# Patient Record
Sex: FEMALE | Race: OTHER | HISPANIC OR LATINO | Employment: UNEMPLOYED | URBAN - METROPOLITAN AREA
[De-identification: names, ages, dates, MRNs, and addresses within clinical notes are randomized per-mention and may not be internally consistent; named-entity substitution may affect disease eponyms.]

---

## 2017-01-31 ENCOUNTER — HOSPITAL ENCOUNTER (EMERGENCY)
Facility: HOSPITAL | Age: 62
Discharge: HOME | End: 2017-01-31
Attending: EMERGENCY MEDICINE
Payer: OTHER GOVERNMENT

## 2017-01-31 VITALS
RESPIRATION RATE: 20 BRPM | HEIGHT: 62 IN | HEART RATE: 69 BPM | SYSTOLIC BLOOD PRESSURE: 167 MMHG | TEMPERATURE: 99 F | BODY MASS INDEX: 38.64 KG/M2 | OXYGEN SATURATION: 100 % | WEIGHT: 210 LBS | DIASTOLIC BLOOD PRESSURE: 81 MMHG

## 2017-01-31 DIAGNOSIS — T30.0 BURN: Primary | ICD-10-CM

## 2017-01-31 DIAGNOSIS — T22.211A BURN OF FOREARM, RIGHT, SECOND DEGREE, INITIAL ENCOUNTER: ICD-10-CM

## 2017-01-31 PROCEDURE — 90471 IMMUNIZATION ADMIN: CPT | Performed by: EMERGENCY MEDICINE

## 2017-01-31 PROCEDURE — 99283 EMERGENCY DEPT VISIT LOW MDM: CPT

## 2017-01-31 PROCEDURE — 63600175 PHARM REV CODE 636 W HCPCS: Performed by: EMERGENCY MEDICINE

## 2017-01-31 PROCEDURE — 90715 TDAP VACCINE 7 YRS/> IM: CPT | Performed by: EMERGENCY MEDICINE

## 2017-01-31 PROCEDURE — 25000003 PHARM REV CODE 250: Performed by: EMERGENCY MEDICINE

## 2017-01-31 RX ORDER — SILVER SULFADIAZINE 10 G/1000G
1 CREAM TOPICAL
Status: COMPLETED | OUTPATIENT
Start: 2017-01-31 | End: 2017-01-31

## 2017-01-31 RX ORDER — SILVER SULFADIAZINE 10 G/1000G
CREAM TOPICAL 2 TIMES DAILY
Qty: 30 G | Refills: 0 | Status: SHIPPED | OUTPATIENT
Start: 2017-01-31 | End: 2017-08-20

## 2017-01-31 RX ORDER — BENAZEPRIL HYDROCHLORIDE 10 MG/1
10 TABLET ORAL DAILY
COMMUNITY

## 2017-01-31 RX ADMIN — SILVER SULFADIAZINE 1 TUBE: 10 CREAM TOPICAL at 12:01

## 2017-01-31 RX ADMIN — CLOSTRIDIUM TETANI TOXOID ANTIGEN (FORMALDEHYDE INACTIVATED), CORYNEBACTERIUM DIPHTHERIAE TOXOID ANTIGEN (FORMALDEHYDE INACTIVATED), BORDETELLA PERTUSSIS TOXOID ANTIGEN (GLUTARALDEHYDE INACTIVATED), BORDETELLA PERTUSSIS FILAMENTOUS HEMAGGLUTININ ANTIGEN (FORMALDEHYDE INACTIVATED), BORDETELLA PERTUSSIS PERTACTIN ANTIGEN, AND BORDETELLA PERTUSSIS FIMBRIAE 2/3 ANTIGEN 0.5 ML: 5; 2; 2.5; 5; 3; 5 INJECTION, SUSPENSION INTRAMUSCULAR at 12:01

## 2017-01-31 NOTE — ED AVS SNAPSHOT
[facility]  [address] [address]  [place] [address]               Yaneth Fabian   2017 12:04 PM   ED    Description:  Female : 1955   Department:  [facility]           Your Care was Coordinated By:     Provider Role From To    Chang Hurtado MD Attending Provider 17 1202 --      Reason for Visit     Burn           Diagnoses this Visit        Comments    Burn    -  Primary     Burn of forearm, right, second degree, initial encounter           ED Disposition     ED Disposition Condition Comment    Discharge             To Do List           Follow-up Information     Follow up with Vahe Hawkins MD In 2 days.    Specialty:  Internal Medicine    Contact information:    [address] [place] [address]  [address]  [facility]  [place] [address]  [phone]         These Medications        Disp Refills Start End    silver sulfADIAZINE 1% (SILVADENE) 1 % cream 30 g 0 2017     Apply topically 2 (two) times daily. - Topical (Top)    Pharmacy: [facility]/pharmacy #[id number] - [place], [place] - [address] [facility] Ph #: [phone]         [facility] On Call     [facility] Nurse Care Line -  Assistance  Registered nurses in the [facility] provide clinical advisement, health education, appointment booking, and other advisory services.  Call for this free service at [phone].             Medications           Message regarding Medications     Verify the changes and/or additions to your medication regime listed below are the same as discussed with your clinician today.  If any of these changes or additions are incorrect, please notify your healthcare provider.        START taking these NEW medications        Refills    silver sulfADIAZINE 1% (SILVADENE) 1 % cream 0    Sig: Apply topically 2 (two) times daily.    Class: Print    Route: Topical (Top)      These medications were administered today        Dose Freq    Tdap vaccine  "injection 0.5 mL 0.5 mL Once    Sig: Inject 0.5 mLs into the muscle once.    Class: Normal    Route: Intramuscular    silver sulfADIAZINE 1% cream 1 Tube 1 Tube ED 1 Time    Sig: Apply 1 Tube topically ED 1 Time.    Class: Normal    Route: Topical (Top)      STOP taking these medications     chlorpheniramine-hydrocodone (TUSSIONEX) 8-10 mg/5 mL suspension Take 5 mLs by mouth every 12 (twelve) hours as needed for Cough.    fluticasone (FLOVENT HFA) 110 mcg/actuation inhaler Inhale 1 puff into the lungs 2 (two) times daily.           Verify that the below list of medications is an accurate representation of the medications you are currently taking.  If none reported, the list may be blank. If incorrect, please contact your healthcare provider. Carry this list with you in case of emergency.           Current Medications     benazepril (LOTENSIN) 10 MG tablet Take 10 mg by mouth once daily.    furosemide (LASIX) 20 MG tablet Take 1 tablet (20 mg total) by mouth once daily.    hydrocodone-acetaminophen 10-325mg (NORCO)  mg Tab Take 1 tablet by mouth every 6 (six) hours as needed.    montelukast (SINGULAIR) 10 mg tablet Take 10 mg by mouth every evening.    nabumetone (RELAFEN) 500 MG tablet Take 500 mg by mouth once daily.    pantoprazole (PROTONIX) 40 MG tablet     potassium chloride (KLOR-CON) 10 MEQ TbSR Take 1 tablet (10 mEq total) by mouth once daily.    zolpidem (AMBIEN) 5 MG Tab Take 1 tablet (5 mg total) by mouth nightly as needed.    silver sulfADIAZINE 1% (SILVADENE) 1 % cream Apply topically 2 (two) times daily.    silver sulfADIAZINE 1% cream 1 Tube Apply 1 Tube topically ED 1 Time.    Tdap vaccine injection 0.5 mL Inject 0.5 mLs into the muscle once.           Clinical Reference Information           Your Vitals Were     BP Pulse Temp Resp Height Weight    196/83 73 98.5 °F (36.9 °C) (Oral) 20 5' 2" (1.575 m) 95.3 kg (210 lb)    SpO2 BMI             98% 38.41 kg/m2         Allergies as of 1/31/2017     " No Known Allergies      Immunizations Administered on Date of Encounter - 1/31/2017     Name Date Dose VIS Date Route    TDAP  Incomplete 0.5 mL 2/24/2015 Intramuscular      ED Micro, Lab, POCT     None      ED Imaging Orders     None        Discharge Instructions         Second-Degree Burn  A burn occurs when skin is exposed to too much heat, sun, or harsh chemicals. A second-degree burn (partial-thickness burn) is deeper than a first-degree burn (superficial burn). It usually causes a blister to form. The blister may remain intact and gradually go away on its own. Or it may break open. The goal of treatment is to relieve pain and stop infection while the burn heals.   Home care  Use pain medicine as directed. If no pain medicine was prescribed, you may use acetaminophen or ibuprofen to control pain. If you have chronic liver or kidney disease, talk with your health care provider before using these medicine. Also talk with your provider if you've had a stomach ulcer or GI bleeding.  General care  · On the first day, you may put a cool compress on the wound to ease pain. A cool compress is a small towel soaked in cool water.  · If you were sent home with the blister intact, don't break the blister. The risk for infection is greater if the blister breaks. If a bandage was applied, change it once a day, unless told otherwise. If the bandage becomes wet or soiled, change it as soon as you can.  · Sometimes an infection may occur even with proper treatment. Check the burn daily for the signs of infection listed below.  · Eat more calories and protein until your wound is healed.  · Wear a hat, sunscreen, and long sleeves while in the sun to protect the skin.  · Don't pick or scratch at the wound. Use over-the-counter medicines like diphenhydramine for itching.  · Avoid tight-fitting clothes.  To change a bandage:  · Wash your hands.  · Take off the old bandage. If the bandage sticks, soak it off under warm running  water.  · Once the bandage is off, gently wash the burn area with mild soap and warm water to remove any cream, ointment, ooze, or scab. You may do this in a sink, under a tub faucet, or in the shower. Rinse off the soap and gently pat dry with a clean towel.  · Check for signs of infection listed below.  · Put any prescribed antibiotic cream or ointment on the wound.  · Cover the burn with nonstick gauze. Then wrap it with the bandage material.  Follow-up care  Follow up with your health care provider, or as advised.  When to seek medical advice  Call your health care provider right away if you have any of these signs of infection:  · Fever over 100.4°F (38°C)  · Pain that gets worse  · Redness or swelling that gets worse  · Pus comes from the burn  · Red streaks in your skin coming from the burn  · Wound doesn't appear to be healing  · Nausea or vomiting   © 3570-5684 [employer]. [address] [place] [address]. All rights reserved. This information is not intended as a substitute for professional medical care. Always follow your healthcare professional's instructions.          Your Scheduled Appointments     Feb 14, 2017 10:00 AM CST   Consult with Lea Sanchez MD   [facility] ([address] [address] [phone]              [facility] Sign-Up     Activating your [facility] account is as easy as 1-2-3!     1) Visit [email or web address], select Sign Up Now, enter this activation code and your date of birth, then select Next.  [email or web address]  Expires: 3/17/2017 12:14 PM      2) Create a username and password to use when you visit [facility] in the future and select a security question in case you lose your password and select Next.    3) Enter your e-mail address and click Sign Up!    Additional Information  If you have questions, please e-mail [email or web address] or call [phone] to talk to our [facility] staff. Remember, [facility] is NOT to be  used for urgent needs. For medical emergencies, dial 911.          [facility] complies with applicable Federal civil rights laws and does not discriminate on the basis of race, color, national origin, age, disability, or sex.        Language Assistance Services     ATTENTION: Language assistance services are available, free of charge. Please call [phone].      ATENCIÓN: Si habla español, tiene a serna disposición servicios gratuitos de asistencia lingüística. Llame al [phone].     CHÚ Ý: N?u b?n nói Ti?ng Vi?t, có các d?ch v? h? tr? ngôn ng? mi?n phí dành cho b?n. G?i s? [phone].

## 2017-01-31 NOTE — ED PROVIDER NOTES
Encounter Date: 1/31/2017       History     Chief Complaint   Patient presents with    Burn     Burn from hot grease to right inner wrist.      Review of patient's allergies indicates:  No Known Allergies  Patient is a 61 y.o. female presenting with the following complaint: burn. The history is provided by the patient.   Burn   The patient complains of a hot grease burn. The incident occurred several days ago. The incident occurred at home. She came to the ER via by private vehicle. Pertinent negatives include no chest pain, no numbness, no abdominal pain, no nausea, no vomiting, no headaches, no neck pain, no light-headedness, no loss of consciousness, no seizures, no weakness and no cough. There have been no prior injuries to these areas. Her tetanus status is out of date.     Past Medical History   Diagnosis Date    Asthma     Hyperlipidemia     Hypertension      No past medical history pertinent negatives.  Past Surgical History   Procedure Laterality Date    Appendectomy      Hernia repair      Bladder repair      Knee surgery       right    Tympanic membrane repair       Family History   Problem Relation Age of Onset    Heart disease Mother     Hypertension Mother     Diabetes Mother     No Known Problems Father      unknown who he was per pt     Social History   Substance Use Topics    Smoking status: Never Smoker    Smokeless tobacco: Not on file    Alcohol use No     Review of Systems   Constitutional: Negative for chills, diaphoresis and fever.   HENT: Negative.  Negative for congestion, drooling, rhinorrhea and sore throat.    Eyes: Negative.  Negative for discharge and itching.   Respiratory: Negative.  Negative for cough, shortness of breath and wheezing.    Cardiovascular: Negative for chest pain, palpitations and leg swelling.   Gastrointestinal: Negative for abdominal pain, constipation, diarrhea, nausea and vomiting.   Genitourinary: Negative for difficulty urinating and dysuria.    Musculoskeletal: Negative for arthralgias, back pain, gait problem, neck pain and neck stiffness.   Skin: Negative for color change and pallor.   Neurological: Negative for dizziness, seizures, loss of consciousness, weakness, light-headedness, numbness and headaches.   Psychiatric/Behavioral: Negative for confusion and decreased concentration.   All other systems reviewed and are negative.      Physical Exam   Initial Vitals   BP Pulse Resp Temp SpO2   01/31/17 1201 01/31/17 1201 01/31/17 1201 01/31/17 1201 01/31/17 1201   196/83 73 20 98.5 °F (36.9 °C) 98 %     Physical Exam    Constitutional: She appears well-developed and well-nourished. No distress.   HENT:   Head: Normocephalic and atraumatic.   Eyes: Conjunctivae are normal. Pupils are equal, round, and reactive to light.   Neck: Normal range of motion. Neck supple.   Cardiovascular: Normal rate, regular rhythm, normal heart sounds and intact distal pulses.   Pulmonary/Chest: Breath sounds normal.   Abdominal: Soft. Bowel sounds are normal. She exhibits no distension. There is no tenderness. There is no rebound.   Musculoskeletal: Normal range of motion. She exhibits no edema.   Neurological: She is alert and oriented to person, place, and time. She has normal strength.   Skin: Skin is warm and dry.        Psychiatric: She has a normal mood and affect.         ED Course   Procedures  Labs Reviewed - No data to display                            ED Course     Clinical Impression:       ICD-10-CM ICD-9-CM   1. Burn T30.0 949.0   2. Burn of forearm, right, second degree, initial encounter T22.211A 943.21         Disposition:   Disposition: Discharged  Condition: Stable       Chang Hurtado MD  01/31/17 1217

## 2017-01-31 NOTE — DISCHARGE INSTRUCTIONS
Second-Degree Burn  A burn occurs when skin is exposed to too much heat, sun, or harsh chemicals. A second-degree burn (partial-thickness burn) is deeper than a first-degree burn (superficial burn). It usually causes a blister to form. The blister may remain intact and gradually go away on its own. Or it may break open. The goal of treatment is to relieve pain and stop infection while the burn heals.   Home care  Use pain medicine as directed. If no pain medicine was prescribed, you may use acetaminophen or ibuprofen to control pain. If you have chronic liver or kidney disease, talk with your health care provider before using these medicine. Also talk with your provider if you've had a stomach ulcer or GI bleeding.  General care  · On the first day, you may put a cool compress on the wound to ease pain. A cool compress is a small towel soaked in cool water.  · If you were sent home with the blister intact, don't break the blister. The risk for infection is greater if the blister breaks. If a bandage was applied, change it once a day, unless told otherwise. If the bandage becomes wet or soiled, change it as soon as you can.  · Sometimes an infection may occur even with proper treatment. Check the burn daily for the signs of infection listed below.  · Eat more calories and protein until your wound is healed.  · Wear a hat, sunscreen, and long sleeves while in the sun to protect the skin.  · Don't pick or scratch at the wound. Use over-the-counter medicines like diphenhydramine for itching.  · Avoid tight-fitting clothes.  To change a bandage:  · Wash your hands.  · Take off the old bandage. If the bandage sticks, soak it off under warm running water.  · Once the bandage is off, gently wash the burn area with mild soap and warm water to remove any cream, ointment, ooze, or scab. You may do this in a sink, under a tub faucet, or in the shower. Rinse off the soap and gently pat dry with a clean towel.  · Check for  signs of infection listed below.  · Put any prescribed antibiotic cream or ointment on the wound.  · Cover the burn with nonstick gauze. Then wrap it with the bandage material.  Follow-up care  Follow up with your health care provider, or as advised.  When to seek medical advice  Call your health care provider right away if you have any of these signs of infection:  · Fever over 100.4°F (38°C)  · Pain that gets worse  · Redness or swelling that gets worse  · Pus comes from the burn  · Red streaks in your skin coming from the burn  · Wound doesn't appear to be healing  · Nausea or vomiting   © 4353-7810 [employer]. [address] [place] [address], [place] [address]. All rights reserved. This information is not intended as a substitute for professional medical care. Always follow your healthcare professional's instructions.

## 2017-02-20 ENCOUNTER — LAB VISIT (OUTPATIENT)
Dept: LAB | Facility: HOSPITAL | Age: 62
End: 2017-02-20
Attending: OBSTETRICS & GYNECOLOGY
Payer: OTHER GOVERNMENT

## 2017-02-20 ENCOUNTER — OFFICE VISIT (OUTPATIENT)
Dept: OBSTETRICS AND GYNECOLOGY | Facility: CLINIC | Age: 62
End: 2017-02-20
Payer: OTHER GOVERNMENT

## 2017-02-20 VITALS
DIASTOLIC BLOOD PRESSURE: 78 MMHG | BODY MASS INDEX: 38.21 KG/M2 | WEIGHT: 215.63 LBS | SYSTOLIC BLOOD PRESSURE: 142 MMHG | HEIGHT: 63 IN

## 2017-02-20 DIAGNOSIS — N95.0 POST-MENOPAUSAL BLEEDING: ICD-10-CM

## 2017-02-20 DIAGNOSIS — E66.9 OBESITY (BMI 35.0-39.9 WITHOUT COMORBIDITY): ICD-10-CM

## 2017-02-20 LAB
BASOPHILS # BLD AUTO: 0.03 K/UL
BASOPHILS NFR BLD: 0.4 %
DIFFERENTIAL METHOD: ABNORMAL
EOSINOPHIL # BLD AUTO: 0.2 K/UL
EOSINOPHIL NFR BLD: 2.4 %
ERYTHROCYTE [DISTWIDTH] IN BLOOD BY AUTOMATED COUNT: 12.7 %
HCT VFR BLD AUTO: 42.9 %
HGB BLD-MCNC: 14.5 G/DL
LYMPHOCYTES # BLD AUTO: 2.4 K/UL
LYMPHOCYTES NFR BLD: 31.5 %
MCH RBC QN AUTO: 31.5 PG
MCHC RBC AUTO-ENTMCNC: 33.8 %
MCV RBC AUTO: 93 FL
MONOCYTES # BLD AUTO: 0.6 K/UL
MONOCYTES NFR BLD: 7.5 %
NEUTROPHILS # BLD AUTO: 4.4 K/UL
NEUTROPHILS NFR BLD: 58.1 %
PLATELET # BLD AUTO: 191 K/UL
PMV BLD AUTO: 12 FL
RBC # BLD AUTO: 4.6 M/UL
WBC # BLD AUTO: 7.58 K/UL

## 2017-02-20 PROCEDURE — 85025 COMPLETE CBC W/AUTO DIFF WBC: CPT

## 2017-02-20 PROCEDURE — 88305 TISSUE EXAM BY PATHOLOGIST: CPT | Mod: 26,,, | Performed by: PATHOLOGY

## 2017-02-20 PROCEDURE — 58100 BIOPSY OF UTERUS LINING: CPT | Mod: S$GLB,,, | Performed by: OBSTETRICS & GYNECOLOGY

## 2017-02-20 PROCEDURE — 3077F SYST BP >= 140 MM HG: CPT | Mod: S$GLB,,, | Performed by: OBSTETRICS & GYNECOLOGY

## 2017-02-20 PROCEDURE — 99203 OFFICE O/P NEW LOW 30 MIN: CPT | Mod: 25,S$GLB,, | Performed by: OBSTETRICS & GYNECOLOGY

## 2017-02-20 PROCEDURE — 88305 TISSUE EXAM BY PATHOLOGIST: CPT | Performed by: PATHOLOGY

## 2017-02-20 PROCEDURE — 36415 COLL VENOUS BLD VENIPUNCTURE: CPT | Mod: PO

## 2017-02-20 PROCEDURE — 88175 CYTOPATH C/V AUTO FLUID REDO: CPT

## 2017-02-20 PROCEDURE — 99999 PR PBB SHADOW E&M-EST. PATIENT-LVL III: CPT | Mod: PBBFAC,,, | Performed by: OBSTETRICS & GYNECOLOGY

## 2017-02-20 PROCEDURE — 3078F DIAST BP <80 MM HG: CPT | Mod: S$GLB,,, | Performed by: OBSTETRICS & GYNECOLOGY

## 2017-02-20 RX ORDER — MELOXICAM 15 MG/1
TABLET ORAL
Refills: 3 | COMMUNITY
Start: 2016-11-23 | End: 2023-12-15 | Stop reason: CLARIF

## 2017-02-20 NOTE — LETTER
February 20, 2017      Vahe Hawkins MD  [address] [facility]  [address]  [facility]  [place] [address]           [facility]  [address] [address] [address]  Phone: [phone]          Patient: Yaneth Fabian   MR Number: [id number]   YOB: 1955   Date of Visit: 2/20/2017       Dear Dr. Vahe Hawkins:    Thank you for referring Yaneth Fabian to me for evaluation. Attached you will find relevant portions of my assessment and plan of care.    If you have questions, please do not hesitate to call me. I look forward to following Yaneth Fabian along with you.    Sincerely,    Hugh Buenrostro MD    Enclosure  CC:  No Recipients    If you would like to receive this communication electronically, please contact [email or web address] or [phone] to request more information on [employer] Link access.    For providers and/or their staff who would like to refer a patient to [facility], please contact us through our one-stop-shop provider referral line, [facility], at [phone].    If you feel you have received this communication in error or would no longer like to receive these types of communications, please e-mail [email or web address]

## 2017-02-20 NOTE — MR AVS SNAPSHOT
[facility]  [address] [address] [address]  Phone: [phone]                  Yaneth Fabian   2017 10:45 AM   Office Visit    Description:  Female : 1955   Provider:  Hugh Buenrostro MD   Department:  [facility]           Reason for Visit     Vaginal Bleeding           Diagnoses this Visit        Comments    Post-menopausal bleeding         Obesity (BMI 35.0-39.9 without comorbidity)                To Do List           Future Appointments        Provider Department Dept Phone    2017 12:15 PM [facility] LABORATORY [facility] [phone]    3/20/2017 10:30 AM Hugh Buenrostro MD [facility]--[phone]      Goals (5 Years of Data)     None      [facility]     [facility] Nurse Care Line -  Assistance  Registered nurses in the [facility] provide clinical advisement, health education, appointment booking, and other advisory services.  Call for this free service at [phone].             Medications           Message regarding Medications     Verify the changes and/or additions to your medication regime listed below are the same as discussed with your clinician today.  If any of these changes or additions are incorrect, please notify your healthcare provider.             Verify that the below list of medications is an accurate representation of the medications you are currently taking.  If none reported, the list may be blank. If incorrect, please contact your healthcare provider. Carry this list with you in case of emergency.           Current Medications     ALBUTEROL INHL Inhale into the lungs.    benazepril (LOTENSIN) 10 MG tablet Take 10 mg by mouth once daily.    furosemide (LASIX) 20 MG tablet Take 1 tablet (20 mg total) by mouth once daily.    hydrocodone-acetaminophen 10-325mg (NORCO)  mg Tab Take 1 tablet by mouth every 6 (six) hours as needed.    meloxicam (MOBIC) 15 MG tablet TAKE 1 TABLET DAILY  "BY MOUTH WITH FOOD    montelukast (SINGULAIR) 10 mg tablet Take 10 mg by mouth every evening.    nabumetone (RELAFEN) 500 MG tablet Take 500 mg by mouth once daily.    pantoprazole (PROTONIX) 40 MG tablet     potassium chloride (KLOR-CON) 10 MEQ TbSR Take 1 tablet (10 mEq total) by mouth once daily.    silver sulfADIAZINE 1% (SILVADENE) 1 % cream Apply topically 2 (two) times daily.    zolpidem (AMBIEN) 5 MG Tab Take 1 tablet (5 mg total) by mouth nightly as needed.           Clinical Reference Information           Your Vitals Were     BP Height Weight Last Period BMI    142/78 5' 2.5" (1.588 m) 97.8 kg (215 lb 9.8 oz) 02/04/2017 (Exact Date) 38.81 kg/m2      Blood Pressure          Most Recent Value    BP  (!)  142/78      Allergies as of 2/20/2017     No Known Allergies      Immunizations Administered on Date of Encounter - 2/20/2017     None      Orders Placed During Today's Visit      Normal Orders This Visit    Endometrial biopsy     Liquid-based pap smear, screening     Tissue Specimen To Pathology, Obstetrics/Gynecology     Future Labs/Procedures Expected by Expires    CBC auto differential  2/20/2017 4/21/2018    Endometrial biopsy  As directed 2/20/2018      Language Assistance Services     ATTENTION: Language assistance services are available, free of charge. Please call [phone].      ATENCIÓN: Si gail steiner, tiene a serna disposición servicios gratuitos de asistencia lingüística. Llame al [phone].     CHÚ Ý: N?u b?n nói Ti?ng Vi?t, có các d?ch v? h? tr? ngôn ng? mi?n phí dành cho b?n. G?i s? [phone].         [facility] complies with applicable Federal civil rights laws and does not discriminate on the basis of race, color, national origin, age, disability, or sex.        "

## 2017-02-20 NOTE — PROGRESS NOTES
"Yaneth Fabian is a 61 y.o.  who presents for   Chief Complaint   Patient presents with    Vaginal Bleeding     c/o  bleeding with cramps x 4 months, "just like a regular period."     Patient's last menstrual period was 2017 (exact date).     Went thru menopause at [age]    Pt is sexually active - mut monog - uses BTL for contraception.    No hx of abnormal paps. Last pap was normal on 13.  Last mammogram was normal on 13.  Last colonoscopy was done 13.      Past Medical History   Diagnosis Date    Asthma     GERD (gastroesophageal reflux disease)     Hyperlipidemia     Hypertension     Knee pain        Past Surgical History   Procedure Laterality Date    Appendectomy      Hernia repair      Bladder repair      Knee surgery       right    Tympanic membrane repair      Tubal ligation         Current Outpatient Prescriptions   Medication Sig Dispense Refill    ALBUTEROL INHL Inhale into the lungs.      benazepril (LOTENSIN) 10 MG tablet Take 10 mg by mouth once daily.      furosemide (LASIX) 20 MG tablet Take 1 tablet (20 mg total) by mouth once daily. 30 tablet 3    hydrocodone-acetaminophen 10-325mg (NORCO)  mg Tab Take 1 tablet by mouth every 6 (six) hours as needed. 45 tablet 0    meloxicam (MOBIC) 15 MG tablet TAKE 1 TABLET DAILY BY MOUTH WITH FOOD  3    montelukast (SINGULAIR) 10 mg tablet Take 10 mg by mouth every evening.  6    nabumetone (RELAFEN) 500 MG tablet Take 500 mg by mouth once daily.      pantoprazole (PROTONIX) 40 MG tablet   3    potassium chloride (KLOR-CON) 10 MEQ TbSR Take 1 tablet (10 mEq total) by mouth once daily. 30 tablet 3    silver sulfADIAZINE 1% (SILVADENE) 1 % cream Apply topically 2 (two) times daily. 30 g 0    zolpidem (AMBIEN) 5 MG Tab Take 1 tablet (5 mg total) by mouth nightly as needed. 30 tablet 2     No current facility-administered medications for this visit.           Review of patient's allergies indicates:  No " "Known Allergies    .  Family History   Problem Relation Age of Onset    Heart disease Mother     Hypertension Mother     Diabetes Mother     No Known Problems Father      unknown who he was per pt    Breast cancer Neg Hx     Colon cancer Neg Hx     Ovarian cancer Neg Hx     Thrombosis Neg Hx        Social History   Substance Use Topics    Smoking status: Never Smoker    Smokeless tobacco: None    Alcohol use No       Visit Vitals    BP (!) 142/78    Ht 5' 2.5" (1.588 m)    Wt 97.8 kg (215 lb 9.8 oz)    LMP 02/04/2017 (Exact Date)    BMI 38.81 kg/m2       ROS:  GENERAL: No other acute complaints.   GI: No nausea, vomiting, melena, hematochezia.  : No dysuria, hematuria.      GEN:  Alert and oriented lady in no acute distress.  HEAD and NECK: Normocephalic. Normal thyroid to inspection and palpation  SKIN: No significant hirsutism or acne                ABDOMEN: Overweight, soft and non tender. No mass, hepatomegaly, RUQT or CVAT.   PELVIC:      Vulva: normal genitalia. No suspicious lesions.       Urethra: normal       Vagina: Moist, no unusual discharge, no significant cystocele or rectocele      Cervix: Normal appearance. Free of discharge or lesion. Very scant brown discharge - Pap done        Uterus: Very difficult exam 2ary to obesity, but feels 8+ wks size, non tender. No cervical motion tenderness.           Bladder base is non tender.      Adnexa: No masses, tenderness, or CDS nodularity.      Anus: normal appearing.    After obtaining verbal consent (discussed bleeding, discomfort, and remotely infection or uterine perforation),  the cervix was visualized and prepped with betadine. The uterus was then sounded to 8 cm and an endometrial biopsy was obtained using a Pipelle without complication and the specimen sent to pathology.    POST ENDOMETRIAL BIOPSY COUNSELING:  Manage post biopsy cramping with NSAIDs or Tyleno.  Expect spotting or light bleeding for a few days.  Report bleeding heavier " than a period, fever > 101.0 F, worsening pain or a foul smelling vaginal discharge.        IMPRESSION: obesity, post menopausal bleeding      COUNSELING:  - Discussed pt's weight and the health implications (increased risks of thrombosis, DM, HTN, renal disease, lipid issues) -  recommend Weight Watchers, OLOL, or any other organized program) along with increased activity/exercise (30-60 min 5 times/wk). Suggest a target of 5-10 % wt reduction over 6-12 months as a goal.    PLAN: pelvic u/s, CBC, RTC 2 WKS

## 2017-08-20 ENCOUNTER — HOSPITAL ENCOUNTER (EMERGENCY)
Facility: HOSPITAL | Age: 62
Discharge: HOME | End: 2017-08-20
Payer: OTHER GOVERNMENT

## 2017-08-20 VITALS
DIASTOLIC BLOOD PRESSURE: 74 MMHG | OXYGEN SATURATION: 96 % | TEMPERATURE: 98 F | HEART RATE: 75 BPM | HEIGHT: 62 IN | SYSTOLIC BLOOD PRESSURE: 175 MMHG | RESPIRATION RATE: 18 BRPM | WEIGHT: 201.63 LBS | BODY MASS INDEX: 37.1 KG/M2

## 2017-08-20 DIAGNOSIS — S90.819A ABRASION FOOT/TOE: Primary | ICD-10-CM

## 2017-08-20 DIAGNOSIS — Z92.29 HISTORY OF TETANUS, DIPHTHERIA, AND ACELLULAR PERTUSSIS BOOSTER VACCINATION (TDAP): ICD-10-CM

## 2017-08-20 DIAGNOSIS — T14.8XXA SCRATCH MARK: ICD-10-CM

## 2017-08-20 PROCEDURE — 99283 EMERGENCY DEPT VISIT LOW MDM: CPT

## 2017-08-20 RX ORDER — MUPIROCIN 20 MG/G
OINTMENT TOPICAL 3 TIMES DAILY
Qty: 30 G | Refills: 0 | Status: SHIPPED | OUTPATIENT
Start: 2017-08-20 | End: 2017-08-30

## 2017-08-21 NOTE — ED NOTES
CARISSA Caballero aware of pt's vital signs & states OK for discharge at this time. Pt remains stable, in NAD, RR equal & unlabored. Reports she will take her meds when she leaves here. Pt denies any further needs, questions, concerns or complaints. Will d/c per order.

## 2017-08-21 NOTE — ED PROVIDER NOTES
"  History      Chief Complaint   Patient presents with    Animal Bite     Pt reports she was in her laundry room ~15-20 minutes PTA & a rat "attacked" her. Superficial scratch noted to L posterior ankle. No bleeding. No other obvious injuries.        Review of patient's allergies indicates:  No Known Allergies     HPI   HPI    8/20/2017, 8:03 PM   History obtained from the patient      History of Present Illness: Yaneth Fabian is a 61 y.o. female patient who presents to the Emergency Department for scratch from rat PTA.  She reports she was folding clothes in her shed when a rat passed by her foot and scratched her.  Denies fever, vomiting, diarrhea.  Patient states she had Tetanus shot in January 2017.      Arrival mode: Personal vehicle     PCP: Vahe Hawkins MD       Past Medical History:  Past Medical History:   Diagnosis Date    Arthritis     Asthma     GERD (gastroesophageal reflux disease)     Hyperlipidemia     Hypertension     Knee pain        Past Surgical History:  Past Surgical History:   Procedure Laterality Date    APPENDECTOMY      BLADDER REPAIR      HERNIA REPAIR      KNEE SURGERY      right    TUBAL LIGATION      TYMPANIC MEMBRANE REPAIR           Family History:  Family History   Problem Relation Age of Onset    Heart disease Mother     Hypertension Mother     Diabetes Mother     No Known Problems Father      unknown who he was per pt    Breast cancer Neg Hx     Colon cancer Neg Hx     Ovarian cancer Neg Hx     Thrombosis Neg Hx        Social History:  Social History     Social History Main Topics    Smoking status: Never Smoker    Smokeless tobacco: Never Used    Alcohol use No    Drug use: No    Sexual activity: Yes     Partners: Male      Comment: mut monog       ROS   Review of Systems   Constitutional: Negative for chills and fever.   HENT: Negative for congestion and sore throat.    Respiratory: Negative for cough and wheezing.    Gastrointestinal: Negative " "for diarrhea and vomiting.   Skin: Positive for wound.     Physical Exam      Initial Vitals [08/20/17 1948]   BP Pulse Resp Temp SpO2   (!) 175/74 75 18 98.1 °F (36.7 °C) 96 %      MAP       107.67          Physical Exam  Nursing Notes and Vital Signs Reviewed.  Constitutional: Patient is in no apparent distress. Awake and alert. Well-developed and well-nourished.  Head: Atraumatic. Normocephalic.  Eyes: PERRL. EOM intact. Conjunctivae are not pale. No scleral icterus.  ENT: Mucous membranes are moist. Oropharynx is clear and symmetric.    Neck: Supple. Full ROM. No lymphadenopathy.  Cardiovascular: Regular rate. Regular rhythm. No murmurs, rubs, or gallops. Distal pulses are 2+ and symmetric.  Pulmonary/Chest: No respiratory distress. Clear to auscultation bilaterally. No wheezing, rales, or rhonchi.  Abdominal: Soft and non-distended.  There is no tenderness.  No rebound, guarding, or rigidity. Good bowel sounds.  Genitourinary: No CVA tenderness  Musculoskeletal: Moves all extremities. No obvious deformities. No edema. No calf tenderness.  Skin: Warm and dry.  Less than 1 cm superficial, abrasion of left heel, no bleeding.    Neurological:  Alert, awake, and appropriate.  Normal speech.  No acute focal neurological deficits are appreciated.  Psychiatric: Normal affect. Good eye contact. Appropriate in content.    ED Course    Procedures  ED Vital Signs:  Vitals:    08/20/17 1948   BP: (!) 175/74   Pulse: 75   Resp: 18   Temp: 98.1 °F (36.7 °C)   TempSrc: Oral   SpO2: 96%   Weight: 91.4 kg (201 lb 9.6 oz)   Height: 5' 2" (1.575 m)       Abnormal Lab Results:  Labs Reviewed - No data to display         Imaging Results:  Imaging Results    None                 The Emergency Provider reviewed the vital signs and test results, which are outlined above.    ED Discussion     8:10 PM: Discussed with pt all pertinent ED information and results. Discussed pt dx and plan of tx. Gave pt all f/u and return to the ED " instructions. All questions and concerns were addressed at this time. Pt expresses understanding of information and instructions, and is comfortable with plan to discharge. Pt is stable for discharge.    I discussed with patient and/or family/caretaker that evaluation in the ED does not suggest any emergent or life threatening medical conditions requiring immediate intervention beyond what was provided in the ED, and I believe patient is safe for discharge.  Regardless, an unremarkable evaluation in the ED does not preclude the development or presence of a serious of life threatening condition. As such, patient was instructed to return immediately for any worsening or change in current symptoms.    Pre-hypertension/Hypertension: The pt has been informed that they may have pre-hypertension or hypertension based on a blood pressure reading in the ED. I recommend that the pt call the PCP listed on their discharge instructions or a physician of their choice this week to arrange f/u for further evaluation of possible pre-hypertension or hypertension.       ED Medication(s):  Medications - No data to display    Discharge Medication List as of 8/20/2017  8:13 PM      START taking these medications    Details   mupirocin (BACTROBAN) 2 % ointment Apply topically 3 (three) times daily., Starting Sun 8/20/2017, Until Wed 8/30/2017, Print             Follow-up Information     Vahe Hawkins MD In 3 days.    Specialty:  Internal Medicine  Contact information:  [address] [place] [id number] [phone]                     Medical Decision Making                  Clinical Impression       ICD-10-CM ICD-9-CM   1. Abrasion foot/toe S90.819A 917.0   2. Scratch sascha T14.8 919.0   3. History of tetanus, diphtheria, and acellular pertussis booster vaccination (Tdap) Z92.29 V45.89       Disposition:   Disposition: Discharged  Condition: Stable           Ada Pena PA-C  08/20/17 2600

## 2017-10-23 ENCOUNTER — HOSPITAL ENCOUNTER (EMERGENCY)
Facility: HOSPITAL | Age: 62
Discharge: HOME | End: 2017-10-23
Attending: EMERGENCY MEDICINE
Payer: OTHER GOVERNMENT

## 2017-10-23 VITALS
BODY MASS INDEX: 36.62 KG/M2 | SYSTOLIC BLOOD PRESSURE: 176 MMHG | HEIGHT: 62 IN | HEART RATE: 74 BPM | TEMPERATURE: 99 F | RESPIRATION RATE: 18 BRPM | DIASTOLIC BLOOD PRESSURE: 82 MMHG | WEIGHT: 199 LBS | OXYGEN SATURATION: 99 %

## 2017-10-23 DIAGNOSIS — B96.89 ACUTE BACTERIAL RHINOSINUSITIS: Primary | ICD-10-CM

## 2017-10-23 DIAGNOSIS — I10 ESSENTIAL HYPERTENSION: ICD-10-CM

## 2017-10-23 DIAGNOSIS — J01.90 ACUTE BACTERIAL RHINOSINUSITIS: Primary | ICD-10-CM

## 2017-10-23 DIAGNOSIS — J00 ACUTE NASOPHARYNGITIS: ICD-10-CM

## 2017-10-23 PROCEDURE — 63600175 PHARM REV CODE 636 W HCPCS: Performed by: NURSE PRACTITIONER

## 2017-10-23 PROCEDURE — 25000003 PHARM REV CODE 250: Performed by: NURSE PRACTITIONER

## 2017-10-23 PROCEDURE — 96372 THER/PROPH/DIAG INJ SC/IM: CPT

## 2017-10-23 PROCEDURE — 99283 EMERGENCY DEPT VISIT LOW MDM: CPT | Mod: 25

## 2017-10-23 RX ORDER — PREDNISONE 20 MG/1
TABLET ORAL
Qty: 18 TABLET | Refills: 0 | Status: SHIPPED | OUTPATIENT
Start: 2017-10-23 | End: 2017-10-23

## 2017-10-23 RX ORDER — AMOXICILLIN AND CLAVULANATE POTASSIUM 875; 125 MG/1; MG/1
1 TABLET, FILM COATED ORAL 2 TIMES DAILY
Qty: 20 TABLET | Refills: 0 | Status: SHIPPED | OUTPATIENT
Start: 2017-10-23 | End: 2017-11-02

## 2017-10-23 RX ORDER — PROMETHAZINE HYDROCHLORIDE AND CODEINE PHOSPHATE 6.25; 1 MG/5ML; MG/5ML
5 SOLUTION ORAL EVERY 4 HOURS PRN
Qty: 118 ML | Refills: 0 | Status: SHIPPED | OUTPATIENT
Start: 2017-10-23 | End: 2017-11-02

## 2017-10-23 RX ORDER — PREDNISONE 20 MG/1
TABLET ORAL
Qty: 18 TABLET | Refills: 0 | Status: SHIPPED | OUTPATIENT
Start: 2017-10-23 | End: 2017-12-09

## 2017-10-23 RX ORDER — AMOXICILLIN AND CLAVULANATE POTASSIUM 875; 125 MG/1; MG/1
1 TABLET, FILM COATED ORAL 2 TIMES DAILY
Qty: 20 TABLET | Refills: 0 | Status: SHIPPED | OUTPATIENT
Start: 2017-10-23 | End: 2017-10-23

## 2017-10-23 RX ORDER — AMOXICILLIN AND CLAVULANATE POTASSIUM 875; 125 MG/1; MG/1
1 TABLET, FILM COATED ORAL
Status: COMPLETED | OUTPATIENT
Start: 2017-10-23 | End: 2017-10-23

## 2017-10-23 RX ORDER — BETAMETHASONE SODIUM PHOSPHATE AND BETAMETHASONE ACETATE 3; 3 MG/ML; MG/ML
6 INJECTION, SUSPENSION INTRA-ARTICULAR; INTRALESIONAL; INTRAMUSCULAR; SOFT TISSUE
Status: COMPLETED | OUTPATIENT
Start: 2017-10-23 | End: 2017-10-23

## 2017-10-23 RX ADMIN — AMOXICILLIN AND CLAVULANATE POTASSIUM 1 TABLET: 875; 125 TABLET, FILM COATED ORAL at 09:10

## 2017-10-23 RX ADMIN — BETAMETHASONE SODIUM PHOSPHATE AND BETAMETHASONE ACETATE 6 MG: 3; 3 INJECTION, SUSPENSION INTRA-ARTICULAR; INTRALESIONAL; INTRAMUSCULAR at 09:10

## 2017-10-24 NOTE — DISCHARGE INSTRUCTIONS
Do not begin the oral steroids until Wednesday since you received a steroid injection today. You may begin the antibiotics tomorrow.     Do not drive or operate heavy machinery after taking cough medication.     Complete full course of antibiotics.

## 2017-10-24 NOTE — ED PROVIDER NOTES
"Encounter Date: 10/23/2017       History     Chief Complaint   Patient presents with    URI     Pt states, "I have this real bad cold." Onset 1 week ago. Reports taking OTC cough medicine for past week without relief. Reports cough and runny nose.      The history is provided by the patient.   Sinusitis    This is a new problem. The current episode started several days ago. The problem has been gradually worsening. The pain is at a severity of 8/10. The pain has been worsening since onset. Associated symptoms include chills, sweats, congestion, sinus pressure, sore throat and cough. Pertinent negatives include no shortness of breath. She has tried acetaminophen and other medications for the symptoms. The treatment provided no relief.       PCP:   Vahe Hawkins MD        Review of patient's allergies indicates:  No Known Allergies  Past Medical History:   Diagnosis Date    Arthritis     Asthma     GERD (gastroesophageal reflux disease)     Hyperlipidemia     Hypertension     Knee pain      Past Surgical History:   Procedure Laterality Date    APPENDECTOMY      BLADDER REPAIR      HERNIA REPAIR      KNEE SURGERY      right    TUBAL LIGATION      TYMPANIC MEMBRANE REPAIR       Family History   Problem Relation Age of Onset    Heart disease Mother     Hypertension Mother     Diabetes Mother     No Known Problems Father      unknown who he was per pt    Breast cancer Neg Hx     Colon cancer Neg Hx     Ovarian cancer Neg Hx     Thrombosis Neg Hx      Social History   Substance Use Topics    Smoking status: Never Smoker    Smokeless tobacco: Never Used    Alcohol use No     Review of Systems   Constitutional: Positive for chills. Negative for fever.   HENT: Positive for congestion, postnasal drip, rhinorrhea, sinus pain, sinus pressure and sore throat.    Eyes: Negative for visual disturbance.   Respiratory: Positive for cough and chest tightness. Negative for shortness of breath.  "   Cardiovascular: Negative for chest pain and palpitations.   Gastrointestinal: Negative for abdominal pain and nausea.   Genitourinary: Negative for dysuria.   Musculoskeletal: Negative for back pain, neck pain and neck stiffness.   Skin: Negative for rash.   Neurological: Positive for headaches (frontal). Negative for dizziness and weakness.   Hematological: Does not bruise/bleed easily.       Physical Exam     Initial Vitals [10/23/17 2043]   BP Pulse Resp Temp SpO2   (!) 181/84 91 20 98.5 °F (36.9 °C) 100 %      MAP       116.33         Physical Exam    Nursing note and vitals reviewed.  Constitutional: She appears well-developed and well-nourished. She is Obese . She is cooperative. She appears ill (appears tired and ill). No distress.   HENT:   Head: Normocephalic and atraumatic.   Right Ear: Hearing and external ear normal. A middle ear effusion (clear) is present.   Left Ear: Hearing and external ear normal. There is tenderness. A middle ear effusion (clear) is present.   Nose: Mucosal edema and sinus tenderness present.   Mouth/Throat: Uvula is midline and mucous membranes are normal. Oropharyngeal exudate and posterior oropharyngeal erythema present.   Ceruminosis present bilaterally.   Eyes: Conjunctivae, EOM and lids are normal. Pupils are equal, round, and reactive to light.   Neck: Trachea normal and normal range of motion. Neck supple.   Cardiovascular: Normal rate, regular rhythm, intact distal pulses and normal pulses.   Pulmonary/Chest: Effort normal and breath sounds normal. No respiratory distress. She has no wheezes. She has no rhonchi. She has no rales.   Abdominal: Soft. She exhibits no distension and no mass. There is no tenderness. There is no rebound and no guarding.   Musculoskeletal: Normal range of motion. She exhibits no edema or tenderness.   Lymphadenopathy:     She has cervical adenopathy.        Right cervical: Superficial cervical adenopathy present.        Left cervical:  "Superficial cervical adenopathy present.   Neurological: She is alert and oriented to person, place, and time. She has normal strength. GCS eye subscore is 4. GCS verbal subscore is 5. GCS motor subscore is 6.   Neurovascular intact to all extremities. Normal gait.    Skin: Skin is warm, dry and intact. Capillary refill takes less than 2 seconds. No rash noted.   Psychiatric: She has a normal mood and affect. Her speech is normal and behavior is normal. Thought content normal.         ED Course   Procedures      ED Medications:   Medications   amoxicillin-clavulanate 875-125mg per tablet 1 tablet (1 tablet Oral Given 10/23/17 2123)   betamethasone acetate-betamethasone sodium phosphate injection 6 mg (6 mg Intramuscular Given 10/23/17 2122)       ED Course Vitals  Vitals:    10/23/17 2043 10/23/17 2137   BP: (!) 181/84 (!) 176/82   BP Location: Left arm Right arm   Patient Position: Sitting Sitting   Pulse: 91 74   Resp: 20 18   Temp: 98.5 °F (36.9 °C)    TempSrc: Oral    SpO2: 100% 99%   Weight: 90.3 kg (199 lb)    Height: 5' 2" (1.575 m)          2135 HOURS RE-EVALUATION & DISPOSITION:   Reassessment at the time of disposition demonstrates that the patient is resting comfortably in no acute distress. Ms. Fabian states that she is feeling much better upon discharge.  She has remained hemodynamically stable throughout the entire ED visit and is without objective evidence for acute process requiring urgent intervention or hospitalization. I provided counseling to patient with regard to condition, the treatment plan, specific conditions for return, and the importance of follow up.  Answered questions at this time. The patient is stable for discharge.                 Medical Decision Making:   History:   Old Records Summarized: records from clinic visits.                     Clinical Impression:       ICD-10-CM ICD-9-CM   1. Acute bacterial rhinosinusitis J01.90 461.9    B96.89    2. Acute nasopharyngitis J00 460 "   3. Essential hypertension I10 401.9         Disposition:   Disposition: Discharged  Condition: Stable  I discussed with patient that the evaluation in the emergency department does not suggest any emergent or life threatening medical condition requiring immediate intervention beyond what was provided in the ED, and I believe patient is safe for discharge.  Regardless, an unremarkable evaluation in the ED does not preclude the development or presence of a serious of life threatening condition. As such, patient was instructed to return immediately for any worsening or change in current symptoms. I also discussed the results of my evaluation and diagnosis with patient and she concurs with the evaluation and management plan.  Detailed written and verbal instructions provided to patient and she expressed a verbal understanding of the discharge instructions and overall management plan. Reiterated the importance of medication administration and safety and advised patient to follow up with primary care provider in 3-5 days or sooner if needed.  Also advised patient to return to the ER for any complications.     Regarding SINUSITIS, for treatment, encouraged patient to refrain from smoking, drink plenty of fluids, rest, take medications as prescribed, and to use a humidifier or steam in the bathroom. Patient instructed to notify primary care provider if: they have a cough most days or have a cough that returns frequently; are coughing up blood; have a high fever or shaking chills; have a low-grade fever for three or more days; develop thick, greenish mucus, especially if it has a bad smell; and feel short of breath or have chest pain, or return to emergency department for further evaluation. Also recommended that the patient shower in the morning and evening to wash away any allergens and help reduce the production of mucus, avoid taking decongestants if diagnosed with hypertension.  If decongestants were recommended,  advised patient to not take for longer than 5 days to help prevent rebound congestion. For prevention, discussed with patient the importance of not smoking, getting annual influenza vaccines, reducing exposure to air pollution, and to frequently wash hands to avoid spread of infection. Instructed patient to follow up with primary care provider.    Regarding UPPER RESPIRATORY ILLNESS, for treatment, I encouraged patient to: drink plenty of fluids; get lots of rest; take medications as prescribed; use over-the-counter medications for symptomatic treatment;  and use a humidifier or steam in the bathroom to improve patency of upper airway.  Patient instructed to notify primary care provider, or return to the emergency department, if the they: have a cough most days or have a cough that returns frequently; begin coughing up blood; develop a high fever or shaking chills; have a low-grade fever for three or more days; develop thick, greenish mucus, especially if it has a bad smell; and experience shortness of breath or chest pain. For prevention, discussed with patient the importance of refraining from smoking (if applicable), getting annual influenza vaccines, reducing exposure to air pollution, and the need to frequently wash hands to avoid spread of infection.     Regarding HYPERTENSION, I advised patient to: keep a record of blood pressure results; take your blood pressure medication exactly as directed without skipping doses; avoid medications that contain heart stimulants, including over-the-counter drugs such as decongestants; maintain a healthy weight; cut back on sodium intake (i.e., limit canned, dried, packaged, and fast foods and dont add salt to food); follow the DASH (Dietary Approaches to Stop Hypertension) eating plan which recommends vegetables, fruits, whole gains, and other heart healthy foods; begin an exercise program that includes  aerobic exercise 3 to 4 times a week for an average of 40 minutes at  a time (with approval of cardiologist or primary care provider); limit drinks that contain alcohol and caffeine; control levels of emotional stress; and seek emergency care for any shortness of breath, chest pain, difficulty speaking, confusion, or visual changes.               Discharge Medication List as of 10/23/2017  9:23 PM      START taking these medications    Details   promethazine-codeine 6.25-10 mg/5 ml (PHENERGAN WITH CODEINE) 6.25-10 mg/5 mL syrup Take 5 mLs by mouth every 4 (four) hours as needed., Starting Mon 10/23/2017, Until Thu 11/2/2017, Print      amoxicillin-clavulanate 875-125mg (AUGMENTIN) 875-125 mg per tablet Take 1 tablet by mouth 2 (two) times daily., Starting Mon 10/23/2017, Until Thu 11/2/2017, Normal      predniSONE (DELTASONE) 20 MG tablet Take 1 tablet 3 times per day for 3 days, then Take 1 tablet 2 times per day for 3 days, then Take 1 tablet daily for 3 days, Normal             Follow-up Information     Call  Vahe Hawkins MD.    Specialty:  Internal Medicine  Why:  If symptoms worsen or as needed  Contact information:  [address] [address]  [phone]                            Ottoniel King NP  10/25/17 2044

## 2017-12-09 ENCOUNTER — HOSPITAL ENCOUNTER (EMERGENCY)
Facility: HOSPITAL | Age: 62
Discharge: HOME | End: 2017-12-09
Attending: EMERGENCY MEDICINE
Payer: OTHER GOVERNMENT

## 2017-12-09 VITALS
BODY MASS INDEX: 37.13 KG/M2 | SYSTOLIC BLOOD PRESSURE: 191 MMHG | WEIGHT: 203 LBS | OXYGEN SATURATION: 97 % | HEART RATE: 86 BPM | DIASTOLIC BLOOD PRESSURE: 89 MMHG | TEMPERATURE: 98 F | RESPIRATION RATE: 16 BRPM

## 2017-12-09 DIAGNOSIS — J20.8 ACUTE BRONCHITIS DUE TO OTHER SPECIFIED ORGANISMS: Primary | ICD-10-CM

## 2017-12-09 DIAGNOSIS — I10 ESSENTIAL HYPERTENSION: ICD-10-CM

## 2017-12-09 PROCEDURE — 99283 EMERGENCY DEPT VISIT LOW MDM: CPT

## 2017-12-09 RX ORDER — PREDNISONE 20 MG/1
TABLET ORAL
Qty: 18 TABLET | Refills: 0 | Status: SHIPPED | OUTPATIENT
Start: 2017-12-09 | End: 2023-12-15 | Stop reason: CLARIF

## 2017-12-09 RX ORDER — PROMETHAZINE HYDROCHLORIDE AND CODEINE PHOSPHATE 6.25; 1 MG/5ML; MG/5ML
5 SOLUTION ORAL EVERY 4 HOURS PRN
Qty: 160 ML | Refills: 0 | Status: SHIPPED | OUTPATIENT
Start: 2017-12-09 | End: 2017-12-19

## 2017-12-09 RX ORDER — CEFDINIR 300 MG/1
300 CAPSULE ORAL 2 TIMES DAILY
Qty: 20 CAPSULE | Refills: 0 | Status: SHIPPED | OUTPATIENT
Start: 2017-12-09 | End: 2017-12-19

## 2017-12-09 NOTE — ED PROVIDER NOTES
Encounter Date: 12/9/2017       History     Chief Complaint   Patient presents with    Nasal Congestion     x 1 week     The history is provided by the patient.   URI   The primary symptoms include cough and wheezing. Primary symptoms do not include fever, headaches, sore throat, swollen glands, abdominal pain, nausea, vomiting, myalgias or rash. The current episode started several days ago (for approximately one week). This is a recurrent problem. The problem has been gradually worsening.   The cough began more than 1 week ago. The cough is productive. The sputum is yellow and green.   Wheezing began more than 2 days ago. Wheezing occurs intermittently. Progression: waxing and waning. The patient's medical history is significant for asthma.   Symptoms associated with the illness include congestion and rhinorrhea. The illness is not associated with chills or sinus pressure. The following treatments were addressed: Acetaminophen was ineffective. NSAIDs were ineffective.         PCP:   Vahe Hawkins MD        Review of patient's allergies indicates:  No Known Allergies  Past Medical History:   Diagnosis Date    Arthritis     Asthma     GERD (gastroesophageal reflux disease)     Hyperlipidemia     Hypertension     Insomnia 6/13/2013    Knee pain     Obesity (BMI 35.0-39.9 without comorbidity) 2/20/2017     Past Surgical History:   Procedure Laterality Date    APPENDECTOMY      BLADDER REPAIR      HERNIA REPAIR      KNEE SURGERY      right    TUBAL LIGATION      TYMPANIC MEMBRANE REPAIR       Family History   Problem Relation Age of Onset    Heart disease Mother     Hypertension Mother     Diabetes Mother     No Known Problems Father      unknown who he was per pt    Breast cancer Neg Hx     Colon cancer Neg Hx     Ovarian cancer Neg Hx     Thrombosis Neg Hx      Social History   Substance Use Topics    Smoking status: Never Smoker    Smokeless tobacco: Never Used    Alcohol use No      Review of Systems   Constitutional: Negative for chills and fever.   HENT: Positive for congestion, postnasal drip and rhinorrhea. Negative for sinus pain, sinus pressure and sore throat.    Eyes: Negative for pain, discharge and visual disturbance.   Respiratory: Positive for cough, chest tightness and wheezing. Negative for shortness of breath and stridor.    Cardiovascular: Negative for chest pain and palpitations.   Gastrointestinal: Negative for abdominal pain, diarrhea, nausea and vomiting.   Genitourinary: Negative for dysuria.   Musculoskeletal: Negative for back pain, myalgias and neck pain.   Skin: Negative for rash.   Neurological: Negative for weakness and headaches.   Hematological: Does not bruise/bleed easily.   Psychiatric/Behavioral: Negative for confusion.       Physical Exam     Initial Vitals [12/09/17 1738]   BP Pulse Resp Temp SpO2   (!) 191/89 86 16 98 °F (36.7 °C) 97 %      MAP       123         Physical Exam    Nursing note and vitals reviewed.  Constitutional: Vital signs are normal. She appears well-developed and well-nourished. She is Obese . She is cooperative. She does not appear ill. No distress.   HENT:   Head: Normocephalic and atraumatic.   Right Ear: Hearing, tympanic membrane, external ear and ear canal normal.   Left Ear: Hearing, tympanic membrane, external ear and ear canal normal.   Nose: Mucosal edema present.   Mouth/Throat: Uvula is midline and mucous membranes are normal. Posterior oropharyngeal erythema present.   Eyes: Conjunctivae, EOM and lids are normal. Pupils are equal, round, and reactive to light.   Neck: Trachea normal and normal range of motion. Neck supple.   Cardiovascular: Normal rate, regular rhythm, intact distal pulses and normal pulses.   Pulmonary/Chest: Effort normal. No respiratory distress. She has wheezes (improves with coughing) in the right upper field. She has no rhonchi. She has no rales.   Abdominal: Soft. She exhibits no distension and no  mass. There is no tenderness. There is no rebound and no guarding.   Musculoskeletal: Normal range of motion. She exhibits no edema or tenderness.   Lymphadenopathy:     She has no cervical adenopathy.   Neurological: She is alert and oriented to person, place, and time. She has normal strength. Gait normal. GCS eye subscore is 4. GCS verbal subscore is 5. GCS motor subscore is 6.   Skin: Skin is warm, dry and intact. Capillary refill takes less than 2 seconds. No rash noted.   Psychiatric: She has a normal mood and affect. Her speech is normal and behavior is normal. Thought content normal.         ED Course   Procedures            Medical Decision Making:   History:   Old Records Summarized: records from clinic visits.                     Clinical Impression:       ICD-10-CM ICD-9-CM   1. Acute bronchitis due to other specified organisms J20.8 466.0   2. Essential hypertension I10 401.9         Disposition:   Disposition: Discharged  Condition: Stable  I discussed with patient that the evaluation in the emergency department does not suggest any emergent or life threatening medical condition requiring immediate intervention beyond what was provided in the ED, and I believe patient is safe for discharge.  Regardless, an unremarkable evaluation in the ED does not preclude the development or presence of a serious of life threatening condition. As such, patient was instructed to return immediately for any worsening or change in current symptoms. I also discussed the results of my evaluation and diagnosis with patient and she concurs with the evaluation and management plan.  Detailed written and verbal instructions provided to patient and she expressed a verbal understanding of the discharge instructions and overall management plan. Reiterated the importance of medication administration and safety and advised patient to follow up with primary care provider in 3-5 days or sooner if needed.  Also advised patient to return  to the ER for any complications.     Regarding BRONCHITIS, for treatment, I encouraged patient to refrain from smoking, drink plenty of fluids, rest, take medications as prescribed, and to use a humidifier or steam in the bathroom. Patient instructed to notify primary care provider if: they have a cough most days or have a cough that returns frequently; are coughing up blood; have a high fever or shaking chills; have a low-grade fever for three or more days; develop thick, greenish mucus, especially if it has a bad smell; and feel short of breath or have chest pain. For prevention, discussed with patient the importance of not smoking, getting annual influenza vaccines, reducing exposure to air pollution, and to frequently wash hands to avoid spread of infection.  Instructed patient to return to emergency department if they experience chest pain or shortness of breath and to follow up with primary care provider for re-evaluation.    Regarding HYPERTENSION, I advised patient to: keep a record of blood pressure results; take your blood pressure medication exactly as directed without skipping doses; avoid medications that contain heart stimulants, including over-the-counter drugs such as decongestants; maintain a healthy weight; cut back on sodium intake (i.e., limit canned, dried, packaged, and fast foods and dont add salt to food); follow the DASH (Dietary Approaches to Stop Hypertension) eating plan which recommends vegetables, fruits, whole gains, and other heart healthy foods; begin an exercise program that includes  aerobic exercise 3 to 4 times a week for an average of 40 minutes at a time (with approval of cardiologist or primary care provider); limit drinks that contain alcohol and caffeine; control levels of emotional stress; and seek emergency care for any shortness of breath, chest pain, difficulty speaking, confusion, or visual changes.               New Prescriptions    CEFDINIR (OMNICEF) 300 MG CAPSULE     Take 1 capsule (300 mg total) by mouth 2 (two) times daily.    PREDNISONE (DELTASONE) 20 MG TABLET    Take 1 tablet 3 times per day for 3 days, then Take 1 tablet 2 times per day for 3 days, then Take 1 tablet daily for 3 days    PROMETHAZINE-CODEINE 6.25-10 MG/5 ML (PHENERGAN WITH CODEINE) 6.25-10 MG/5 ML SYRUP    Take 5 mLs by mouth every 4 (four) hours as needed for Cough.       Follow-up Information     Schedule an appointment as soon as possible for a visit  with Vahe Hawkins MD.    Specialty:  Internal Medicine  Contact information:  [address] [place] [id number]  [phone]                              Ottoniel King NP  12/09/17 1530

## 2017-12-10 NOTE — DISCHARGE INSTRUCTIONS
Do not drive or operate heavy machinery after taking cough medication as it contains a narcotic.    Follow up with primary care provider.     Complete full course of antibiotics.  Drink plenty of fluid and get lots of rest.    Your antibiotics and oral steroids have been sent electronically to [facility]/pharmacy in [place].

## 2018-03-12 ENCOUNTER — HOSPITAL ENCOUNTER (OUTPATIENT)
Dept: RADIOLOGY | Facility: HOSPITAL | Age: 63
Discharge: HOME | End: 2018-03-12
Attending: ORTHOPAEDIC SURGERY
Payer: OTHER GOVERNMENT

## 2018-03-12 ENCOUNTER — HOSPITAL ENCOUNTER (OUTPATIENT)
Dept: CARDIOLOGY | Facility: CLINIC | Age: 63
Discharge: HOME | End: 2018-03-12
Payer: OTHER GOVERNMENT

## 2018-03-12 DIAGNOSIS — Z01.818 PREOP EXAMINATION: Primary | ICD-10-CM

## 2018-03-12 DIAGNOSIS — Z01.818 PREOP EXAMINATION: ICD-10-CM

## 2018-03-12 DIAGNOSIS — M17.12 DEGENERATIVE ARTHRITIS OF LEFT KNEE: Primary | ICD-10-CM

## 2018-03-12 DIAGNOSIS — I10 HTN (HYPERTENSION): ICD-10-CM

## 2018-03-12 DIAGNOSIS — M17.12 DEGENERATIVE ARTHRITIS OF LEFT KNEE: ICD-10-CM

## 2018-03-12 DIAGNOSIS — I10 ESSENTIAL HYPERTENSION, MALIGNANT: ICD-10-CM

## 2018-03-12 DIAGNOSIS — I10 ESSENTIAL HYPERTENSION, MALIGNANT: Primary | ICD-10-CM

## 2018-03-12 PROCEDURE — 93010 ELECTROCARDIOGRAM REPORT: CPT | Mod: S$GLB,,, | Performed by: NUCLEAR MEDICINE

## 2018-03-12 PROCEDURE — 71046 X-RAY EXAM CHEST 2 VIEWS: CPT | Mod: TC,PO

## 2018-03-12 PROCEDURE — 71046 X-RAY EXAM CHEST 2 VIEWS: CPT | Mod: 26,,, | Performed by: RADIOLOGY

## 2018-04-02 ENCOUNTER — HOSPITAL ENCOUNTER (EMERGENCY)
Facility: HOSPITAL | Age: 63
Discharge: HOME | End: 2018-04-02
Attending: INTERNAL MEDICINE
Payer: OTHER GOVERNMENT

## 2018-04-02 VITALS
HEIGHT: 62 IN | OXYGEN SATURATION: 98 % | TEMPERATURE: 99 F | HEART RATE: 81 BPM | DIASTOLIC BLOOD PRESSURE: 70 MMHG | WEIGHT: 206 LBS | SYSTOLIC BLOOD PRESSURE: 119 MMHG | RESPIRATION RATE: 12 BRPM | BODY MASS INDEX: 37.91 KG/M2

## 2018-04-02 DIAGNOSIS — I82.409 DVT (DEEP VENOUS THROMBOSIS): ICD-10-CM

## 2018-04-02 DIAGNOSIS — M77.9 INFLAMMATION AROUND JOINT: ICD-10-CM

## 2018-04-02 DIAGNOSIS — R60.9 SWELLING: ICD-10-CM

## 2018-04-02 LAB
ALBUMIN SERPL BCP-MCNC: 3.3 G/DL
ALP SERPL-CCNC: 94 U/L
ALT SERPL W/O P-5'-P-CCNC: 20 U/L
ANION GAP SERPL CALC-SCNC: 8 MMOL/L
APTT BLDCRRT: 25.8 SEC
AST SERPL-CCNC: 21 U/L
BASOPHILS # BLD AUTO: 0.03 K/UL
BASOPHILS NFR BLD: 0.5 %
BILIRUB SERPL-MCNC: 0.4 MG/DL
BUN SERPL-MCNC: 11 MG/DL
CALCIUM SERPL-MCNC: 9 MG/DL
CHLORIDE SERPL-SCNC: 106 MMOL/L
CO2 SERPL-SCNC: 25 MMOL/L
CREAT SERPL-MCNC: 0.7 MG/DL
DIFFERENTIAL METHOD: ABNORMAL
EOSINOPHIL # BLD AUTO: 0.2 K/UL
EOSINOPHIL NFR BLD: 3.2 %
ERYTHROCYTE [DISTWIDTH] IN BLOOD BY AUTOMATED COUNT: 12.9 %
EST. GFR  (AFRICAN AMERICAN): >60 ML/MIN/1.73 M^2
EST. GFR  (NON AFRICAN AMERICAN): >60 ML/MIN/1.73 M^2
GLUCOSE SERPL-MCNC: 107 MG/DL
HCT VFR BLD AUTO: 34 %
HGB BLD-MCNC: 10.9 G/DL
INR PPP: 1
LYMPHOCYTES # BLD AUTO: 1.6 K/UL
LYMPHOCYTES NFR BLD: 27.1 %
MCH RBC QN AUTO: 30.6 PG
MCHC RBC AUTO-ENTMCNC: 32.1 G/DL
MCV RBC AUTO: 96 FL
MONOCYTES # BLD AUTO: 0.6 K/UL
MONOCYTES NFR BLD: 9.6 %
NEUTROPHILS # BLD AUTO: 3.5 K/UL
NEUTROPHILS NFR BLD: 59.4 %
PLATELET # BLD AUTO: 250 K/UL
PMV BLD AUTO: 10.3 FL
POTASSIUM SERPL-SCNC: 4 MMOL/L
PROT SERPL-MCNC: 6.9 G/DL
PROTHROMBIN TIME: 10 SEC
RBC # BLD AUTO: 3.56 M/UL
SODIUM SERPL-SCNC: 139 MMOL/L
WBC # BLD AUTO: 5.95 K/UL

## 2018-04-02 PROCEDURE — 85730 THROMBOPLASTIN TIME PARTIAL: CPT

## 2018-04-02 PROCEDURE — 85025 COMPLETE CBC W/AUTO DIFF WBC: CPT

## 2018-04-02 PROCEDURE — 80053 COMPREHEN METABOLIC PANEL: CPT

## 2018-04-02 PROCEDURE — 85610 PROTHROMBIN TIME: CPT

## 2018-04-02 PROCEDURE — 99284 EMERGENCY DEPT VISIT MOD MDM: CPT | Mod: 25

## 2018-04-02 NOTE — ED NOTES
Attempted to consult Dr Veronica spoke with Jo Ann ([phone]) states that she will try to get the message to MD due tohim being at [facility]. States that if unable to reach office she will involve her direct supervisor

## 2018-04-02 NOTE — ED NOTES
Advised patient of Ortho office follow up Friday 4/6/18, at 1030. Wrote this information on patient discharge information. Patient verbalized understanding.

## 2018-04-02 NOTE — ED PROVIDER NOTES
Encounter Date: 4/2/2018       History     Chief Complaint   Patient presents with    Ankle Pain     knee surgery on 3/19 pt noticed bruising to left ankle, states painful,report HH nurse told her to come get checked      Presents complaining of swelling with associated ecchymosis over the ankle and foot, left site. States knee replacement surgery on 3/19/18 after which noticed some ecchymosis on the thigh but during the weekend started to noticed the bruise also on her ankle and foot. Some tenderness to palpation over foot area. No recent injury, taking aspirin 81 mg daily.      The history is provided by the patient.     Review of patient's allergies indicates:  No Known Allergies  Past Medical History:   Diagnosis Date    Arthritis     Asthma     GERD (gastroesophageal reflux disease)     Hyperlipidemia     Hypertension     Insomnia 6/13/2013    Knee pain     Obesity (BMI 35.0-39.9 without comorbidity) 2/20/2017     Past Surgical History:   Procedure Laterality Date    APPENDECTOMY      BLADDER REPAIR      HERNIA REPAIR      JOINT REPLACEMENT      KNEE SURGERY      right    TUBAL LIGATION      TYMPANIC MEMBRANE REPAIR       Family History   Problem Relation Age of Onset    Heart disease Mother     Hypertension Mother     Diabetes Mother     No Known Problems Father      unknown who he was per pt    Breast cancer Neg Hx     Colon cancer Neg Hx     Ovarian cancer Neg Hx     Thrombosis Neg Hx      Social History   Substance Use Topics    Smoking status: Never Smoker    Smokeless tobacco: Never Used    Alcohol use No     Review of Systems   Constitutional: Negative for appetite change, chills and fever.   HENT: Negative for dental problem, ear pain and sore throat.    Eyes: Negative for visual disturbance.   Respiratory: Negative for cough and shortness of breath.    Cardiovascular: Negative for chest pain and palpitations.   Gastrointestinal: Negative for abdominal pain, blood in stool,  diarrhea and vomiting.   Genitourinary: Negative for dysuria, pelvic pain and vaginal discharge.   Musculoskeletal: Positive for joint swelling. Negative for back pain and myalgias.   Skin: Positive for color change. Negative for rash.   Neurological: Negative for weakness and headaches.   Psychiatric/Behavioral: Negative for sleep disturbance.   All other systems reviewed and are negative.      Physical Exam     Initial Vitals [04/02/18 1325]   BP Pulse Resp Temp SpO2   125/60 92 16 98.2 °F (36.8 °C) 98 %      MAP       81.67         Physical Exam    Nursing note and vitals reviewed.  Constitutional: She appears well-developed and well-nourished. No distress.   HENT:   Head: Normocephalic and atraumatic.   Mouth/Throat: Oropharynx is clear and moist.   Eyes: Conjunctivae are normal. Pupils are equal, round, and reactive to light.   Neck: Normal range of motion.   Cardiovascular: Normal rate, regular rhythm, normal heart sounds and intact distal pulses.   Pulmonary/Chest: Breath sounds normal.   Abdominal: Soft. There is no tenderness.   Musculoskeletal: Normal range of motion. She exhibits edema.   Bruising of different staging among bilateral distal femur, bilateral malleolus area and lateral dorsum of the foot on left side; closed surgical wound noticed, Full AROM, No point tenderness, N-V intact; Pitting edema +2 noticed      Neurological: She is alert and oriented to person, place, and time. She has normal strength.   Skin: Skin is warm and dry. Capillary refill takes less than 2 seconds. No rash noted.   Bruising of different staging among bilateral distal femur, bilateral malleolus area and lateral dorsum of the foot on left side; closed surgical wound noticed, Full AROM, No point tenderness, N-V intact; Pitting edema +2 noticed         Psychiatric: Her behavior is normal.         ED Course   Procedures  Labs Reviewed   CBC W/ AUTO DIFFERENTIAL - Abnormal; Notable for the following:        Result Value    RBC  3.56 (*)     Hemoglobin 10.9 (*)     Hematocrit 34.0 (*)     All other components within normal limits   COMPREHENSIVE METABOLIC PANEL - Abnormal; Notable for the following:     Albumin 3.3 (*)     All other components within normal limits   PROTIME-INR   APTT   COMPREHENSIVE METABOLIC PANEL         Results for orders placed or performed during the hospital encounter of 04/02/18   CBC auto differential   Result Value Ref Range    WBC 5.95 3.90 - 12.70 K/uL    RBC 3.56 (L) 4.00 - 5.40 M/uL    Hemoglobin 10.9 (L) 12.0 - 16.0 g/dL    Hematocrit 34.0 (L) 37.0 - 48.5 %    MCV 96 82 - 98 fL    MCH 30.6 27.0 - 31.0 pg    MCHC 32.1 32.0 - 36.0 g/dL    RDW 12.9 11.5 - 14.5 %    Platelets 250 150 - 350 K/uL    MPV 10.3 9.2 - 12.9 fL    Gran # (ANC) 3.5 1.8 - 7.7 K/uL    Lymph # 1.6 1.0 - 4.8 K/uL    Mono # 0.6 0.3 - 1.0 K/uL    Eos # 0.2 0.0 - 0.5 K/uL    Baso # 0.03 0.00 - 0.20 K/uL    Gran% 59.4 38.0 - 73.0 %    Lymph% 27.1 18.0 - 48.0 %    Mono% 9.6 4.0 - 15.0 %    Eosinophil% 3.2 0.0 - 8.0 %    Basophil% 0.5 0.0 - 1.9 %    Differential Method Automated    Protime-INR   Result Value Ref Range    Prothrombin Time 10.0 9.0 - 12.5 sec    INR 1.0 0.8 - 1.2   APTT   Result Value Ref Range    aPTT 25.8 21.0 - 32.0 sec   Comprehensive metabolic panel   Result Value Ref Range    Sodium 139 136 - 145 mmol/L    Potassium 4.0 3.5 - 5.1 mmol/L    Chloride 106 95 - 110 mmol/L    CO2 25 23 - 29 mmol/L    Glucose 107 70 - 110 mg/dL    BUN, Bld 11 6 - 20 mg/dL    Creatinine 0.7 0.5 - 1.4 mg/dL    Calcium 9.0 8.7 - 10.5 mg/dL    Total Protein 6.9 6.0 - 8.4 g/dL    Albumin 3.3 (L) 3.5 - 5.2 g/dL    Total Bilirubin 0.4 0.1 - 1.0 mg/dL    Alkaline Phosphatase 94 55 - 135 U/L    AST 21 10 - 40 U/L    ALT 20 10 - 44 U/L    Anion Gap 8 8 - 16 mmol/L    eGFR if African American >60.0 >60 mL/min/1.73 m^2    eGFR if non African American >60.0 >60 mL/min/1.73 m^2     Imaging Results          X-Ray Chest PA And Lateral (Final result)  Result time  04/02/18 16:21:13    Final result by Melvin Brown III, MD (04/02/18 16:21:13)                 Impression:         Negative two-view chest x-ray.      Electronically signed by: MELVIN BROWN MD  Date:     04/02/18  Time:    16:21              Narrative:    No Two-view chest x-ray.    Clinical indication: Shortness of breath     Heart size is normal. The lung fields are clear. No acute pulmonary infiltrate.  Mild degenerative changes noted in the thoracic spine.                             US Lower Extremity Veins Left (Final result)     Abnormal  Result time 04/02/18 14:59:14    Final result by Melvin Cerrato MD (04/02/18 14:59:14)                 Impression:       Small segment of the left popliteal vein is noncompressible and slightly expanded consistent with acute DVT.      Electronically signed by: MELVIN CERRATO MD  Date:     04/02/18  Time:    14:59              Narrative:    Indication: Leg swelling, leg pain.    CPT code 57477.    Findings:    Grayscale, color flow, and Doppler evaluation of the left lower extremity deep venous structures was performed.      Small segment of the left popliteal vein is noncompressible and slightly expanded consistent with acute DVT.  Remaining deep venous structures display normal coaptation and response augmentation without evidence of deep venous thrombosis.                             X-Ray Ankle Complete Left (Final result)  Result time 04/02/18 14:00:38    Final result by Melvin Brown III, MD (04/02/18 14:00:38)                 Impression:     As above.  Soft tissue swelling.  No acute fracture or dislocation about the ankle.  Moderate calcaneal spurring.      Electronically signed by: MELVIN BROWN MD  Date:     04/02/18  Time:    14:00              Narrative:    Left ankle, 4 views.    Clinical indication: Left ankle pain.    There is soft tissue swelling about the left ankle.  Moderate calcaneal spurring.  No acute fracture.  No dislocation.                              X-Ray Knee Complete 4 or more Views Left (Final result)  Result time 04/02/18 14:01:18   Procedure changed from X-Ray Knee 3 View Left     Final result by Alexandre Brown III, MD (04/02/18 14:01:18)                 Impression:     No acute abnormality suggested about the left knee      Electronically signed by: ALEXANDRE BROWN MD  Date:     04/02/18  Time:    14:01              Narrative:    Left knee x-ray, 4 views.    Clinical indication: Left knee pain.    Knee prosthesis noted in satisfactory position alignment.  No acute fracture or dislocation.                            Consult: I have discussed case with Dr. Vahe Hawkins from Internal Medicine (Primary care Provider for the patient) who agrees with current management.  Consulted Service: Primary Care Provider (Internal Medicine)  Physician Consulted: Dr. GUS Hawkins  Discharge Recommendations: Agree with Eloquis  Follow Up: Notify Pt to call the office for F/U this week    Consult:  Dr. Rueda from Orthopedic Surgery (Primary Orthopedic surgeon at [facility]) :      Case discuss with Fina at Dr. Rueda Office who agree with plan and will F/U Pt on Friday at 10:30 AM          At this time pt stable, understood her diagnosis and recommended treatment with Eloquis as anticoagulation for the acute DVT. At this time no contraindication for DVT management and no contraindications for outpatient management. Pt understood red flags signs to return to the emergency room for re evaluation or DVT complications requiring inpatient management.     The patient is resting comfortably and is in no acute distress.  She states that her symptoms have improved after treatment within ER. Discussed test results, shared treatment plan, specific conditions for return, and importance of follow up with patient and family. Advised to finish her treatment with Eloquis as well. She understands and agrees with the plan as discussed.  Answered her questions at this time. She has remained hemodynamically stable throughout the ED course and is appropriate for discharge home.                  Clinical Impression:   Diagnoses of Inflammation around joint, Swelling, and DVT (deep venous thrombosis) were pertinent to this visit.    Disposition:   Disposition: Discharged  Condition: Stable                        Sal Bernardo MD  04/02/18 8119       Sal Bernardo MD  04/02/18 3959

## 2018-04-03 ENCOUNTER — PES CALL (OUTPATIENT)
Dept: ADMINISTRATIVE | Facility: CLINIC | Age: 63
End: 2018-04-03

## 2018-10-20 ENCOUNTER — HOSPITAL ENCOUNTER (INPATIENT)
Facility: HOSPITAL | Age: 63
LOS: 3 days | Discharge: HOME | DRG: 355 | End: 2018-10-23
Attending: EMERGENCY MEDICINE | Admitting: INTERNAL MEDICINE
Payer: OTHER GOVERNMENT

## 2018-10-20 DIAGNOSIS — K43.9 SPIGELIAN HERNIA: ICD-10-CM

## 2018-10-20 DIAGNOSIS — K46.0 INCARCERATED HERNIA: ICD-10-CM

## 2018-10-20 DIAGNOSIS — I10 ESSENTIAL HYPERTENSION: ICD-10-CM

## 2018-10-20 DIAGNOSIS — R10.9 ABDOMINAL PAIN: ICD-10-CM

## 2018-10-20 DIAGNOSIS — K43.2 INCISIONAL HERNIA, WITHOUT OBSTRUCTION OR GANGRENE: Primary | ICD-10-CM

## 2018-10-20 LAB
ALBUMIN SERPL BCP-MCNC: 3.8 G/DL
ALP SERPL-CCNC: 89 U/L
ALT SERPL W/O P-5'-P-CCNC: 19 U/L
ANION GAP SERPL CALC-SCNC: 10 MMOL/L
APTT BLDCRRT: 27.3 SEC
AST SERPL-CCNC: 25 U/L
BASOPHILS # BLD AUTO: 0.02 K/UL
BASOPHILS NFR BLD: 0.2 %
BILIRUB SERPL-MCNC: 0.5 MG/DL
BILIRUB UR QL STRIP: NEGATIVE
BUN SERPL-MCNC: 12 MG/DL
CALCIUM SERPL-MCNC: 9.5 MG/DL
CHLORIDE SERPL-SCNC: 105 MMOL/L
CLARITY UR REFRACT.AUTO: CLEAR
CO2 SERPL-SCNC: 25 MMOL/L
COLOR UR AUTO: YELLOW
CREAT SERPL-MCNC: 0.8 MG/DL
DIFFERENTIAL METHOD: NORMAL
EOSINOPHIL # BLD AUTO: 0.1 K/UL
EOSINOPHIL NFR BLD: 0.7 %
ERYTHROCYTE [DISTWIDTH] IN BLOOD BY AUTOMATED COUNT: 13 %
EST. GFR  (AFRICAN AMERICAN): >60 ML/MIN/1.73 M^2
EST. GFR  (NON AFRICAN AMERICAN): >60 ML/MIN/1.73 M^2
GLUCOSE SERPL-MCNC: 130 MG/DL
GLUCOSE UR QL STRIP: NEGATIVE
HCT VFR BLD AUTO: 44.1 %
HGB BLD-MCNC: 14.4 G/DL
HGB UR QL STRIP: NEGATIVE
INR PPP: 1
KETONES UR QL STRIP: NEGATIVE
LEUKOCYTE ESTERASE UR QL STRIP: NEGATIVE
LIPASE SERPL-CCNC: 18 U/L
LYMPHOCYTES # BLD AUTO: 2 K/UL
LYMPHOCYTES NFR BLD: 23 %
MCH RBC QN AUTO: 30.3 PG
MCHC RBC AUTO-ENTMCNC: 32.7 G/DL
MCV RBC AUTO: 93 FL
MONOCYTES # BLD AUTO: 0.7 K/UL
MONOCYTES NFR BLD: 7.5 %
NEUTROPHILS # BLD AUTO: 6.1 K/UL
NEUTROPHILS NFR BLD: 68.5 %
NITRITE UR QL STRIP: NEGATIVE
PH UR STRIP: 6 [PH] (ref 5–8)
PLATELET # BLD AUTO: 190 K/UL
PMV BLD AUTO: 11.4 FL
POTASSIUM SERPL-SCNC: 4 MMOL/L
PROT SERPL-MCNC: 7.6 G/DL
PROT UR QL STRIP: NEGATIVE
PROTHROMBIN TIME: 10.1 SEC
RBC # BLD AUTO: 4.75 M/UL
SODIUM SERPL-SCNC: 140 MMOL/L
SP GR UR STRIP: 1.01 (ref 1–1.03)
TROPONIN I SERPL DL<=0.01 NG/ML-MCNC: <0.006 NG/ML
URN SPEC COLLECT METH UR: NORMAL
UROBILINOGEN UR STRIP-ACNC: NEGATIVE EU/DL
WBC # BLD AUTO: 8.88 K/UL

## 2018-10-20 PROCEDURE — 99900035 HC TECH TIME PER 15 MIN (STAT)

## 2018-10-20 PROCEDURE — 99285 EMERGENCY DEPT VISIT HI MDM: CPT | Mod: 25

## 2018-10-20 PROCEDURE — 25000003 PHARM REV CODE 250: Performed by: EMERGENCY MEDICINE

## 2018-10-20 PROCEDURE — 96376 TX/PRO/DX INJ SAME DRUG ADON: CPT

## 2018-10-20 PROCEDURE — 84484 ASSAY OF TROPONIN QUANT: CPT

## 2018-10-20 PROCEDURE — 93010 ELECTROCARDIOGRAM REPORT: CPT | Mod: ,,, | Performed by: INTERNAL MEDICINE

## 2018-10-20 PROCEDURE — 96375 TX/PRO/DX INJ NEW DRUG ADDON: CPT

## 2018-10-20 PROCEDURE — 96374 THER/PROPH/DIAG INJ IV PUSH: CPT

## 2018-10-20 PROCEDURE — 21400001 HC TELEMETRY ROOM

## 2018-10-20 PROCEDURE — 83690 ASSAY OF LIPASE: CPT

## 2018-10-20 PROCEDURE — 85025 COMPLETE CBC W/AUTO DIFF WBC: CPT

## 2018-10-20 PROCEDURE — 85610 PROTHROMBIN TIME: CPT

## 2018-10-20 PROCEDURE — 85730 THROMBOPLASTIN TIME PARTIAL: CPT

## 2018-10-20 PROCEDURE — 25500020 PHARM REV CODE 255: Performed by: EMERGENCY MEDICINE

## 2018-10-20 PROCEDURE — 80053 COMPREHEN METABOLIC PANEL: CPT

## 2018-10-20 PROCEDURE — 81003 URINALYSIS AUTO W/O SCOPE: CPT

## 2018-10-20 PROCEDURE — 93005 ELECTROCARDIOGRAM TRACING: CPT

## 2018-10-20 PROCEDURE — 63600175 PHARM REV CODE 636 W HCPCS: Performed by: EMERGENCY MEDICINE

## 2018-10-20 PROCEDURE — 11000001 HC ACUTE MED/SURG PRIVATE ROOM

## 2018-10-20 RX ORDER — HYDRALAZINE HYDROCHLORIDE 20 MG/ML
10 INJECTION INTRAMUSCULAR; INTRAVENOUS
Status: COMPLETED | OUTPATIENT
Start: 2018-10-20 | End: 2018-10-20

## 2018-10-20 RX ORDER — ONDANSETRON 2 MG/ML
4 INJECTION INTRAMUSCULAR; INTRAVENOUS
Status: COMPLETED | OUTPATIENT
Start: 2018-10-20 | End: 2018-10-20

## 2018-10-20 RX ORDER — HYDROMORPHONE HYDROCHLORIDE 2 MG/ML
0.25 INJECTION, SOLUTION INTRAMUSCULAR; INTRAVENOUS; SUBCUTANEOUS
Status: COMPLETED | OUTPATIENT
Start: 2018-10-20 | End: 2018-10-20

## 2018-10-20 RX ORDER — SODIUM CHLORIDE 9 MG/ML
1000 INJECTION, SOLUTION INTRAVENOUS
Status: COMPLETED | OUTPATIENT
Start: 2018-10-20 | End: 2018-10-20

## 2018-10-20 RX ORDER — HYDROMORPHONE HYDROCHLORIDE 2 MG/ML
0.5 INJECTION, SOLUTION INTRAMUSCULAR; INTRAVENOUS; SUBCUTANEOUS
Status: COMPLETED | OUTPATIENT
Start: 2018-10-20 | End: 2018-10-20

## 2018-10-20 RX ADMIN — SODIUM CHLORIDE 1000 ML: 0.9 INJECTION, SOLUTION INTRAVENOUS at 10:10

## 2018-10-20 RX ADMIN — HYDROMORPHONE HYDROCHLORIDE 0.25 MG: 2 INJECTION INTRAMUSCULAR; INTRAVENOUS; SUBCUTANEOUS at 08:10

## 2018-10-20 RX ADMIN — ONDANSETRON 4 MG: 2 INJECTION INTRAMUSCULAR; INTRAVENOUS at 06:10

## 2018-10-20 RX ADMIN — HYDROMORPHONE HYDROCHLORIDE 0.5 MG: 2 INJECTION INTRAMUSCULAR; INTRAVENOUS; SUBCUTANEOUS at 06:10

## 2018-10-20 RX ADMIN — IOHEXOL 100 ML: 350 INJECTION, SOLUTION INTRAVENOUS at 09:10

## 2018-10-20 RX ADMIN — HYDRALAZINE HYDROCHLORIDE 10 MG: 20 INJECTION INTRAMUSCULAR; INTRAVENOUS at 07:10

## 2018-10-20 NOTE — ED PROVIDER NOTES
History     Chief Complaint   Patient presents with    Abdominal Pain     off and since last week with N/V/D worst today       Review of patient's allergies indicates:  No Known Allergies    History of Present Illness   HPI    10/20/2018, 6:20 PM  The history is provided by the patient and daughter Marianne Fabian is a 62 y.o. female presenting to the ED for abdominal pain.  Patient reports that she has had intermittent episodes of periumbilical abdominal pain.  However today was the worst.  Pain started approximately 7:00 a.m. this morning when it started.  It is described as a pain. It radiates to the back.  It is rated as severe.  It is worsened when she stands up straight.  Better when she remains in a fetal position.  It is associated with nausea and vomiting. She had 3 episodes of emesis.  No hematemesis.  Three episodes of loose stools.  No melena or hematochezia.  Patient denies any chest pain, chest pressure, shortness of breath, difficulty breathing, cough, dysuria, urgency, frequency.  Prior surgeries include appendectomy and hernia surgery.  Prior treatment includes taking Prilosec, Pepto-Bismol, and aspirin 81 mg.      Arrival mode:  Personal Vehicle    PCP: Vahe Hawkins MD     Allergies:  Review of patient's allergies indicates:  No Known Allergies    Past Medical History:  Past Medical History:   Diagnosis Date    Arthritis     Asthma     DVT (deep venous thrombosis)     left popliteal    GERD (gastroesophageal reflux disease)     Hyperlipidemia     Hypertension     Insomnia 6/13/2013    Knee pain     Obesity (BMI 35.0-39.9 without comorbidity) 2/20/2017       Past Surgical History:  Past Surgical History:   Procedure Laterality Date    APPENDECTOMY      BLADDER REPAIR      HERNIA REPAIR      JOINT REPLACEMENT      KNEE SURGERY      right    TUBAL LIGATION      TYMPANIC MEMBRANE REPAIR           Family History:  Family History   Problem Relation Age of Onset     Heart disease Mother     Hypertension Mother     Diabetes Mother     No Known Problems Father         unknown who he was per pt    Breast cancer Neg Hx     Colon cancer Neg Hx     Ovarian cancer Neg Hx     Thrombosis Neg Hx        Social History:  Social History     Tobacco Use    Smoking status: Never Smoker    Smokeless tobacco: Never Used   Substance and Sexual Activity    Alcohol use: No    Drug use: No    Sexual activity: Yes     Partners: Male     Comment: mut monog        Review of Systems   Review of Systems   Constitutional: Negative for fever.   HENT: Negative for sore throat.    Respiratory: Negative for shortness of breath.    Cardiovascular: Negative for chest pain.   Gastrointestinal: Positive for abdominal pain, diarrhea, nausea and vomiting.   Genitourinary: Negative for decreased urine volume, dysuria, flank pain, frequency and urgency.   Musculoskeletal: Negative for back pain.   Skin: Negative for rash.   Neurological: Negative for weakness.   Hematological: Does not bruise/bleed easily.   Psychiatric/Behavioral: The patient is not nervous/anxious.           Physical Exam     Initial Vitals [10/20/18 1818]   BP Pulse Resp Temp SpO2   (!) 202/96 78 19 99.1 °F (37.3 °C) 99 %      MAP       --          Physical Exam    Nursing Notes and Vital Signs Reviewed.  Constitutional: Patient is in mild distress. Well-developed and well-nourished.  Head: Atraumatic. Normocephalic.  Eyes: PERRL. EOM intact. Conjunctivae are not pale. No scleral icterus.  ENT: Mucous membranes are moist. Oropharynx is clear and symmetric.    Neck: Supple. Full ROM. No lymphadenopathy.  Cardiovascular: Regular rate. Regular rhythm. No murmurs, rubs, or gallops. Distal pulses are 2+ and symmetric.  Pulmonary/Chest: No respiratory distress. Clear to auscultation bilaterally. No wheezing or rales.  Abdominal: Soft and non-distended.  periumbilical tenderness. No rebound, guarding, or rigidity. Good bowel  "sounds.  Genitourinary: No CVA tenderness  Musculoskeletal: Moves all extremities. No obvious deformities. No edema. No calf tenderness.  Skin: Warm and dry.  Neurological:  Alert, awake, and appropriate.  Normal speech.  No acute focal neurological deficits are appreciated.  Psychiatric: Normal affect. Good eye contact. Appropriate in content.     ED Course   Procedures  ED Vital Signs:  Vitals:    10/20/18 1818 10/20/18 1826 10/20/18 1830 10/20/18 1851   BP: (!) 202/96   (!) 229/91   Pulse: 78 76 77 77   Resp: 19      Temp: 99.1 °F (37.3 °C)      TempSrc: Oral      SpO2: 99%   99%   Weight: 93.4 kg (205 lb 14.6 oz)      Height: 5' 2.5" (1.588 m)       10/20/18 1902 10/20/18 1931 10/20/18 2002 10/20/18 2030   BP: (!) 203/86 (!) 203/93 (!) 151/65    Pulse: 74 72 80 82   Resp: 18 (!) 27  (!) 22   Temp:       TempSrc:       SpO2: 99% 98% 97% 98%   Weight:       Height:        10/20/18 2031 10/20/18 2133   BP: (!) 155/70 (!) 152/68   Pulse: 82 81   Resp: (!) 21 (!) 23   Temp:     TempSrc:     SpO2: 99% 97%   Weight:     Height:         Abnormal Lab Results:  Labs Reviewed   COMPREHENSIVE METABOLIC PANEL - Abnormal; Notable for the following components:       Result Value    Glucose 130 (*)     All other components within normal limits   CBC W/ AUTO DIFFERENTIAL   LIPASE   TROPONIN I   URINALYSIS        All Lab Results:  Results for orders placed or performed during the hospital encounter of 10/20/18   CBC auto differential   Result Value Ref Range    WBC 8.88 3.90 - 12.70 K/uL    RBC 4.75 4.00 - 5.40 M/uL    Hemoglobin 14.4 12.0 - 16.0 g/dL    Hematocrit 44.1 37.0 - 48.5 %    MCV 93 82 - 98 fL    MCH 30.3 27.0 - 31.0 pg    MCHC 32.7 32.0 - 36.0 g/dL    RDW 13.0 11.5 - 14.5 %    Platelets 190 150 - 350 K/uL    MPV 11.4 9.2 - 12.9 fL    Gran # (ANC) 6.1 1.8 - 7.7 K/uL    Lymph # 2.0 1.0 - 4.8 K/uL    Mono # 0.7 0.3 - 1.0 K/uL    Eos # 0.1 0.0 - 0.5 K/uL    Baso # 0.02 0.00 - 0.20 K/uL    Gran% 68.5 38.0 - 73.0 %    " Lymph% 23.0 18.0 - 48.0 %    Mono% 7.5 4.0 - 15.0 %    Eosinophil% 0.7 0.0 - 8.0 %    Basophil% 0.2 0.0 - 1.9 %    Differential Method Automated    Comprehensive metabolic panel   Result Value Ref Range    Sodium 140 136 - 145 mmol/L    Potassium 4.0 3.5 - 5.1 mmol/L    Chloride 105 95 - 110 mmol/L    CO2 25 23 - 29 mmol/L    Glucose 130 (H) 70 - 110 mg/dL    BUN, Bld 12 6 - 20 mg/dL    Creatinine 0.8 0.5 - 1.4 mg/dL    Calcium 9.5 8.7 - 10.5 mg/dL    Total Protein 7.6 6.0 - 8.4 g/dL    Albumin 3.8 3.5 - 5.2 g/dL    Total Bilirubin 0.5 0.1 - 1.0 mg/dL    Alkaline Phosphatase 89 55 - 135 U/L    AST 25 10 - 40 U/L    ALT 19 10 - 44 U/L    Anion Gap 10 8 - 16 mmol/L    eGFR if African American >60.0 >60 mL/min/1.73 m^2    eGFR if non African American >60.0 >60 mL/min/1.73 m^2   Lipase   Result Value Ref Range    Lipase 18 4 - 60 U/L   Troponin I   Result Value Ref Range    Troponin I <0.006 0.000 - 0.026 ng/mL   Urinalysis   Result Value Ref Range    Specimen UA Urine, Clean Catch     Color, UA Yellow Yellow, Straw, [place]    Appearance, UA Clear Clear    pH, UA 6.0 5.0 - 8.0    Specific Gravity, UA 1.015 1.005 - 1.030    Protein, UA Negative Negative    Glucose, UA Negative Negative    Ketones, UA Negative Negative    Bilirubin (UA) Negative Negative    Occult Blood UA Negative Negative    Nitrite, UA Negative Negative    Urobilinogen, UA Negative <2.0 EU/dL    Leukocytes, UA Negative Negative              EKG Readings: (Independently Interpreted)   EKG:  Rate is 76 beats per minute. Sinus rhythm.  Left axis deviation.  There is a pattern of right bundle-branch block.  There is inverted T-wave in 1 and aVL.  This is new in comparison to the EKG dated March 12, 2018.  There is no ST segment elevation noted in today's EKG        Imaging Results:  Imaging Results          CT Abdomen Pelvis With Contrast (Final result)  Result time 10/20/18 21:36:04    Final result by Daniel Robbins Jr., MD (10/20/18 21:36:04)                  Impression:      1. Right sided spigelian hernia containing a loop of ascending colon.  There is inflammation about the herniated bowel loop.  Please correlate clinically for incarcerated hernia.  No definite bowel obstruction related to this process.  2. Sigmoid colon diverticulosis.  3. Prior cholecystectomy.      Electronically signed by: Daniel Robbins Jr., MD  Date:    10/20/2018  Time:    21:36             Narrative:    EXAMINATION:  CT ABDOMEN PELVIS WITH CONTRAST    CLINICAL HISTORY:  abdominal pain;    TECHNIQUE:  Low dose axial images, sagittal and coronal reformations were obtained from the lung bases to the pubic symphysis following the IV administration of 75 mL of Omnipaque 350 and the oral administration of 30 mL of Omnipaque 350. All CT scans at this facility use dose modulation, iterative reconstruction and/or weight based dosing when appropriate to reduce radiation dose to as low as reasonably achievable.    COMPARISON:  None.    FINDINGS:  Abdomen:    - Lower thorax:Heart size is normal.    - Lung bases: No infiltrates and no nodules.    - Liver: No focal mass.    - Gallbladder: Prior cholecystectomy.    - Bile Ducts: No evidence of intra or extra hepatic biliary ductal dilation.    - Spleen: Negative.    - Kidneys: No mass or hydronephrosis.    - Adrenals: Unremarkable.    - Pancreas: No mass or peripancreatic fat stranding.    - Retroperitoneum:  No significant adenopathy.    - Vascular: No abdominal aortic aneurysm.    - Abdominal wall:  There is a right-sided lateral ventral hernia with a neck measuring 66 mm containing loops of bowel.    Pelvis:    No pelvic mass, adenopathy, or free fluid.    Bowel/Mesentery:    Loops of colon herniates through a right lateral ventral hernia.  There is inflammation about the herniated bowel.  Small amount of fluid within the inferior aspect of the hernia sac as well.  No definite bowel obstruction.  No pneumatosis intestinalis.  Sigmoid colon  diverticulosis.    Bones:  No acute osseous abnormality and no suspicious lytic or blastic lesion.  Multilevel degenerative osteophytosis.                                   The Emergency Provider reviewed the vital signs and test results, which are outlined above.     ED Discussion     7:23 PM patient feels better.  Repeat exam exam, patient tender palpation periumbilical/right lower quadrant.  No rebound or guarding no masses.    20:44 patient requesting pain medication.  Patient tender palpation periumbilical, right lower quadrant.  No masses appreciated. No rebound or guarding. No vomiting.  Awaiting CT scan.    9:53 PM results of tests were discussed with patient and family member.  There is concerns about potential incarcerated spigelian hernia.  Patient reports that she still having pain.  As patient continues to have right lower quadrant/periumbilical pain, recommend the patient be evaluated by a general surgeon.  Patient and family members are aware that [facility] has a general surgeon and services capable of caring for her.  However she is requesting transfer to [facility].   Patient states that her primary care physician is Dr. Carlos Alberto Kingston.  Patient and family are aware that [facility] fluids and pain control EN route.  Risk of transfer include:  Death, disability, permanent neurologic damage, loss of vitals, or MVC.  Benefits of transfer include general surgery consultation.  Request put into [facility] for transfer,    10:06 PM patient change mine.  She is now requesting to be transferred to [facility].    10:12 PM Case reviewed with Dr. Goddard.   He requests that Hospital Medicine admit the patient.  Request the patient be kept NPO.  No anticoagulant other than SCDs    10:30 PM patient had emesis.  Feels better.  Awaiting to hear from Hospital Medicine service.    10:42 PM Spoke with Melvin Juárez, [facility] hospital medicine.  He will speak with Dr. Pisano regarding the patient.  Melvin  Franck NP will call me back.    10:49 PM   Melvin Juárez NP called back.  Hospital Medicine request lactic acid level.  Dr. Pisano does not feel comfortable about admitting the patient.  They request that the general surgeon, Dr. Goddard be primary admitting.    Hospital Medicine would prefer that General surgery admit the patient    11:17 PM Dr. Goddard spoke with Dr. Pisano and Dr. Aguirre.   Per Dr. Goddard,   Dr. Aguirre will be accepting the patient.  Dr. Aguirre recommends admission to Med/surg.    1:00 AM Lactic acid normal.  Results for IRVING BUCHANAN (MRN [id number]) as of 10/21/2018 00:59   Ref. Range 10/21/2018 00:05   CRP Latest Ref Range: 0.0 - 8.2 mg/L 5.5   Lactate, Nicko Latest Ref Range: 0.5 - 2.2 mmol/L 1.0       I discussed with patient and/or family/caretaker that evaluation in the ED does not suggest any emergent or life threatening medical conditions requiring immediate intervention beyond what was provided in the ED, and I believe patient is safe for discharge.  Regardless, an unremarkable evaluation in the ED does not preclude the development or presence of a serious of life threatening condition. As such, patient was instructed to return immediately for any worsening or change in current symptoms.      ED Medication(s):  Medications   0.9%  NaCl infusion (not administered)   hydromorphone (PF) injection 0.5 mg (0.5 mg Intravenous Given 10/20/18 1858)   ondansetron injection 4 mg (4 mg Intravenous Given 10/20/18 1847)   hydrALAZINE injection 10 mg (10 mg Intravenous Given 10/20/18 1935)   hydromorphone (PF) injection 0.25 mg (0.25 mg Intravenous Given 10/20/18 2047)   omnipaque 350 iohexol 100 mL (100 mLs Intravenous Given 10/20/18 2124)          MIPS Measures     Smoker? No     Hypertension: History of Hypertension: The patient has elevated blood pressure (higher than 120/80) while being treated in the ED but has a history of hypertension.         Medical Decision Making     Medical Decision Making:   Initial  Assessment:   Patient is a 62-year-old female presenting to emergency department with worsening abdominal pain that started at 7:00 a.m. this morning.  It was associated with emesis x3 as well as loose bowel movements x3. No fever, dysuria, urgency, frequency, melena, hematochezia.  Pain is located just to the right of the periumbilical region.  Also radiates to the back.  Standing upright worsens the pain, sitting in flexed position makes it better.  Patient has had prior hernia surgery, appendectomy, bladder repair, tubal ligation.  Of note, patient did suffer a popliteal DVT in March of 2018 after orthopedic surgery.  Patient currently not on oral anticoagulant.  Differential Diagnosis:   Small-bowel obstruction, acalculous cholecystitis, pancreatitis, UTI, pain from adhesions  Clinical Tests:   Lab Tests: Ordered and Reviewed  Radiological Study: Ordered and Reviewed  Medical Tests: Ordered and Reviewed  ED Management:  Patient was evaluated in the emergency department.  Patient was given multiple doses of IV narcotics as well as antiemetics.  Blood work unrevealing.  Patient was noted to be hypertensive in the emergency department.  She was given 10 mg of IV hydralazine.  Patient has history of high blood pressure, did not take her home medications today.  Patient underwent CT scan which showed spigelian hernia, possible incarceration.  Patient continued to have pain in the right lower/periumbilical region.  Therefore consultation with General surgery required.  Patient requests transfer to [facility].  Patient did change of mind, she is now requesting transfer to [facility].  Case was discussed with Dr. Goddard.  Ultimately, patient was admitted to Hospital Medicine service Dr. Aguirre.    Patient does not meet criteria for critical care.                Clinical Impression       ICD-10-CM ICD-9-CM   1. Spigelian hernia K43.9 553.29   2. Abdominal pain R10.9 789.00   3. Incarcerated hernia K46.0 552.9   4.  Essential hypertension I10 401.9       Disposition:   Disposition: Admitted  Condition: Fair  Admit to med/surg.  Dr. Aguirre admitting.                   Radha Lui,   10/21/18 0100

## 2018-10-21 PROBLEM — K43.2 INCISIONAL HERNIA, WITHOUT OBSTRUCTION OR GANGRENE: Status: ACTIVE | Noted: 2018-10-20

## 2018-10-21 LAB
ANION GAP SERPL CALC-SCNC: 10 MMOL/L
APTT BLDCRRT: 25.1 SEC
BASOPHILS # BLD AUTO: 0.01 K/UL
BASOPHILS NFR BLD: 0.1 %
BUN SERPL-MCNC: 8 MG/DL
CALCIUM SERPL-MCNC: 9 MG/DL
CHLORIDE SERPL-SCNC: 105 MMOL/L
CHOLEST SERPL-MCNC: 171 MG/DL
CHOLEST/HDLC SERPL: 2.8 {RATIO}
CO2 SERPL-SCNC: 25 MMOL/L
CREAT SERPL-MCNC: 0.7 MG/DL
CRP SERPL-MCNC: 5.5 MG/L
DIFFERENTIAL METHOD: NORMAL
EOSINOPHIL # BLD AUTO: 0.1 K/UL
EOSINOPHIL NFR BLD: 1 %
ERYTHROCYTE [DISTWIDTH] IN BLOOD BY AUTOMATED COUNT: 13.3 %
ERYTHROCYTE [SEDIMENTATION RATE] IN BLOOD BY WESTERGREN METHOD: 57 MM/HR
EST. GFR  (AFRICAN AMERICAN): >60 ML/MIN/1.73 M^2
EST. GFR  (NON AFRICAN AMERICAN): >60 ML/MIN/1.73 M^2
ESTIMATED AVG GLUCOSE: 120 MG/DL
GLUCOSE SERPL-MCNC: 107 MG/DL
HBA1C MFR BLD HPLC: 5.8 %
HCT VFR BLD AUTO: 42.5 %
HDLC SERPL-MCNC: 61 MG/DL
HDLC SERPL: 35.7 %
HGB BLD-MCNC: 13.8 G/DL
INR PPP: 1
LACTATE SERPL-SCNC: 1 MMOL/L
LDLC SERPL CALC-MCNC: 91.6 MG/DL
LYMPHOCYTES # BLD AUTO: 2.5 K/UL
LYMPHOCYTES NFR BLD: 30.7 %
MAGNESIUM SERPL-MCNC: 2.3 MG/DL
MCH RBC QN AUTO: 30.9 PG
MCHC RBC AUTO-ENTMCNC: 32.5 G/DL
MCV RBC AUTO: 95 FL
MONOCYTES # BLD AUTO: 0.6 K/UL
MONOCYTES NFR BLD: 7.1 %
NEUTROPHILS # BLD AUTO: 4.9 K/UL
NEUTROPHILS NFR BLD: 61.1 %
NONHDLC SERPL-MCNC: 110 MG/DL
PHOSPHATE SERPL-MCNC: 2.9 MG/DL
PLATELET # BLD AUTO: 188 K/UL
PMV BLD AUTO: 11.5 FL
POTASSIUM SERPL-SCNC: 3.6 MMOL/L
PROCALCITONIN SERPL IA-MCNC: 0.03 NG/ML
PROTHROMBIN TIME: 10.3 SEC
RBC # BLD AUTO: 4.47 M/UL
SODIUM SERPL-SCNC: 140 MMOL/L
TRIGL SERPL-MCNC: 92 MG/DL
TSH SERPL DL<=0.005 MIU/L-ACNC: 3.1 UIU/ML
WBC # BLD AUTO: 7.99 K/UL

## 2018-10-21 PROCEDURE — 63600175 PHARM REV CODE 636 W HCPCS: Performed by: NURSE PRACTITIONER

## 2018-10-21 PROCEDURE — 11000001 HC ACUTE MED/SURG PRIVATE ROOM

## 2018-10-21 PROCEDURE — 99900031 HC PATIENT EDUCATION (STAT)

## 2018-10-21 PROCEDURE — 83605 ASSAY OF LACTIC ACID: CPT

## 2018-10-21 PROCEDURE — 83735 ASSAY OF MAGNESIUM: CPT

## 2018-10-21 PROCEDURE — 84100 ASSAY OF PHOSPHORUS: CPT

## 2018-10-21 PROCEDURE — 63600175 PHARM REV CODE 636 W HCPCS: Performed by: EMERGENCY MEDICINE

## 2018-10-21 PROCEDURE — 84443 ASSAY THYROID STIM HORMONE: CPT

## 2018-10-21 PROCEDURE — 94799 UNLISTED PULMONARY SVC/PX: CPT

## 2018-10-21 PROCEDURE — 21400001 HC TELEMETRY ROOM

## 2018-10-21 PROCEDURE — 86140 C-REACTIVE PROTEIN: CPT

## 2018-10-21 PROCEDURE — 36415 COLL VENOUS BLD VENIPUNCTURE: CPT

## 2018-10-21 PROCEDURE — 84145 PROCALCITONIN (PCT): CPT

## 2018-10-21 PROCEDURE — C9113 INJ PANTOPRAZOLE SODIUM, VIA: HCPCS | Performed by: EMERGENCY MEDICINE

## 2018-10-21 PROCEDURE — 99232 SBSQ HOSP IP/OBS MODERATE 35: CPT | Mod: ,,, | Performed by: SURGERY

## 2018-10-21 PROCEDURE — 85730 THROMBOPLASTIN TIME PARTIAL: CPT

## 2018-10-21 PROCEDURE — 85025 COMPLETE CBC W/AUTO DIFF WBC: CPT

## 2018-10-21 PROCEDURE — 80061 LIPID PANEL: CPT

## 2018-10-21 PROCEDURE — 83036 HEMOGLOBIN GLYCOSYLATED A1C: CPT

## 2018-10-21 PROCEDURE — 85652 RBC SED RATE AUTOMATED: CPT

## 2018-10-21 PROCEDURE — 80048 BASIC METABOLIC PNL TOTAL CA: CPT

## 2018-10-21 PROCEDURE — 25000003 PHARM REV CODE 250: Performed by: EMERGENCY MEDICINE

## 2018-10-21 PROCEDURE — 85610 PROTHROMBIN TIME: CPT

## 2018-10-21 RX ORDER — IBUPROFEN 200 MG
24 TABLET ORAL
Status: DISCONTINUED | OUTPATIENT
Start: 2018-10-21 | End: 2018-10-23 | Stop reason: HOSPADM

## 2018-10-21 RX ORDER — SODIUM CHLORIDE 9 MG/ML
INJECTION, SOLUTION INTRAVENOUS CONTINUOUS
Status: DISCONTINUED | OUTPATIENT
Start: 2018-10-21 | End: 2018-10-22

## 2018-10-21 RX ORDER — HYDROMORPHONE HYDROCHLORIDE 1 MG/ML
0.5 INJECTION, SOLUTION INTRAMUSCULAR; INTRAVENOUS; SUBCUTANEOUS ONCE
Status: COMPLETED | OUTPATIENT
Start: 2018-10-21 | End: 2018-10-21

## 2018-10-21 RX ORDER — SODIUM CHLORIDE 0.9 % (FLUSH) 0.9 %
5 SYRINGE (ML) INJECTION
Status: DISCONTINUED | OUTPATIENT
Start: 2018-10-21 | End: 2018-10-23 | Stop reason: HOSPADM

## 2018-10-21 RX ORDER — IBUPROFEN 200 MG
16 TABLET ORAL
Status: DISCONTINUED | OUTPATIENT
Start: 2018-10-21 | End: 2018-10-23 | Stop reason: HOSPADM

## 2018-10-21 RX ORDER — ONDANSETRON 2 MG/ML
4 INJECTION INTRAMUSCULAR; INTRAVENOUS EVERY 6 HOURS PRN
Status: DISCONTINUED | OUTPATIENT
Start: 2018-10-21 | End: 2018-10-22

## 2018-10-21 RX ORDER — PANTOPRAZOLE SODIUM 40 MG/10ML
40 INJECTION, POWDER, LYOPHILIZED, FOR SOLUTION INTRAVENOUS DAILY
Status: DISCONTINUED | OUTPATIENT
Start: 2018-10-21 | End: 2018-10-22

## 2018-10-21 RX ORDER — HYDRALAZINE HYDROCHLORIDE 20 MG/ML
10 INJECTION INTRAMUSCULAR; INTRAVENOUS EVERY 6 HOURS PRN
Status: DISCONTINUED | OUTPATIENT
Start: 2018-10-21 | End: 2018-10-22

## 2018-10-21 RX ORDER — ACETAMINOPHEN 325 MG/1
650 TABLET ORAL EVERY 8 HOURS PRN
Status: DISCONTINUED | OUTPATIENT
Start: 2018-10-21 | End: 2018-10-22 | Stop reason: SDUPTHER

## 2018-10-21 RX ORDER — HYDROMORPHONE HYDROCHLORIDE 1 MG/ML
0.5 INJECTION, SOLUTION INTRAMUSCULAR; INTRAVENOUS; SUBCUTANEOUS EVERY 4 HOURS PRN
Status: DISCONTINUED | OUTPATIENT
Start: 2018-10-21 | End: 2018-10-22

## 2018-10-21 RX ORDER — GLUCAGON 1 MG
1 KIT INJECTION
Status: DISCONTINUED | OUTPATIENT
Start: 2018-10-21 | End: 2018-10-23 | Stop reason: HOSPADM

## 2018-10-21 RX ORDER — LIDOCAINE HYDROCHLORIDE 10 MG/ML
1 INJECTION, SOLUTION EPIDURAL; INFILTRATION; INTRACAUDAL; PERINEURAL ONCE
Status: DISCONTINUED | OUTPATIENT
Start: 2018-10-21 | End: 2018-10-22

## 2018-10-21 RX ADMIN — HYDROMORPHONE HYDROCHLORIDE 0.5 MG: 1 INJECTION, SOLUTION INTRAMUSCULAR; INTRAVENOUS; SUBCUTANEOUS at 10:10

## 2018-10-21 RX ADMIN — SODIUM CHLORIDE: 0.9 INJECTION, SOLUTION INTRAVENOUS at 01:10

## 2018-10-21 RX ADMIN — PANTOPRAZOLE SODIUM 40 MG: 40 INJECTION, POWDER, FOR SOLUTION INTRAVENOUS at 09:10

## 2018-10-21 RX ADMIN — ONDANSETRON 4 MG: 2 INJECTION, SOLUTION INTRAMUSCULAR; INTRAVENOUS at 01:10

## 2018-10-21 RX ADMIN — HYDROMORPHONE HYDROCHLORIDE 0.5 MG: 1 INJECTION, SOLUTION INTRAMUSCULAR; INTRAVENOUS; SUBCUTANEOUS at 11:10

## 2018-10-21 RX ADMIN — HYDRALAZINE HYDROCHLORIDE 10 MG: 20 INJECTION INTRAMUSCULAR; INTRAVENOUS at 08:10

## 2018-10-21 RX ADMIN — HYDRALAZINE HYDROCHLORIDE 10 MG: 20 INJECTION INTRAMUSCULAR; INTRAVENOUS at 03:10

## 2018-10-21 RX ADMIN — HYDROMORPHONE HYDROCHLORIDE 0.5 MG: 1 INJECTION, SOLUTION INTRAMUSCULAR; INTRAVENOUS; SUBCUTANEOUS at 07:10

## 2018-10-21 RX ADMIN — HYDROMORPHONE HYDROCHLORIDE 0.5 MG: 1 INJECTION, SOLUTION INTRAMUSCULAR; INTRAVENOUS; SUBCUTANEOUS at 03:10

## 2018-10-21 RX ADMIN — HYDRALAZINE HYDROCHLORIDE 10 MG: 20 INJECTION INTRAMUSCULAR; INTRAVENOUS at 12:10

## 2018-10-21 NOTE — ED NOTES
Straight stick performed to left hand for additional lab work. Pt tolerated. drsg applied. No active bleeding noted.

## 2018-10-21 NOTE — PLAN OF CARE
Problem: Patient Care Overview  Goal: Plan of Care Review  Outcome: Ongoing (interventions implemented as appropriate)  Fall precautions maintained. Pt free from falls/injuries.  Patient complains of pain. Pain controlled with PRN meds.  Antibiotics given as prescribed.  Ambulates and repositions independently.   Plan of care and medications discussed with patient.  Patient verbalized understanding.  Bed locked and low, call bell within reach.  Chart check done. Will continue to monitor.

## 2018-10-21 NOTE — SUBJECTIVE & OBJECTIVE
Past Medical History:   Diagnosis Date    Arthritis     Asthma     DVT (deep venous thrombosis)     left popliteal    GERD (gastroesophageal reflux disease)     Hyperlipidemia     Hypertension     Insomnia 6/13/2013    Knee pain     Obesity (BMI 35.0-39.9 without comorbidity) 2/20/2017       Past Surgical History:   Procedure Laterality Date    APPENDECTOMY      BLADDER REPAIR      HERNIA REPAIR      JOINT REPLACEMENT      KNEE SURGERY      right    TUBAL LIGATION      TYMPANIC MEMBRANE REPAIR         Review of patient's allergies indicates:  No Known Allergies    No current facility-administered medications on file prior to encounter.      Current Outpatient Medications on File Prior to Encounter   Medication Sig    ALBUTEROL INHL Inhale into the lungs.    benazepril (LOTENSIN) 10 MG tablet Take 10 mg by mouth once daily.    furosemide (LASIX) 20 MG tablet Take 1 tablet (20 mg total) by mouth once daily.    meloxicam (MOBIC) 15 MG tablet TAKE 1 TABLET DAILY BY MOUTH WITH FOOD    montelukast (SINGULAIR) 10 mg tablet Take 10 mg by mouth every evening.    nabumetone (RELAFEN) 500 MG tablet Take 500 mg by mouth once daily.    pantoprazole (PROTONIX) 40 MG tablet Take 40 mg by mouth once daily.     potassium chloride (KLOR-CON) 10 MEQ TbSR Take 1 tablet (10 mEq total) by mouth once daily.    predniSONE (DELTASONE) 20 MG tablet Take 1 tablet 3 times per day for 3 days, then Take 1 tablet 2 times per day for 3 days, then Take 1 tablet daily for 3 days    zolpidem (AMBIEN) 5 MG Tab Take 1 tablet (5 mg total) by mouth nightly as needed.     Family History     Problem Relation (Age of Onset)    Diabetes Mother    Heart disease Mother    Hypertension Mother    No Known Problems Father        Tobacco Use    Smoking status: Never Smoker    Smokeless tobacco: Never Used   Substance and Sexual Activity    Alcohol use: No    Drug use: No    Sexual activity: Yes     Partners: Male     Comment: mut  monog     Review of Systems   Constitutional: Positive for fatigue. Negative for chills, diaphoresis and fever.   HENT: Negative for congestion, sore throat and voice change.    Eyes: Negative for photophobia and visual disturbance.   Respiratory: Negative for cough, shortness of breath, wheezing and stridor.    Cardiovascular: Negative for chest pain and leg swelling.   Gastrointestinal: Positive for abdominal pain, nausea and vomiting. Negative for abdominal distention, constipation and diarrhea.   Endocrine: Negative for polydipsia, polyphagia and polyuria.   Genitourinary: Negative for difficulty urinating, dysuria, flank pain, pelvic pain, urgency and vaginal discharge.   Musculoskeletal: Negative for back pain, joint swelling, neck pain and neck stiffness.   Skin: Negative for color change and rash.   Allergic/Immunologic: Negative for immunocompromised state.   Neurological: Negative for dizziness, syncope, weakness, numbness and headaches.   Hematological: Does not bruise/bleed easily.   Psychiatric/Behavioral: Negative for agitation, behavioral problems and confusion.     Objective:     Vital Signs (Most Recent):  Temp: 98.2 °F (36.8 °C) (10/21/18 0328)  Pulse: 75 (10/21/18 0328)  Resp: 20 (10/21/18 0328)  BP: (!) 173/72(reported elevated bp to denia) (10/21/18 0328)  SpO2: 97 % (10/21/18 0328) Vital Signs (24h Range):  Temp:  [98.2 °F (36.8 °C)-99.1 °F (37.3 °C)] 98.2 °F (36.8 °C)  Pulse:  [72-85] 75  Resp:  [17-27] 20  SpO2:  [93 %-99 %] 97 %  BP: (145-229)/(65-96) 173/72     Weight: 93.4 kg (205 lb 14.6 oz)  Body mass index is 37.06 kg/m².    Physical Exam   Constitutional: She is oriented to person, place, and time. She appears well-developed and well-nourished. No distress.   HENT:   Head: Normocephalic and atraumatic.   Nose: Nose normal.   Eyes: Conjunctivae and EOM are normal. Pupils are equal, round, and reactive to light. No scleral icterus.   Neck: Normal range of motion. Neck supple. No tracheal  deviation present.   Cardiovascular: Normal rate, regular rhythm, normal heart sounds and intact distal pulses.   No murmur heard.  Pulmonary/Chest: Effort normal and breath sounds normal. No stridor. No respiratory distress. She has no wheezes. She has no rales.   Abdominal: Soft. She exhibits no distension. There is tenderness. There is no guarding.   obese   Genitourinary:   Genitourinary Comments: deferred   Musculoskeletal: Normal range of motion. She exhibits no edema or deformity.   Neurological: She is alert and oriented to person, place, and time. No cranial nerve deficit.   Skin: Skin is warm and dry. Capillary refill takes less than 2 seconds. No rash noted. She is not diaphoretic.   Psychiatric: She has a normal mood and affect. Her behavior is normal. Judgment and thought content normal.   Nursing note and vitals reviewed.        CRANIAL NERVES     CN III, IV, VI   Pupils are equal, round, and reactive to light.  Extraocular motions are normal.        Significant Labs: All pertinent labs within the past 24 hours have been reviewed.  Results for orders placed or performed during the hospital encounter of 10/20/18   CBC auto differential   Result Value Ref Range    WBC 8.88 3.90 - 12.70 K/uL    RBC 4.75 4.00 - 5.40 M/uL    Hemoglobin 14.4 12.0 - 16.0 g/dL    Hematocrit 44.1 37.0 - 48.5 %    MCV 93 82 - 98 fL    MCH 30.3 27.0 - 31.0 pg    MCHC 32.7 32.0 - 36.0 g/dL    RDW 13.0 11.5 - 14.5 %    Platelets 190 150 - 350 K/uL    MPV 11.4 9.2 - 12.9 fL    Gran # (ANC) 6.1 1.8 - 7.7 K/uL    Lymph # 2.0 1.0 - 4.8 K/uL    Mono # 0.7 0.3 - 1.0 K/uL    Eos # 0.1 0.0 - 0.5 K/uL    Baso # 0.02 0.00 - 0.20 K/uL    Gran% 68.5 38.0 - 73.0 %    Lymph% 23.0 18.0 - 48.0 %    Mono% 7.5 4.0 - 15.0 %    Eosinophil% 0.7 0.0 - 8.0 %    Basophil% 0.2 0.0 - 1.9 %    Differential Method Automated    Comprehensive metabolic panel   Result Value Ref Range    Sodium 140 136 - 145 mmol/L    Potassium 4.0 3.5 - 5.1 mmol/L    Chloride  105 95 - 110 mmol/L    CO2 25 23 - 29 mmol/L    Glucose 130 (H) 70 - 110 mg/dL    BUN, Bld 12 6 - 20 mg/dL    Creatinine 0.8 0.5 - 1.4 mg/dL    Calcium 9.5 8.7 - 10.5 mg/dL    Total Protein 7.6 6.0 - 8.4 g/dL    Albumin 3.8 3.5 - 5.2 g/dL    Total Bilirubin 0.5 0.1 - 1.0 mg/dL    Alkaline Phosphatase 89 55 - 135 U/L    AST 25 10 - 40 U/L    ALT 19 10 - 44 U/L    Anion Gap 10 8 - 16 mmol/L    eGFR if African American >60.0 >60 mL/min/1.73 m^2    eGFR if non African American >60.0 >60 mL/min/1.73 m^2   Lipase   Result Value Ref Range    Lipase 18 4 - 60 U/L   Troponin I   Result Value Ref Range    Troponin I <0.006 0.000 - 0.026 ng/mL   Urinalysis   Result Value Ref Range    Specimen UA Urine, Clean Catch     Color, UA Yellow Yellow, Straw, [place]    Appearance, UA Clear Clear    pH, UA 6.0 5.0 - 8.0    Specific Gravity, UA 1.015 1.005 - 1.030    Protein, UA Negative Negative    Glucose, UA Negative Negative    Ketones, UA Negative Negative    Bilirubin (UA) Negative Negative    Occult Blood UA Negative Negative    Nitrite, UA Negative Negative    Urobilinogen, UA Negative <2.0 EU/dL    Leukocytes, UA Negative Negative   APTT   Result Value Ref Range    aPTT 27.3 21.0 - 32.0 sec   Protime-INR   Result Value Ref Range    Prothrombin Time 10.1 9.0 - 12.5 sec    INR 1.0 0.8 - 1.2   Lactic acid, plasma   Result Value Ref Range    Lactate (Lactic Acid) 1.0 0.5 - 2.2 mmol/L   C-reactive protein   Result Value Ref Range    CRP 5.5 0.0 - 8.2 mg/L   Procalcitonin   Result Value Ref Range    Procalcitonin 0.03 <0.25 ng/mL       Significant Imaging: I have reviewed all pertinent imaging results/findings within the past 24 hours.   Imaging Results          CT Abdomen Pelvis With Contrast (Final result)  Result time 10/20/18 21:36:04    Final result by Daniel Robbins Jr., MD (10/20/18 21:36:04)                 Impression:      1. Right sided spigelian hernia containing a loop of ascending colon.  There is inflammation about the  herniated bowel loop.  Please correlate clinically for incarcerated hernia.  No definite bowel obstruction related to this process.  2. Sigmoid colon diverticulosis.  3. Prior cholecystectomy.      Electronically signed by: Daniel Robbins Jr., MD  Date:    10/20/2018  Time:    21:36             Narrative:    EXAMINATION:  CT ABDOMEN PELVIS WITH CONTRAST    CLINICAL HISTORY:  abdominal pain;    TECHNIQUE:  Low dose axial images, sagittal and coronal reformations were obtained from the lung bases to the pubic symphysis following the IV administration of 75 mL of Omnipaque 350 and the oral administration of 30 mL of Omnipaque 350. All CT scans at this facility use dose modulation, iterative reconstruction and/or weight based dosing when appropriate to reduce radiation dose to as low as reasonably achievable.    COMPARISON:  None.    FINDINGS:  Abdomen:    - Lower thorax:Heart size is normal.    - Lung bases: No infiltrates and no nodules.    - Liver: No focal mass.    - Gallbladder: Prior cholecystectomy.    - Bile Ducts: No evidence of intra or extra hepatic biliary ductal dilation.    - Spleen: Negative.    - Kidneys: No mass or hydronephrosis.    - Adrenals: Unremarkable.    - Pancreas: No mass or peripancreatic fat stranding.    - Retroperitoneum:  No significant adenopathy.    - Vascular: No abdominal aortic aneurysm.    - Abdominal wall:  There is a right-sided lateral ventral hernia with a neck measuring 66 mm containing loops of bowel.    Pelvis:    No pelvic mass, adenopathy, or free fluid.    Bowel/Mesentery:    Loops of colon herniates through a right lateral ventral hernia.  There is inflammation about the herniated bowel.  Small amount of fluid within the inferior aspect of the hernia sac as well.  No definite bowel obstruction.  No pneumatosis intestinalis.  Sigmoid colon diverticulosis.    Bones:  No acute osseous abnormality and no suspicious lytic or blastic lesion.  Multilevel degenerative  osteophytosis.

## 2018-10-21 NOTE — SUBJECTIVE & OBJECTIVE
Interval History: Patient continue to have abdominal pain, nausea and vomiting. General surgery consult. Plan for surgery tomorrow.     Review of Systems   Constitutional: Positive for fatigue. Negative for chills, diaphoresis and fever.   HENT: Negative for congestion, sore throat and voice change.    Eyes: Negative for photophobia and visual disturbance.   Respiratory: Negative for cough, shortness of breath, wheezing and stridor.    Cardiovascular: Negative for chest pain and leg swelling.   Gastrointestinal: Positive for abdominal pain, nausea and vomiting. Negative for abdominal distention, constipation and diarrhea.   Endocrine: Negative for polydipsia, polyphagia and polyuria.   Genitourinary: Negative for difficulty urinating, dysuria, flank pain, pelvic pain, urgency and vaginal discharge.   Musculoskeletal: Negative for back pain, joint swelling, neck pain and neck stiffness.   Skin: Negative for color change and rash.   Allergic/Immunologic: Negative for immunocompromised state.   Neurological: Negative for dizziness, syncope, weakness, numbness and headaches.   Hematological: Does not bruise/bleed easily.   Psychiatric/Behavioral: Negative for agitation, behavioral problems and confusion.     Objective:     Vital Signs (Most Recent):  Temp: 99.7 °F (37.6 °C) (10/21/18 0818)  Pulse: 81 (10/21/18 0818)  Resp: 17 (10/21/18 0818)  BP: 131/63 (10/21/18 0818)  SpO2: 98 % (10/21/18 0818) Vital Signs (24h Range):  Temp:  [98.2 °F (36.8 °C)-99.7 °F (37.6 °C)] 99.7 °F (37.6 °C)  Pulse:  [72-85] 81  Resp:  [17-27] 17  SpO2:  [93 %-99 %] 98 %  BP: (131-229)/(63-96) 131/63     Weight: 93.4 kg (205 lb 14.6 oz)  Body mass index is 37.06 kg/m².    Intake/Output Summary (Last 24 hours) at 10/21/2018 1115  Last data filed at 10/21/2018 0800  Gross per 24 hour   Intake 0 ml   Output --   Net 0 ml      Physical Exam   Constitutional: She is oriented to person, place, and time. She appears well-developed and well-nourished.  No distress.   HENT:   Head: Normocephalic and atraumatic.   Nose: Nose normal.   Eyes: Conjunctivae and EOM are normal. Pupils are equal, round, and reactive to light. No scleral icterus.   Neck: Normal range of motion. Neck supple. No tracheal deviation present.   Cardiovascular: Normal rate, regular rhythm, normal heart sounds and intact distal pulses.   No murmur heard.  Pulmonary/Chest: Effort normal and breath sounds normal. No stridor. No respiratory distress. She has no wheezes. She has no rales.   Abdominal: Soft. She exhibits no distension. There is tenderness in the right upper quadrant. There is no guarding.   Genitourinary:   Genitourinary Comments: deferred   Musculoskeletal: Normal range of motion. She exhibits no edema or deformity.   Neurological: She is alert and oriented to person, place, and time. No cranial nerve deficit.   Skin: Skin is warm and dry. Capillary refill takes less than 2 seconds. No rash noted. She is not diaphoretic.   Psychiatric: She has a normal mood and affect. Her behavior is normal. Judgment and thought content normal.   Nursing note and vitals reviewed.      Significant Labs:   BMP:   Recent Labs   Lab 10/21/18  0511         K 3.6      CO2 25   BUN 8   CREATININE 0.7   CALCIUM 9.0   MG 2.3     CBC:   Recent Labs   Lab 10/20/18  1843 10/21/18  0511   WBC 8.88 7.99   HGB 14.4 13.8   HCT 44.1 42.5    188     All pertinent labs within the past 24 hours have been reviewed.    Significant Imaging:   Imaging Results          CT Abdomen Pelvis With Contrast (Final result)  Result time 10/20/18 21:36:04    Final result by Daniel Robbins Jr., MD (10/20/18 21:36:04)                 Impression:      1. Right sided spigelian hernia containing a loop of ascending colon.  There is inflammation about the herniated bowel loop.  Please correlate clinically for incarcerated hernia.  No definite bowel obstruction related to this process.  2. Sigmoid colon  diverticulosis.  3. Prior cholecystectomy.      Electronically signed by: Daniel Robbins Jr., MD  Date:    10/20/2018  Time:    21:36             Narrative:    EXAMINATION:  CT ABDOMEN PELVIS WITH CONTRAST    CLINICAL HISTORY:  abdominal pain;    TECHNIQUE:  Low dose axial images, sagittal and coronal reformations were obtained from the lung bases to the pubic symphysis following the IV administration of 75 mL of Omnipaque 350 and the oral administration of 30 mL of Omnipaque 350. All CT scans at this facility use dose modulation, iterative reconstruction and/or weight based dosing when appropriate to reduce radiation dose to as low as reasonably achievable.    COMPARISON:  None.    FINDINGS:  Abdomen:    - Lower thorax:Heart size is normal.    - Lung bases: No infiltrates and no nodules.    - Liver: No focal mass.    - Gallbladder: Prior cholecystectomy.    - Bile Ducts: No evidence of intra or extra hepatic biliary ductal dilation.    - Spleen: Negative.    - Kidneys: No mass or hydronephrosis.    - Adrenals: Unremarkable.    - Pancreas: No mass or peripancreatic fat stranding.    - Retroperitoneum:  No significant adenopathy.    - Vascular: No abdominal aortic aneurysm.    - Abdominal wall:  There is a right-sided lateral ventral hernia with a neck measuring 66 mm containing loops of bowel.    Pelvis:    No pelvic mass, adenopathy, or free fluid.    Bowel/Mesentery:    Loops of colon herniates through a right lateral ventral hernia.  There is inflammation about the herniated bowel.  Small amount of fluid within the inferior aspect of the hernia sac as well.  No definite bowel obstruction.  No pneumatosis intestinalis.  Sigmoid colon diverticulosis.    Bones:  No acute osseous abnormality and no suspicious lytic or blastic lesion.  Multilevel degenerative osteophytosis.

## 2018-10-21 NOTE — ED NOTES
CT report received and pt requesting [facility] for surgical follow-up. MD at bedside discussing results and follow-up with pt and family.

## 2018-10-21 NOTE — HPI
Yaneth Fabian is a 62 y.o. female presenting as transfer from [facility] for surgical evaluation of findings concerning incarcerated hernia.  Patient reports that she has had intermittent episodes of periumbilical abdominal pain over the past couple days worsening today. It is worsened when she stands up straight.  Better when she remains in a fetal position or vomitis.  It is associated with nausea and vomiting and loose stools. Prior surgeries include appendectomy and hernia surgery.  Prior treatment includes taking Prilosec, Pepto-Bismol, and aspirin 81 mg with no improvement in symptoms per patient. Patient evaluated in ER. Labs stable, Lactic negative. CT ABD/Pelvis .1. Right sided spigelian hernia containing a loop of ascending colon.  There is inflammation about the herniated bowel loop.  Please correlate clinically for incarcerated hernia.  No definite bowel obstruction related to this process.2. Sigmoid colon diverticulosis.3. Prior cholecystectomy. General Surgery was consulted by ER. HM and General Surgery discussed case, HM will admit patient.

## 2018-10-21 NOTE — PLAN OF CARE
Problem: Patient Care Overview  Goal: Plan of Care Review  Outcome: Ongoing (interventions implemented as appropriate)  Reviewed POC,  Including indications and possible side effects of administer medications. Pt. Verbalized understanding and teach back.  Remain free from injury. Bed low and lock, SRx2 up and call bell within reach.Patient alert and oriented x4, very cooperative, grandaughter is bedside, ambulates independent to bathroom , respirations even and unlabored will continue to monitor.    Chart Check complete.

## 2018-10-21 NOTE — HOSPITAL COURSE
Ms Guerin is a 62 year old female who presented [facility] with complaints of abdominal pain. She was experiencing intermittent episodes of periumbilical abdominal pain. Associated symptoms include nausea, vomiting and loose stools.   CT abdomen/pelvis showed right sided spigelian hernia containing a loop of ascending colon with inflammation about the herniated bowel loop. No definite bowel obstruction related to this process. Sigmoid colon diverticulosis. Case was reviewed with general surgery, patient underwent robotic incisional hernia repair by Dr Goddard on yesterday. She is feeling well today, vital signs and labs stable, REBECCA drain and dressing intact. Case reviewed with CARISSA Cherry. Ok to discharge today from general surgery standpoint, she has sent antibiotic and pain medication to pharmacy. Scheduled to follow up in clinic with Dr Goddard in one week. Patient was seen, examined and deemed suitable for discharge.

## 2018-10-21 NOTE — PROGRESS NOTES
[facility] Hospital Medicine  Progress Note    Patient Name: Yaneth Fabian  MRN: [id number]  Patient Class: IP- Inpatient   Admission Date: 10/20/2018  Length of Stay: 1 days  Attending Physician: Rahul Aguirre MD  Primary Care Provider: Vahe Hawkins MD        Subjective:     Principal Problem:Spigelian hernia    HPI:  Yaneth Fabian is a 62 y.o. female presenting  as transfer from [facility] for surgical evaluation of findings concerning incarcerated hernia.  Patient reports that she has had intermittent episodes of periumbilical abdominal pain over the past couple days worsening today. It is worsened when she stands up straight.  Better when she remains in a fetal position or vomitis.  It is associated with nausea and vomiting and loose stools. Prior surgeries include appendectomy and hernia surgery.  Prior treatment includes taking Prilosec, Pepto-Bismol, and aspirin 81 mg with no improvement in symptoms per patient. Patient evaluated in ER. Labs stable, Lactic negative. CT ABD/Pelvis .1. Right sided spigelian hernia containing a loop of ascending colon.  There is inflammation about the herniated bowel loop.  Please correlate clinically for incarcerated hernia.  No definite bowel obstruction related to this process.2. Sigmoid colon diverticulosis.3. Prior cholecystectomy. General Surgery was consulted by ER.  and General Surgery discussed case,  will admit patient.         Hospital Course:  Patient admitted for spigelian hernia. CT abdomen/pelvis showed right sided spigelian hernia containing a loop of ascending colon.  There is inflammation about the herniated bowel loop.  Please correlate clinically for incarcerated hernia.  No definite bowel obstruction related to this process. Sigmoid colon diverticulosis. General surgery consult. Patient NPO. Analgesics and antiemetics prn. Plan for hernia repair surgery tomorrow per Dr. Goddard.     Interval History: Patient continue to have  abdominal pain, nausea and vomiting. General surgery consult. Plan for surgery tomorrow.     Review of Systems   Constitutional: Positive for fatigue. Negative for chills, diaphoresis and fever.   HENT: Negative for congestion, sore throat and voice change.    Eyes: Negative for photophobia and visual disturbance.   Respiratory: Negative for cough, shortness of breath, wheezing and stridor.    Cardiovascular: Negative for chest pain and leg swelling.   Gastrointestinal: Positive for abdominal pain, nausea and vomiting. Negative for abdominal distention, constipation and diarrhea.   Endocrine: Negative for polydipsia, polyphagia and polyuria.   Genitourinary: Negative for difficulty urinating, dysuria, flank pain, pelvic pain, urgency and vaginal discharge.   Musculoskeletal: Negative for back pain, joint swelling, neck pain and neck stiffness.   Skin: Negative for color change and rash.   Allergic/Immunologic: Negative for immunocompromised state.   Neurological: Negative for dizziness, syncope, weakness, numbness and headaches.   Hematological: Does not bruise/bleed easily.   Psychiatric/Behavioral: Negative for agitation, behavioral problems and confusion.     Objective:     Vital Signs (Most Recent):  Temp: 99.7 °F (37.6 °C) (10/21/18 0818)  Pulse: 81 (10/21/18 0818)  Resp: 17 (10/21/18 0818)  BP: 131/63 (10/21/18 0818)  SpO2: 98 % (10/21/18 0818) Vital Signs (24h Range):  Temp:  [98.2 °F (36.8 °C)-99.7 °F (37.6 °C)] 99.7 °F (37.6 °C)  Pulse:  [72-85] 81  Resp:  [17-27] 17  SpO2:  [93 %-99 %] 98 %  BP: (131-229)/(63-96) 131/63     Weight: 93.4 kg (205 lb 14.6 oz)  Body mass index is 37.06 kg/m².    Intake/Output Summary (Last 24 hours) at 10/21/2018 1115  Last data filed at 10/21/2018 0800  Gross per 24 hour   Intake 0 ml   Output --   Net 0 ml      Physical Exam   Constitutional: She is oriented to person, place, and time. She appears well-developed and well-nourished. No distress.   HENT:   Head: Normocephalic  and atraumatic.   Nose: Nose normal.   Eyes: Conjunctivae and EOM are normal. Pupils are equal, round, and reactive to light. No scleral icterus.   Neck: Normal range of motion. Neck supple. No tracheal deviation present.   Cardiovascular: Normal rate, regular rhythm, normal heart sounds and intact distal pulses.   No murmur heard.  Pulmonary/Chest: Effort normal and breath sounds normal. No stridor. No respiratory distress. She has no wheezes. She has no rales.   Abdominal: Soft. She exhibits no distension. There is tenderness in the right upper quadrant. There is no guarding.   Genitourinary:   Genitourinary Comments: deferred   Musculoskeletal: Normal range of motion. She exhibits no edema or deformity.   Neurological: She is alert and oriented to person, place, and time. No cranial nerve deficit.   Skin: Skin is warm and dry. Capillary refill takes less than 2 seconds. No rash noted. She is not diaphoretic.   Psychiatric: She has a normal mood and affect. Her behavior is normal. Judgment and thought content normal.   Nursing note and vitals reviewed.      Significant Labs:   BMP:   Recent Labs   Lab 10/21/18  0511         K 3.6      CO2 25   BUN 8   CREATININE 0.7   CALCIUM 9.0   MG 2.3     CBC:   Recent Labs   Lab 10/20/18  1843 10/21/18  0511   WBC 8.88 7.99   HGB 14.4 13.8   HCT 44.1 42.5    188     All pertinent labs within the past 24 hours have been reviewed.    Significant Imaging:   Imaging Results          CT Abdomen Pelvis With Contrast (Final result)  Result time 10/20/18 21:36:04    Final result by Daniel Robbins Jr., MD (10/20/18 21:36:04)                 Impression:      1. Right sided spigelian hernia containing a loop of ascending colon.  There is inflammation about the herniated bowel loop.  Please correlate clinically for incarcerated hernia.  No definite bowel obstruction related to this process.  2. Sigmoid colon diverticulosis.  3. Prior  cholecystectomy.      Electronically signed by: Daniel Robbins Jr., MD  Date:    10/20/2018  Time:    21:36             Narrative:    EXAMINATION:  CT ABDOMEN PELVIS WITH CONTRAST    CLINICAL HISTORY:  abdominal pain;    TECHNIQUE:  Low dose axial images, sagittal and coronal reformations were obtained from the lung bases to the pubic symphysis following the IV administration of 75 mL of Omnipaque 350 and the oral administration of 30 mL of Omnipaque 350. All CT scans at this facility use dose modulation, iterative reconstruction and/or weight based dosing when appropriate to reduce radiation dose to as low as reasonably achievable.    COMPARISON:  None.    FINDINGS:  Abdomen:    - Lower thorax:Heart size is normal.    - Lung bases: No infiltrates and no nodules.    - Liver: No focal mass.    - Gallbladder: Prior cholecystectomy.    - Bile Ducts: No evidence of intra or extra hepatic biliary ductal dilation.    - Spleen: Negative.    - Kidneys: No mass or hydronephrosis.    - Adrenals: Unremarkable.    - Pancreas: No mass or peripancreatic fat stranding.    - Retroperitoneum:  No significant adenopathy.    - Vascular: No abdominal aortic aneurysm.    - Abdominal wall:  There is a right-sided lateral ventral hernia with a neck measuring 66 mm containing loops of bowel.    Pelvis:    No pelvic mass, adenopathy, or free fluid.    Bowel/Mesentery:    Loops of colon herniates through a right lateral ventral hernia.  There is inflammation about the herniated bowel.  Small amount of fluid within the inferior aspect of the hernia sac as well.  No definite bowel obstruction.  No pneumatosis intestinalis.  Sigmoid colon diverticulosis.    Bones:  No acute osseous abnormality and no suspicious lytic or blastic lesion.  Multilevel degenerative osteophytosis.                              Assessment/Plan:      * Spigelian hernia    General surgery aware of patient and managing.   Monitor, control pain   NPO  Plan for hernia repair  surgery tomorrow   Analgesics and antiemetics prn      Obesity (BMI 35.0-39.9 without comorbidity)    Encourage diet, exercise, weight loss       Hyperlipemia    Lipid panel normal   Ha1c 5.8  Pt. NPO, will resume when appropriate      Essential hypertension, benign    Pt. NPO  IV meds as need  Monitor trends         VTE Risk Mitigation (From admission, onward)        Ordered     IP VTE HIGH RISK PATIENT  Once      10/21/18 0122     Place STEPHEN hose  Until discontinued      10/21/18 0122     Place sequential compression device  Until discontinued      10/21/18 0122     Reason for No Pharmacological VTE Prophylaxis  Once      10/21/18 0122              Baylee Domingo NP  Department of Hospital Medicine   [facility]

## 2018-10-21 NOTE — ASSESSMENT & PLAN NOTE
General surgery aware of patient and managing.   Monitor, control pain   NPO  Plan for hernia repair surgery tomorrow   Analgesics and antiemetics prn

## 2018-10-21 NOTE — H&P
[facility] Hospital Medicine  History & Physical    Patient Name: Yaneth Fabian  MRN: [id number]  Admission Date: 10/20/2018  Attending Physician: Rahul Aguirre MD   Primary Care Provider: Vahe Hawkins MD         Patient information was obtained from patient, past medical records and ER records.     Subjective:     Principal Problem:Spigelian hernia    Chief Complaint:   Chief Complaint   Patient presents with    Abdominal Pain     off and since last week with N/V/D worst today        HPI: Yaneth Fabian is a 62 y.o. female presenting  as transfer from [facility] for surgical evaluation of findings concerning incarcerated hernia.  Patient reports that she has had intermittent episodes of periumbilical abdominal pain over the past couple days worsening today. It is worsened when she stands up straight.  Better when she remains in a fetal position or vomitis.  It is associated with nausea and vomiting and loose stools. Prior surgeries include appendectomy and hernia surgery.  Prior treatment includes taking Prilosec, Pepto-Bismol, and aspirin 81 mg with no improvement in symptoms per patient. Patient evaluated in ER. Labs stable, Lactic negative. CT ABD/Pelvis .1. Right sided spigelian hernia containing a loop of ascending colon.  There is inflammation about the herniated bowel loop.  Please correlate clinically for incarcerated hernia.  No definite bowel obstruction related to this process.2. Sigmoid colon diverticulosis.3. Prior cholecystectomy. General Surgery was consulted by ER. HM and General Surgery discussed case, General Surgery request HM admit patient.       Past Medical History:   Diagnosis Date    Arthritis     Asthma     DVT (deep venous thrombosis)     left popliteal    GERD (gastroesophageal reflux disease)     Hyperlipidemia     Hypertension     Insomnia 6/13/2013    Knee pain     Obesity (BMI 35.0-39.9 without comorbidity) 2/20/2017       Past Surgical History:    Procedure Laterality Date    APPENDECTOMY      BLADDER REPAIR      HERNIA REPAIR      JOINT REPLACEMENT      KNEE SURGERY      right    TUBAL LIGATION      TYMPANIC MEMBRANE REPAIR         Review of patient's allergies indicates:  No Known Allergies    No current facility-administered medications on file prior to encounter.      Current Outpatient Medications on File Prior to Encounter   Medication Sig    ALBUTEROL INHL Inhale into the lungs.    benazepril (LOTENSIN) 10 MG tablet Take 10 mg by mouth once daily.    furosemide (LASIX) 20 MG tablet Take 1 tablet (20 mg total) by mouth once daily.    meloxicam (MOBIC) 15 MG tablet TAKE 1 TABLET DAILY BY MOUTH WITH FOOD    montelukast (SINGULAIR) 10 mg tablet Take 10 mg by mouth every evening.    nabumetone (RELAFEN) 500 MG tablet Take 500 mg by mouth once daily.    pantoprazole (PROTONIX) 40 MG tablet Take 40 mg by mouth once daily.     potassium chloride (KLOR-CON) 10 MEQ TbSR Take 1 tablet (10 mEq total) by mouth once daily.    predniSONE (DELTASONE) 20 MG tablet Take 1 tablet 3 times per day for 3 days, then Take 1 tablet 2 times per day for 3 days, then Take 1 tablet daily for 3 days    zolpidem (AMBIEN) 5 MG Tab Take 1 tablet (5 mg total) by mouth nightly as needed.     Family History     Problem Relation (Age of Onset)    Diabetes Mother    Heart disease Mother    Hypertension Mother    No Known Problems Father        Tobacco Use    Smoking status: Never Smoker    Smokeless tobacco: Never Used   Substance and Sexual Activity    Alcohol use: No    Drug use: No    Sexual activity: Yes     Partners: Male     Comment: mut monog     *I have personally reviewed the patients allergies, medical, surgical, family and social history as listed above.       Review of Systems   Constitutional: Positive for fatigue. Negative for chills, diaphoresis and fever.   HENT: Negative for congestion, sore throat and voice change.    Eyes: Negative for photophobia  and visual disturbance.   Respiratory: Negative for cough, shortness of breath, wheezing and stridor.    Cardiovascular: Negative for chest pain and leg swelling.   Gastrointestinal: Positive for abdominal pain, nausea and vomiting. Negative for abdominal distention, constipation and diarrhea.   Endocrine: Negative for polydipsia, polyphagia and polyuria.   Genitourinary: Negative for difficulty urinating, dysuria, flank pain, pelvic pain, urgency and vaginal discharge.   Musculoskeletal: Negative for back pain, joint swelling, neck pain and neck stiffness.   Skin: Negative for color change and rash.   Allergic/Immunologic: Negative for immunocompromised state.   Neurological: Negative for dizziness, syncope, weakness, numbness and headaches.   Hematological: Does not bruise/bleed easily.   Psychiatric/Behavioral: Negative for agitation, behavioral problems and confusion.     Objective:     Vital Signs (Most Recent):  Temp: 98.2 °F (36.8 °C) (10/21/18 0328)  Pulse: 75 (10/21/18 0328)  Resp: 20 (10/21/18 0328)  BP: (!) 173/72(reported elevated bp to denia) (10/21/18 0328)  SpO2: 97 % (10/21/18 0328) Vital Signs (24h Range):  Temp:  [98.2 °F (36.8 °C)-99.1 °F (37.3 °C)] 98.2 °F (36.8 °C)  Pulse:  [72-85] 75  Resp:  [17-27] 20  SpO2:  [93 %-99 %] 97 %  BP: (145-229)/(65-96) 173/72     Weight: 93.4 kg (205 lb 14.6 oz)  Body mass index is 37.06 kg/m².    Physical Exam   Constitutional: She is oriented to person, place, and time. She appears well-developed and well-nourished. No distress.   HENT:   Head: Normocephalic and atraumatic.   Nose: Nose normal.   Eyes: Conjunctivae and EOM are normal. Pupils are equal, round, and reactive to light. No scleral icterus.   Neck: Normal range of motion. Neck supple. No tracheal deviation present.   Cardiovascular: Normal rate, regular rhythm, normal heart sounds and intact distal pulses.   No murmur heard.  Pulmonary/Chest: Effort normal and breath sounds normal. No stridor. No  respiratory distress. She has no wheezes. She has no rales.   Abdominal: Soft. She exhibits no distension. There is tenderness. There is no guarding.   obese   Genitourinary:   Genitourinary Comments: deferred   Musculoskeletal: Normal range of motion. She exhibits no edema or deformity.   Neurological: She is alert and oriented to person, place, and time. No cranial nerve deficit.   Skin: Skin is warm and dry. Capillary refill takes less than 2 seconds. No rash noted. She is not diaphoretic.   Psychiatric: She has a normal mood and affect. Her behavior is normal. Judgment and thought content normal.   Nursing note and vitals reviewed.        CRANIAL NERVES     CN III, IV, VI   Pupils are equal, round, and reactive to light.  Extraocular motions are normal.        Significant Labs: All pertinent labs within the past 24 hours have been reviewed.  Results for orders placed or performed during the hospital encounter of 10/20/18   CBC auto differential   Result Value Ref Range    WBC 8.88 3.90 - 12.70 K/uL    RBC 4.75 4.00 - 5.40 M/uL    Hemoglobin 14.4 12.0 - 16.0 g/dL    Hematocrit 44.1 37.0 - 48.5 %    MCV 93 82 - 98 fL    MCH 30.3 27.0 - 31.0 pg    MCHC 32.7 32.0 - 36.0 g/dL    RDW 13.0 11.5 - 14.5 %    Platelets 190 150 - 350 K/uL    MPV 11.4 9.2 - 12.9 fL    Gran # (ANC) 6.1 1.8 - 7.7 K/uL    Lymph # 2.0 1.0 - 4.8 K/uL    Mono # 0.7 0.3 - 1.0 K/uL    Eos # 0.1 0.0 - 0.5 K/uL    Baso # 0.02 0.00 - 0.20 K/uL    Gran% 68.5 38.0 - 73.0 %    Lymph% 23.0 18.0 - 48.0 %    Mono% 7.5 4.0 - 15.0 %    Eosinophil% 0.7 0.0 - 8.0 %    Basophil% 0.2 0.0 - 1.9 %    Differential Method Automated    Comprehensive metabolic panel   Result Value Ref Range    Sodium 140 136 - 145 mmol/L    Potassium 4.0 3.5 - 5.1 mmol/L    Chloride 105 95 - 110 mmol/L    CO2 25 23 - 29 mmol/L    Glucose 130 (H) 70 - 110 mg/dL    BUN, Bld 12 6 - 20 mg/dL    Creatinine 0.8 0.5 - 1.4 mg/dL    Calcium 9.5 8.7 - 10.5 mg/dL    Total Protein 7.6 6.0 - 8.4  g/dL    Albumin 3.8 3.5 - 5.2 g/dL    Total Bilirubin 0.5 0.1 - 1.0 mg/dL    Alkaline Phosphatase 89 55 - 135 U/L    AST 25 10 - 40 U/L    ALT 19 10 - 44 U/L    Anion Gap 10 8 - 16 mmol/L    eGFR if African American >60.0 >60 mL/min/1.73 m^2    eGFR if non African American >60.0 >60 mL/min/1.73 m^2   Lipase   Result Value Ref Range    Lipase 18 4 - 60 U/L   Troponin I   Result Value Ref Range    Troponin I <0.006 0.000 - 0.026 ng/mL   Urinalysis   Result Value Ref Range    Specimen UA Urine, Clean Catch     Color, UA Yellow Yellow, Straw, [place]    Appearance, UA Clear Clear    pH, UA 6.0 5.0 - 8.0    Specific Gravity, UA 1.015 1.005 - 1.030    Protein, UA Negative Negative    Glucose, UA Negative Negative    Ketones, UA Negative Negative    Bilirubin (UA) Negative Negative    Occult Blood UA Negative Negative    Nitrite, UA Negative Negative    Urobilinogen, UA Negative <2.0 EU/dL    Leukocytes, UA Negative Negative   APTT   Result Value Ref Range    aPTT 27.3 21.0 - 32.0 sec   Protime-INR   Result Value Ref Range    Prothrombin Time 10.1 9.0 - 12.5 sec    INR 1.0 0.8 - 1.2   Lactic acid, plasma   Result Value Ref Range    Lactate (Lactic Acid) 1.0 0.5 - 2.2 mmol/L   C-reactive protein   Result Value Ref Range    CRP 5.5 0.0 - 8.2 mg/L   Procalcitonin   Result Value Ref Range    Procalcitonin 0.03 <0.25 ng/mL       Significant Imaging: I have reviewed all pertinent imaging results/findings within the past 24 hours.   Imaging Results          CT Abdomen Pelvis With Contrast (Final result)  Result time 10/20/18 21:36:04    Final result by Daniel Robbins Jr., MD (10/20/18 21:36:04)                 Impression:      1. Right sided spigelian hernia containing a loop of ascending colon.  There is inflammation about the herniated bowel loop.  Please correlate clinically for incarcerated hernia.  No definite bowel obstruction related to this process.  2. Sigmoid colon diverticulosis.  3. Prior  cholecystectomy.      Electronically signed by: Daniel Robbins Jr., MD  Date:    10/20/2018  Time:    21:36             Narrative:    EXAMINATION:  CT ABDOMEN PELVIS WITH CONTRAST    CLINICAL HISTORY:  abdominal pain;    TECHNIQUE:  Low dose axial images, sagittal and coronal reformations were obtained from the lung bases to the pubic symphysis following the IV administration of 75 mL of Omnipaque 350 and the oral administration of 30 mL of Omnipaque 350. All CT scans at this facility use dose modulation, iterative reconstruction and/or weight based dosing when appropriate to reduce radiation dose to as low as reasonably achievable.    COMPARISON:  None.    FINDINGS:  Abdomen:    - Lower thorax:Heart size is normal.    - Lung bases: No infiltrates and no nodules.    - Liver: No focal mass.    - Gallbladder: Prior cholecystectomy.    - Bile Ducts: No evidence of intra or extra hepatic biliary ductal dilation.    - Spleen: Negative.    - Kidneys: No mass or hydronephrosis.    - Adrenals: Unremarkable.    - Pancreas: No mass or peripancreatic fat stranding.    - Retroperitoneum:  No significant adenopathy.    - Vascular: No abdominal aortic aneurysm.    - Abdominal wall:  There is a right-sided lateral ventral hernia with a neck measuring 66 mm containing loops of bowel.    Pelvis:    No pelvic mass, adenopathy, or free fluid.    Bowel/Mesentery:    Loops of colon herniates through a right lateral ventral hernia.  There is inflammation about the herniated bowel.  Small amount of fluid within the inferior aspect of the hernia sac as well.  No definite bowel obstruction.  No pneumatosis intestinalis.  Sigmoid colon diverticulosis.    Bones:  No acute osseous abnormality and no suspicious lytic or blastic lesion.  Multilevel degenerative osteophytosis.                              I have personally reviewed the patients labs, imaging,     Assessment/Plan:     * [facility]    General surgery aware of patient and  managing.   Monitor, control pain   NPO     Obesity (BMI 35.0-39.9 without comorbidity)    Encourage diet, exercise, weight loss       Hyperlipemia    Check lipid, a1c  Orals on hold        Essential hypertension, benign    Oral meds on hold  IV meds as need  Monitor trends         VTE Risk Mitigation (From admission, onward)        Ordered     IP VTE HIGH RISK PATIENT  Once      10/21/18 0122     Place STEPHEN hose  Until discontinued      10/21/18 0122     Place sequential compression device  Until discontinued      10/21/18 0122     Reason for No Pharmacological VTE Prophylaxis  Once      10/21/18 0122             Melvin Juárez NP  Department of Hospital Medicine   [facility]

## 2018-10-21 NOTE — PLAN OF CARE
CM met with patient at the bedside to assess for discharge needs.  Patient lives at home with her  and is independent with all needs. She does not anticipate any discharge needs at this time.  Her PCP is Dr Hawkins and her preferred pharmacy is [facility] [place].  CM provided a transitional care folder, information on advanced directives, and discharge planning begins on admission with contact information for any needs/questions.      D/C Plan:  Home with no needs        [id number] - [place], [place] - [address] [address]  [address]  [place] [address]  Phone: [phone] Fax: [phone]    [facility] - [place], [place] - [address]  [address]  [place] [address]  Phone: [phone] Fax: [phone]    Vahe Hawkins MD  Payor: [employer] / Plan: [employer] / Product Type: Commercial /       10/21/18 1044   Discharge Assessment   Assessment Type Discharge Planning Assessment   Confirmed/corrected address and phone number on facesheet? Yes   Assessment information obtained from? Patient;Medical Record   Expected Length of Stay (days) (TBD)   Communicated expected length of stay with patient/caregiver yes   Prior to hospitilization cognitive status: Alert/Oriented   Prior to hospitalization functional status: Independent   Current cognitive status: Alert/Oriented   Current Functional Status: Independent   Facility Arrived From: home   Lives With spouse   Able to Return to Prior Arrangements yes   Is patient able to care for self after discharge? Yes   Who are your caregiver(s) and their phone number(s)? Patient is independent.  Melvin Fabian, spouse [phone]   Patient's perception of discharge disposition home or selfcare   Readmission Within The Last 30 Days no previous admission in last 30 days   Patient currently being followed by outpatient case management? No   Patient currently receives any other outside  agency services? No   Equipment Currently Used at Home none   Do you have any problems affording any of your prescribed medications? No   Is the patient taking medications as prescribed? yes   Does the patient have transportation home? Yes   Transportation Available car;family or friend will provide   Dialysis Name and Scheduled days NA   Does the patient receive services at the [facility]? No   Discharge Plan A Home with family   Discharge Plan B Home with family   Patient/Family In Agreement With Plan yes

## 2018-10-21 NOTE — SIGNIFICANT EVENT
Discussed with Dr. Aguirre regarding case. Dr. Aguirre states he spoke with Dr. Goddard . Dr. Aguirre notes to admit to  under his care.

## 2018-10-21 NOTE — SIGNIFICANT EVENT
Dr. Goddard called in regards to patient. Dr. Pisano and Dr. Goddard discussed patients case, Dr. Pisano stressed concern of patients CT with indications of Incarceration of bowel which requires surgical management. Dr. Pisano offers Hospital Medicine consultation to see patient on arrival to assist with medical management. Dr. Goddard states his preference is patient be admitted to .

## 2018-10-22 ENCOUNTER — ANESTHESIA (OUTPATIENT)
Dept: SURGERY | Facility: HOSPITAL | Age: 63
DRG: 355 | End: 2018-10-22
Payer: OTHER GOVERNMENT

## 2018-10-22 ENCOUNTER — ANESTHESIA EVENT (OUTPATIENT)
Dept: SURGERY | Facility: HOSPITAL | Age: 63
DRG: 355 | End: 2018-10-22
Payer: OTHER GOVERNMENT

## 2018-10-22 LAB
ANION GAP SERPL CALC-SCNC: 9 MMOL/L
BASOPHILS # BLD AUTO: 0.02 K/UL
BASOPHILS NFR BLD: 0.2 %
BUN SERPL-MCNC: 8 MG/DL
CALCIUM SERPL-MCNC: 9 MG/DL
CHLORIDE SERPL-SCNC: 105 MMOL/L
CO2 SERPL-SCNC: 24 MMOL/L
CREAT SERPL-MCNC: 0.7 MG/DL
DIFFERENTIAL METHOD: ABNORMAL
EOSINOPHIL # BLD AUTO: 0.1 K/UL
EOSINOPHIL NFR BLD: 0.9 %
ERYTHROCYTE [DISTWIDTH] IN BLOOD BY AUTOMATED COUNT: 13.5 %
EST. GFR  (AFRICAN AMERICAN): >60 ML/MIN/1.73 M^2
EST. GFR  (NON AFRICAN AMERICAN): >60 ML/MIN/1.73 M^2
GLUCOSE SERPL-MCNC: 125 MG/DL
HCT VFR BLD AUTO: 42.8 %
HGB BLD-MCNC: 13.9 G/DL
LYMPHOCYTES # BLD AUTO: 1.5 K/UL
LYMPHOCYTES NFR BLD: 16.2 %
MCH RBC QN AUTO: 31 PG
MCHC RBC AUTO-ENTMCNC: 32.5 G/DL
MCV RBC AUTO: 96 FL
MONOCYTES # BLD AUTO: 0.7 K/UL
MONOCYTES NFR BLD: 7.9 %
NEUTROPHILS # BLD AUTO: 6.8 K/UL
NEUTROPHILS NFR BLD: 74.8 %
PLATELET # BLD AUTO: 199 K/UL
PMV BLD AUTO: 11.2 FL
POTASSIUM SERPL-SCNC: 4.2 MMOL/L
RBC # BLD AUTO: 4.48 M/UL
SODIUM SERPL-SCNC: 138 MMOL/L
WBC # BLD AUTO: 9.04 K/UL

## 2018-10-22 PROCEDURE — 25000003 PHARM REV CODE 250: Performed by: SURGERY

## 2018-10-22 PROCEDURE — 27000221 HC OXYGEN, UP TO 24 HOURS

## 2018-10-22 PROCEDURE — 36000710: Performed by: SURGERY

## 2018-10-22 PROCEDURE — 63600175 PHARM REV CODE 636 W HCPCS: Performed by: EMERGENCY MEDICINE

## 2018-10-22 PROCEDURE — 37000009 HC ANESTHESIA EA ADD 15 MINS: Performed by: SURGERY

## 2018-10-22 PROCEDURE — 88302 TISSUE EXAM BY PATHOLOGIST: CPT | Mod: 26,,, | Performed by: PATHOLOGY

## 2018-10-22 PROCEDURE — 80048 BASIC METABOLIC PNL TOTAL CA: CPT

## 2018-10-22 PROCEDURE — 49655 PR LAP, INCISIONAL HERNIA REPAIR,INCARCERATED: CPT | Mod: ,,, | Performed by: SURGERY

## 2018-10-22 PROCEDURE — 71000039 HC RECOVERY, EACH ADD'L HOUR: Performed by: SURGERY

## 2018-10-22 PROCEDURE — 63600175 PHARM REV CODE 636 W HCPCS: Performed by: NURSE PRACTITIONER

## 2018-10-22 PROCEDURE — 37000008 HC ANESTHESIA 1ST 15 MINUTES: Performed by: SURGERY

## 2018-10-22 PROCEDURE — 94760 N-INVAS EAR/PLS OXIMETRY 1: CPT

## 2018-10-22 PROCEDURE — 36415 COLL VENOUS BLD VENIPUNCTURE: CPT

## 2018-10-22 PROCEDURE — 25000003 PHARM REV CODE 250: Performed by: ANESTHESIOLOGY

## 2018-10-22 PROCEDURE — 63600175 PHARM REV CODE 636 W HCPCS: Performed by: SURGERY

## 2018-10-22 PROCEDURE — C1781 MESH (IMPLANTABLE): HCPCS | Performed by: SURGERY

## 2018-10-22 PROCEDURE — 63600175 PHARM REV CODE 636 W HCPCS: Performed by: ANESTHESIOLOGY

## 2018-10-22 PROCEDURE — 36000711: Performed by: SURGERY

## 2018-10-22 PROCEDURE — 99900035 HC TECH TIME PER 15 MIN (STAT)

## 2018-10-22 PROCEDURE — 0WUF0JZ SUPPLEMENT ABDOMINAL WALL WITH SYNTHETIC SUBSTITUTE, OPEN APPROACH: ICD-10-PCS | Performed by: SURGERY

## 2018-10-22 PROCEDURE — 21400001 HC TELEMETRY ROOM

## 2018-10-22 PROCEDURE — 88302 TISSUE EXAM BY PATHOLOGIST: CPT | Performed by: PATHOLOGY

## 2018-10-22 PROCEDURE — C1729 CATH, DRAINAGE: HCPCS | Performed by: SURGERY

## 2018-10-22 PROCEDURE — 85025 COMPLETE CBC W/AUTO DIFF WBC: CPT

## 2018-10-22 PROCEDURE — 11000001 HC ACUTE MED/SURG PRIVATE ROOM

## 2018-10-22 PROCEDURE — 94799 UNLISTED PULMONARY SVC/PX: CPT

## 2018-10-22 PROCEDURE — 25000003 PHARM REV CODE 250: Performed by: NURSE PRACTITIONER

## 2018-10-22 PROCEDURE — 71000033 HC RECOVERY, INTIAL HOUR: Performed by: SURGERY

## 2018-10-22 DEVICE — MESH PROLENE KNITTED 12X12: Type: IMPLANTABLE DEVICE | Site: ABDOMEN | Status: FUNCTIONAL

## 2018-10-22 RX ORDER — ACETAMINOPHEN 10 MG/ML
INJECTION, SOLUTION INTRAVENOUS
Status: DISCONTINUED | OUTPATIENT
Start: 2018-10-22 | End: 2018-10-22

## 2018-10-22 RX ORDER — LACTULOSE 10 G/15ML
20 SOLUTION ORAL EVERY 6 HOURS PRN
Status: DISCONTINUED | OUTPATIENT
Start: 2018-10-22 | End: 2018-10-23 | Stop reason: HOSPADM

## 2018-10-22 RX ORDER — CEFAZOLIN SODIUM 2 G/50ML
2 SOLUTION INTRAVENOUS
Status: COMPLETED | OUTPATIENT
Start: 2018-10-22 | End: 2018-10-23

## 2018-10-22 RX ORDER — MEPERIDINE HYDROCHLORIDE 50 MG/ML
12.5 INJECTION INTRAMUSCULAR; INTRAVENOUS; SUBCUTANEOUS ONCE AS NEEDED
Status: DISCONTINUED | OUTPATIENT
Start: 2018-10-22 | End: 2018-10-22 | Stop reason: HOSPADM

## 2018-10-22 RX ORDER — CLONIDINE HYDROCHLORIDE 0.1 MG/1
0.1 TABLET ORAL EVERY 6 HOURS PRN
Status: DISCONTINUED | OUTPATIENT
Start: 2018-10-22 | End: 2018-10-23 | Stop reason: HOSPADM

## 2018-10-22 RX ORDER — MORPHINE SULFATE 10 MG/ML
INJECTION, SOLUTION INTRAMUSCULAR; INTRAVENOUS
Status: DISCONTINUED | OUTPATIENT
Start: 2018-10-22 | End: 2018-10-22

## 2018-10-22 RX ORDER — AMOXICILLIN 250 MG
1 CAPSULE ORAL 2 TIMES DAILY
Status: DISCONTINUED | OUTPATIENT
Start: 2018-10-22 | End: 2018-10-23 | Stop reason: HOSPADM

## 2018-10-22 RX ORDER — DIPHENHYDRAMINE HYDROCHLORIDE 50 MG/ML
25 INJECTION INTRAMUSCULAR; INTRAVENOUS EVERY 6 HOURS PRN
Status: DISCONTINUED | OUTPATIENT
Start: 2018-10-22 | End: 2018-10-22 | Stop reason: HOSPADM

## 2018-10-22 RX ORDER — FENTANYL CITRATE 50 UG/ML
25 INJECTION, SOLUTION INTRAMUSCULAR; INTRAVENOUS EVERY 5 MIN PRN
Status: DISCONTINUED | OUTPATIENT
Start: 2018-10-22 | End: 2018-10-22 | Stop reason: HOSPADM

## 2018-10-22 RX ORDER — ONDANSETRON 2 MG/ML
4 INJECTION INTRAMUSCULAR; INTRAVENOUS DAILY PRN
Status: DISCONTINUED | OUTPATIENT
Start: 2018-10-22 | End: 2018-10-22 | Stop reason: HOSPADM

## 2018-10-22 RX ORDER — HYDROMORPHONE HYDROCHLORIDE 1 MG/ML
1 INJECTION, SOLUTION INTRAMUSCULAR; INTRAVENOUS; SUBCUTANEOUS
Status: DISCONTINUED | OUTPATIENT
Start: 2018-10-22 | End: 2018-10-23 | Stop reason: HOSPADM

## 2018-10-22 RX ORDER — ROCURONIUM BROMIDE 10 MG/ML
INJECTION, SOLUTION INTRAVENOUS
Status: DISCONTINUED | OUTPATIENT
Start: 2018-10-22 | End: 2018-10-22

## 2018-10-22 RX ORDER — HYDRALAZINE HYDROCHLORIDE 20 MG/ML
10 INJECTION INTRAMUSCULAR; INTRAVENOUS ONCE
Status: COMPLETED | OUTPATIENT
Start: 2018-10-22 | End: 2018-10-22

## 2018-10-22 RX ORDER — MIDAZOLAM HYDROCHLORIDE 1 MG/ML
INJECTION, SOLUTION INTRAMUSCULAR; INTRAVENOUS
Status: DISCONTINUED | OUTPATIENT
Start: 2018-10-22 | End: 2018-10-22

## 2018-10-22 RX ORDER — SODIUM CHLORIDE 9 MG/ML
INJECTION, SOLUTION INTRAVENOUS CONTINUOUS
Status: DISCONTINUED | OUTPATIENT
Start: 2018-10-22 | End: 2018-10-22

## 2018-10-22 RX ORDER — SODIUM CHLORIDE, SODIUM LACTATE, POTASSIUM CHLORIDE, CALCIUM CHLORIDE 600; 310; 30; 20 MG/100ML; MG/100ML; MG/100ML; MG/100ML
INJECTION, SOLUTION INTRAVENOUS CONTINUOUS PRN
Status: DISCONTINUED | OUTPATIENT
Start: 2018-10-22 | End: 2018-10-22

## 2018-10-22 RX ORDER — DIPHENHYDRAMINE HCL 25 MG
25 CAPSULE ORAL EVERY 4 HOURS PRN
Status: DISCONTINUED | OUTPATIENT
Start: 2018-10-22 | End: 2018-10-23 | Stop reason: HOSPADM

## 2018-10-22 RX ORDER — ACETAMINOPHEN 325 MG/1
650 TABLET ORAL EVERY 6 HOURS PRN
Status: DISCONTINUED | OUTPATIENT
Start: 2018-10-22 | End: 2018-10-23 | Stop reason: HOSPADM

## 2018-10-22 RX ORDER — HYDRALAZINE HYDROCHLORIDE 20 MG/ML
10 INJECTION INTRAMUSCULAR; INTRAVENOUS EVERY 6 HOURS PRN
Status: DISCONTINUED | OUTPATIENT
Start: 2018-10-22 | End: 2018-10-23 | Stop reason: HOSPADM

## 2018-10-22 RX ORDER — KETOROLAC TROMETHAMINE 30 MG/ML
INJECTION, SOLUTION INTRAMUSCULAR; INTRAVENOUS
Status: DISCONTINUED | OUTPATIENT
Start: 2018-10-22 | End: 2018-10-22

## 2018-10-22 RX ORDER — SODIUM CHLORIDE, SODIUM LACTATE, POTASSIUM CHLORIDE, CALCIUM CHLORIDE 600; 310; 30; 20 MG/100ML; MG/100ML; MG/100ML; MG/100ML
INJECTION, SOLUTION INTRAVENOUS CONTINUOUS
Status: DISCONTINUED | OUTPATIENT
Start: 2018-10-22 | End: 2018-10-23 | Stop reason: HOSPADM

## 2018-10-22 RX ORDER — PHENYLEPHRINE HYDROCHLORIDE 10 MG/ML
INJECTION INTRAVENOUS
Status: DISCONTINUED | OUTPATIENT
Start: 2018-10-22 | End: 2018-10-22

## 2018-10-22 RX ORDER — HYDROMORPHONE HYDROCHLORIDE 2 MG/ML
0.2 INJECTION, SOLUTION INTRAMUSCULAR; INTRAVENOUS; SUBCUTANEOUS EVERY 5 MIN PRN
Status: DISCONTINUED | OUTPATIENT
Start: 2018-10-22 | End: 2018-10-22 | Stop reason: HOSPADM

## 2018-10-22 RX ORDER — METOPROLOL TARTRATE 1 MG/ML
5 INJECTION, SOLUTION INTRAVENOUS ONCE
Status: COMPLETED | OUTPATIENT
Start: 2018-10-22 | End: 2018-10-22

## 2018-10-22 RX ORDER — BUPIVACAINE HYDROCHLORIDE 2.5 MG/ML
INJECTION, SOLUTION EPIDURAL; INFILTRATION; INTRACAUDAL
Status: DISCONTINUED | OUTPATIENT
Start: 2018-10-22 | End: 2018-10-22 | Stop reason: HOSPADM

## 2018-10-22 RX ORDER — PROPOFOL 10 MG/ML
VIAL (ML) INTRAVENOUS
Status: DISCONTINUED | OUTPATIENT
Start: 2018-10-22 | End: 2018-10-22

## 2018-10-22 RX ORDER — CHLORHEXIDINE GLUCONATE ORAL RINSE 1.2 MG/ML
10 SOLUTION DENTAL 2 TIMES DAILY
Status: DISCONTINUED | OUTPATIENT
Start: 2018-10-22 | End: 2018-10-23 | Stop reason: HOSPADM

## 2018-10-22 RX ORDER — ONDANSETRON 2 MG/ML
INJECTION INTRAMUSCULAR; INTRAVENOUS
Status: DISCONTINUED | OUTPATIENT
Start: 2018-10-22 | End: 2018-10-22

## 2018-10-22 RX ORDER — HYDROCODONE BITARTRATE AND ACETAMINOPHEN 5; 325 MG/1; MG/1
1 TABLET ORAL
Status: DISCONTINUED | OUTPATIENT
Start: 2018-10-22 | End: 2018-10-22 | Stop reason: HOSPADM

## 2018-10-22 RX ORDER — DIPHENHYDRAMINE HYDROCHLORIDE 50 MG/ML
25 INJECTION INTRAMUSCULAR; INTRAVENOUS EVERY 4 HOURS PRN
Status: DISCONTINUED | OUTPATIENT
Start: 2018-10-22 | End: 2018-10-23 | Stop reason: HOSPADM

## 2018-10-22 RX ORDER — NEOSTIGMINE METHYLSULFATE 1 MG/ML
INJECTION, SOLUTION INTRAVENOUS
Status: DISCONTINUED | OUTPATIENT
Start: 2018-10-22 | End: 2018-10-22

## 2018-10-22 RX ORDER — BISACODYL 10 MG
10 SUPPOSITORY, RECTAL RECTAL DAILY PRN
Status: DISCONTINUED | OUTPATIENT
Start: 2018-10-22 | End: 2018-10-23 | Stop reason: HOSPADM

## 2018-10-22 RX ORDER — ONDANSETRON 8 MG/1
8 TABLET, ORALLY DISINTEGRATING ORAL EVERY 8 HOURS PRN
Status: DISCONTINUED | OUTPATIENT
Start: 2018-10-22 | End: 2018-10-23 | Stop reason: HOSPADM

## 2018-10-22 RX ORDER — RAMELTEON 8 MG/1
8 TABLET ORAL NIGHTLY PRN
Status: DISCONTINUED | OUTPATIENT
Start: 2018-10-22 | End: 2018-10-23 | Stop reason: HOSPADM

## 2018-10-22 RX ORDER — HYDROCODONE BITARTRATE AND ACETAMINOPHEN 5; 325 MG/1; MG/1
1 TABLET ORAL EVERY 4 HOURS PRN
Status: DISCONTINUED | OUTPATIENT
Start: 2018-10-22 | End: 2018-10-23 | Stop reason: HOSPADM

## 2018-10-22 RX ORDER — FENTANYL CITRATE 50 UG/ML
INJECTION, SOLUTION INTRAMUSCULAR; INTRAVENOUS
Status: DISCONTINUED | OUTPATIENT
Start: 2018-10-22 | End: 2018-10-22

## 2018-10-22 RX ORDER — GLYCOPYRROLATE 0.2 MG/ML
INJECTION INTRAMUSCULAR; INTRAVENOUS
Status: DISCONTINUED | OUTPATIENT
Start: 2018-10-22 | End: 2018-10-22

## 2018-10-22 RX ORDER — LIDOCAINE HYDROCHLORIDE 10 MG/ML
INJECTION INFILTRATION; PERINEURAL
Status: DISCONTINUED | OUTPATIENT
Start: 2018-10-22 | End: 2018-10-22

## 2018-10-22 RX ADMIN — MORPHINE SULFATE 5 MG: 10 INJECTION INTRAMUSCULAR; INTRAVENOUS; SUBCUTANEOUS at 02:10

## 2018-10-22 RX ADMIN — FENTANYL CITRATE 100 MCG: 50 INJECTION, SOLUTION INTRAMUSCULAR; INTRAVENOUS at 01:10

## 2018-10-22 RX ADMIN — METOPROLOL TARTRATE 5 MG: 5 INJECTION, SOLUTION INTRAVENOUS at 04:10

## 2018-10-22 RX ADMIN — SODIUM CHLORIDE, SODIUM LACTATE, POTASSIUM CHLORIDE, AND CALCIUM CHLORIDE: 600; 310; 30; 20 INJECTION, SOLUTION INTRAVENOUS at 01:10

## 2018-10-22 RX ADMIN — PROPOFOL 200 MG: 10 INJECTION, EMULSION INTRAVENOUS at 01:10

## 2018-10-22 RX ADMIN — HYDRALAZINE HYDROCHLORIDE 10 MG: 20 INJECTION INTRAMUSCULAR; INTRAVENOUS at 09:10

## 2018-10-22 RX ADMIN — CEFAZOLIN SODIUM 2 G: 2 SOLUTION INTRAVENOUS at 09:10

## 2018-10-22 RX ADMIN — NEOSTIGMINE METHYLSULFATE 5 MG: 1 INJECTION INTRAVENOUS at 02:10

## 2018-10-22 RX ADMIN — HYDROMORPHONE HYDROCHLORIDE 1 MG: 1 INJECTION, SOLUTION INTRAMUSCULAR; INTRAVENOUS; SUBCUTANEOUS at 07:10

## 2018-10-22 RX ADMIN — ROCURONIUM BROMIDE 50 MG: 10 INJECTION, SOLUTION INTRAVENOUS at 01:10

## 2018-10-22 RX ADMIN — CHLORHEXIDINE GLUCONATE 10 ML: 1.2 RINSE ORAL at 09:10

## 2018-10-22 RX ADMIN — FENTANYL CITRATE 25 MCG: 50 INJECTION INTRAMUSCULAR; INTRAVENOUS at 03:10

## 2018-10-22 RX ADMIN — LIDOCAINE HYDROCHLORIDE 50 MG: 10 INJECTION, SOLUTION INFILTRATION; PERINEURAL at 01:10

## 2018-10-22 RX ADMIN — HYDRALAZINE HYDROCHLORIDE 10 MG: 20 INJECTION INTRAMUSCULAR; INTRAVENOUS at 02:10

## 2018-10-22 RX ADMIN — HYDROMORPHONE HYDROCHLORIDE 0.5 MG: 1 INJECTION, SOLUTION INTRAMUSCULAR; INTRAVENOUS; SUBCUTANEOUS at 01:10

## 2018-10-22 RX ADMIN — CEFAZOLIN 3 G: 1 INJECTION, POWDER, FOR SOLUTION INTRAMUSCULAR; INTRAVENOUS at 01:10

## 2018-10-22 RX ADMIN — KETOROLAC TROMETHAMINE 30 MG: 30 INJECTION, SOLUTION INTRAMUSCULAR; INTRAVENOUS at 02:10

## 2018-10-22 RX ADMIN — SODIUM CHLORIDE, SODIUM LACTATE, POTASSIUM CHLORIDE, AND CALCIUM CHLORIDE: .6; .31; .03; .02 INJECTION, SOLUTION INTRAVENOUS at 05:10

## 2018-10-22 RX ADMIN — PHENYLEPHRINE HYDROCHLORIDE 200 MCG: 10 INJECTION INTRAVENOUS at 01:10

## 2018-10-22 RX ADMIN — ONDANSETRON 4 MG: 2 INJECTION, SOLUTION INTRAMUSCULAR; INTRAVENOUS at 02:10

## 2018-10-22 RX ADMIN — MIDAZOLAM 2 MG: 1 INJECTION INTRAMUSCULAR; INTRAVENOUS at 01:10

## 2018-10-22 RX ADMIN — ROBINUL 0.8 MG: 0.2 INJECTION INTRAMUSCULAR; INTRAVENOUS at 02:10

## 2018-10-22 RX ADMIN — ACETAMINOPHEN 1000 MG: 10 INJECTION, SOLUTION INTRAVENOUS at 02:10

## 2018-10-22 RX ADMIN — SENNOSIDES AND DOCUSATE SODIUM 1 TABLET: 8.6; 5 TABLET ORAL at 09:10

## 2018-10-22 NOTE — ASSESSMENT & PLAN NOTE
General Surgery following   S/P Robotic Incisional Hernia Repair today   Clear liquid diet   Continue Analgesics and antiemetics as needed.

## 2018-10-22 NOTE — ANESTHESIA PREPROCEDURE EVALUATION
10/22/2018  Yaneth Fabian is a 62 y.o., female.    Anesthesia Evaluation    I have reviewed the Patient Summary Reports.    I have reviewed the Nursing Notes.   I have reviewed the Medications.     Review of Systems  Anesthesia Hx:  No problems with previous Anesthesia  History of prior surgery of interest to airway management or planning: Denies Family Hx of Anesthesia complications.   Denies Personal Hx of Anesthesia complications.   Social:  Non-Smoker, No Alcohol Use    Hematology/Oncology:  Hematology Normal   Oncology Normal     EENT/Dental:EENT/Dental Normal   Cardiovascular:   Hypertension hyperlipidemia    Pulmonary:   Asthma    Renal/:  Renal/ Normal     Hepatic/GI:   GERD    Musculoskeletal:   Arthritis     Neurological:  Neurology Normal    Endocrine:  Endocrine Normal        Physical Exam  General:  Well nourished    Airway/Jaw/Neck:  Airway Findings: Mouth Opening: Normal Tongue: Normal  General Airway Assessment: Adult  Mallampati: III  TM Distance: Normal, at least 6 cm  Jaw/Neck Findings:  Neck ROM: Normal ROM       Chest/Lungs:  Chest/Lungs Findings: Clear to auscultation, Normal Respiratory Rate     Heart/Vascular:  Heart Findings: Rate: Normal  Rhythm: Regular Rhythm             Anesthesia Plan  Type of Anesthesia, risks & benefits discussed:  Anesthesia Type:  general  Patient's Preference:   Intra-op Monitoring Plan: standard ASA monitors  Intra-op Monitoring Plan Comments:   Post Op Pain Control Plan:   Post Op Pain Control Plan Comments:   Induction:   IV  Beta Blocker:  Patient is not currently on a Beta-Blocker (No further documentation required).       Informed Consent: Patient understands risks and agrees with Anesthesia plan.  Questions answered. Anesthesia consent signed with patient.  ASA Score: 2     Day of Surgery Review of History & Physical: I have interviewed and  examined the patient. I have reviewed the patient's H&P dated:  There are no significant changes.  H&P update referred to the surgeon.         Ready For Surgery From Anesthesia Perspective.

## 2018-10-22 NOTE — PROGRESS NOTES
[facility] Hospital Medicine  Progress Note    Patient Name: Yaneth Fabian  MRN: [id number]  Patient Class: IP- Inpatient   Admission Date: 10/20/2018  Length of Stay: 2 days  Attending Physician: Rahul Aguirre MD  Primary Care Provider: Vahe Hawkins MD        Subjective:     Principal Problem:Incisional hernia, without obstruction or gangrene    HPI:  Yaneth Fabian is a 62 y.o. female presenting  as transfer from [facility] for surgical evaluation of findings concerning incarcerated hernia.  Patient reports that she has had intermittent episodes of periumbilical abdominal pain over the past couple days worsening today. It is worsened when she stands up straight.  Better when she remains in a fetal position or vomitis.  It is associated with nausea and vomiting and loose stools. Prior surgeries include appendectomy and hernia surgery.  Prior treatment includes taking Prilosec, Pepto-Bismol, and aspirin 81 mg with no improvement in symptoms per patient. Patient evaluated in ER. Labs stable, Lactic negative. CT ABD/Pelvis .1. Right sided spigelian hernia containing a loop of ascending colon.  There is inflammation about the herniated bowel loop.  Please correlate clinically for incarcerated hernia.  No definite bowel obstruction related to this process.2. Sigmoid colon diverticulosis.3. Prior cholecystectomy. General Surgery was consulted by ER.  and General Surgery discussed case,  will admit patient.         Hospital Course:  Patient admitted for spigelian hernia. CT abdomen/pelvis showed right sided spigelian hernia containing a loop of ascending colon.  There is inflammation about the herniated bowel loop.  Please correlate clinically for incarcerated hernia.  No definite bowel obstruction related to this process. Sigmoid colon diverticulosis. General surgery consult. Patient NPO. Analgesics and antiemetics prn. Plan for hernia repair surgery tomorrow per Dr. Goddard.     10/22/2018-  Patient seen and examined, s/p incisional hernia repair by Dr Goddard today. Labs stable. Blood pressure elevated, will use hydralazine as needed. Analgesic and antiemetic as needed.     Past Medical History:   Diagnosis Date    Arthritis     Asthma     DVT (deep venous thrombosis)     left popliteal    GERD (gastroesophageal reflux disease)     Hyperlipidemia     Hypertension     Insomnia 6/13/2013    Knee pain     Obesity (BMI 35.0-39.9 without comorbidity) 2/20/2017       Past Surgical History:   Procedure Laterality Date    APPENDECTOMY      BLADDER REPAIR      HERNIA REPAIR      JOINT REPLACEMENT      KNEE SURGERY      right    TUBAL LIGATION      TYMPANIC MEMBRANE REPAIR         Review of patient's allergies indicates:  No Known Allergies    No current facility-administered medications on file prior to encounter.      Current Outpatient Medications on File Prior to Encounter   Medication Sig    ALBUTEROL INHL Inhale into the lungs.    benazepril (LOTENSIN) 10 MG tablet Take 10 mg by mouth once daily.    furosemide (LASIX) 20 MG tablet Take 1 tablet (20 mg total) by mouth once daily.    meloxicam (MOBIC) 15 MG tablet TAKE 1 TABLET DAILY BY MOUTH WITH FOOD    montelukast (SINGULAIR) 10 mg tablet Take 10 mg by mouth every evening.    nabumetone (RELAFEN) 500 MG tablet Take 500 mg by mouth once daily.    pantoprazole (PROTONIX) 40 MG tablet Take 40 mg by mouth once daily.     potassium chloride (KLOR-CON) 10 MEQ TbSR Take 1 tablet (10 mEq total) by mouth once daily.    predniSONE (DELTASONE) 20 MG tablet Take 1 tablet 3 times per day for 3 days, then Take 1 tablet 2 times per day for 3 days, then Take 1 tablet daily for 3 days    zolpidem (AMBIEN) 5 MG Tab Take 1 tablet (5 mg total) by mouth nightly as needed.     Family History     Problem Relation (Age of Onset)    Diabetes Mother    Heart disease Mother    Hypertension Mother    No Known Problems Father        Tobacco Use    Smoking  status: Never Smoker    Smokeless tobacco: Never Used   Substance and Sexual Activity    Alcohol use: No    Drug use: No    Sexual activity: Yes     Partners: Male     Comment: mut monog     Review of Systems   Constitutional: Negative for chills and fever.   HENT: Negative for congestion, rhinorrhea and sinus pressure.    Respiratory: Negative for apnea, cough, choking, chest tightness, shortness of breath, wheezing and stridor.    Cardiovascular: Negative for chest pain, palpitations and leg swelling.   Gastrointestinal: Positive for abdominal pain. Negative for abdominal distention, diarrhea, nausea and vomiting.   Endocrine: Negative for cold intolerance and heat intolerance.   Genitourinary: Negative for difficulty urinating and hematuria.   Musculoskeletal: Negative for arthralgias and joint swelling.   Skin: Negative for color change, pallor and rash.   Neurological: Negative for dizziness, seizures, weakness, numbness and headaches.   Psychiatric/Behavioral: Negative for agitation. The patient is not nervous/anxious.      Objective:     Vital Signs (Most Recent):  Temp: 98.6 °F (37 °C) (10/22/18 1625)  Pulse: 83 (10/22/18 1625)  Resp: 18 (10/22/18 1625)  BP: (!) 187/85 (10/22/18 1625)  SpO2: 98 % (10/22/18 1625) Vital Signs (24h Range):  Temp:  [98 °F (36.7 °C)-98.9 °F (37.2 °C)] 98.6 °F (37 °C)  Pulse:  [] 83  Resp:  [13-25] 18  SpO2:  [92 %-98 %] 98 %  BP: (164-195)/(70-86) 187/85     Weight: 93.4 kg (205 lb 14.6 oz)  Body mass index is 37.06 kg/m².    Physical Exam   Constitutional: She is oriented to person, place, and time. No distress.   HENT:   Head: Normocephalic and atraumatic.   Eyes: Right eye exhibits no discharge. Left eye exhibits no discharge.   Cardiovascular: Exam reveals no gallop and no friction rub.   No murmur heard.  Pulmonary/Chest: No stridor. No respiratory distress. She has no wheezes. She has no rales. She exhibits no tenderness.   Abdominal: She exhibits no distension  and no mass. There is no tenderness. There is no rebound and no guarding. No hernia.   Right lower quadrant dressing intact. REBECCA drain intact    Musculoskeletal: She exhibits no edema or deformity.   Neurological: She is alert and oriented to person, place, and time.   Skin: Skin is warm and dry. Capillary refill takes less than 2 seconds. She is not diaphoretic.   Psychiatric: She has a normal mood and affect. Her behavior is normal. Thought content normal.   Nursing note and vitals reviewed.          Significant Labs:   CBC:   Recent Labs   Lab 10/20/18  1843 10/21/18  0511 10/22/18  0443   WBC 8.88 7.99 9.04   HGB 14.4 13.8 13.9   HCT 44.1 42.5 42.8    188 199     CMP:   Recent Labs   Lab 10/20/18  1843 10/21/18  0511 10/22/18  0443    140 138   K 4.0 3.6 4.2    105 105   CO2 25 25 24   * 107 125*   BUN 12 8 8   CREATININE 0.8 0.7 0.7   CALCIUM 9.5 9.0 9.0   PROT 7.6  --   --    ALBUMIN 3.8  --   --    BILITOT 0.5  --   --    ALKPHOS 89  --   --    AST 25  --   --    ALT 19  --   --    ANIONGAP 10 10 9   EGFRNONAA >60.0 >60 >60       Significant Imaging:     Imaging Results          CT Abdomen Pelvis With Contrast (Final result)  Result time 10/20/18 21:36:04    Final result by Daniel Robbins Jr., MD (10/20/18 21:36:04)                 Impression:      1. Right sided spigelian hernia containing a loop of ascending colon.  There is inflammation about the herniated bowel loop.  Please correlate clinically for incarcerated hernia.  No definite bowel obstruction related to this process.  2. Sigmoid colon diverticulosis.  3. Prior cholecystectomy.      Electronically signed by: Daniel Robbins Jr., MD  Date:    10/20/2018  Time:    21:36             Narrative:    EXAMINATION:  CT ABDOMEN PELVIS WITH CONTRAST    CLINICAL HISTORY:  abdominal pain;    TECHNIQUE:  Low dose axial images, sagittal and coronal reformations were obtained from the lung bases to the pubic symphysis following the IV  administration of 75 mL of Omnipaque 350 and the oral administration of 30 mL of Omnipaque 350. All CT scans at this facility use dose modulation, iterative reconstruction and/or weight based dosing when appropriate to reduce radiation dose to as low as reasonably achievable.    COMPARISON:  None.    FINDINGS:  Abdomen:    - Lower thorax:Heart size is normal.    - Lung bases: No infiltrates and no nodules.    - Liver: No focal mass.    - Gallbladder: Prior cholecystectomy.    - Bile Ducts: No evidence of intra or extra hepatic biliary ductal dilation.    - Spleen: Negative.    - Kidneys: No mass or hydronephrosis.    - Adrenals: Unremarkable.    - Pancreas: No mass or peripancreatic fat stranding.    - Retroperitoneum:  No significant adenopathy.    - Vascular: No abdominal aortic aneurysm.    - Abdominal wall:  There is a right-sided lateral ventral hernia with a neck measuring 66 mm containing loops of bowel.    Pelvis:    No pelvic mass, adenopathy, or free fluid.    Bowel/Mesentery:    Loops of colon herniates through a right lateral ventral hernia.  There is inflammation about the herniated bowel.  Small amount of fluid within the inferior aspect of the hernia sac as well.  No definite bowel obstruction.  No pneumatosis intestinalis.  Sigmoid colon diverticulosis.    Bones:  No acute osseous abnormality and no suspicious lytic or blastic lesion.  Multilevel degenerative osteophytosis.                              Assessment/Plan:      * Incisional hernia, without obstruction or gangrene    General Surgery following   S/P Robotic Incisional Hernia Repair today   Clear liquid diet   Continue Analgesics and antiemetics as needed.         Essential hypertension, benign    Pt. NPO  IV meds as need  Monitor trends    10/22/2018  Hydralazine 10 mg IV Q 6 hours as needed SBP>170       Hyperlipemia    Lipid panel normal   Ha1c 5.8  Pt. NPO, will resume when appropriate     10/22/2018    Clear liquid started by general  surgery        Obesity (BMI 35.0-39.9 without comorbidity)    Encourage diet, exercise, weight loss         VTE Risk Mitigation (From admission, onward)        Ordered     Place sequential compression device  Until discontinued      10/22/18 7980              Ernst Sommers NP  Department of Hospital Medicine   [facility]

## 2018-10-22 NOTE — INTERVAL H&P NOTE
The patient has been examined and the H&P has been reviewed:    I concur with the findings and no changes have occurred since H&P was written.    Anesthesia/Surgery risks, benefits and alternative options discussed and understood by patient/family.          Active Hospital Problems    Diagnosis  POA    *Spigelian hernia [K43.9]  Yes    Obesity (BMI 35.0-39.9 without comorbidity) [E66.9]  Yes    Hyperlipemia [E78.5]  Yes     Chronic    Essential hypertension, benign [I10]  Yes      Resolved Hospital Problems   No resolved problems to display.

## 2018-10-22 NOTE — ASSESSMENT & PLAN NOTE
Pt. NPO  IV meds as need  Monitor trends    10/22/2018  Hydralazine 10 mg IV Q 6 hours as needed SBP>170

## 2018-10-22 NOTE — NURSING
Pt SBP>170 after receiving hydralazine per order, and complaining of persistent 7/10 pain. Contacted Melvin Juárez NP, he ordered dilaudid 0.5mg IVP once. Will continue to monitor.

## 2018-10-22 NOTE — TRANSFER OF CARE
"Anesthesia Transfer of Care Note    Patient: Yaneth Fabian    Procedure(s) Performed: Procedure(s) (LRB):  XI ROBOTIC REPAIR, HERNIA, INCISIONAL (N/A)    Patient location: PACU    Anesthesia Type: MAC    Transport from OR: Transported from OR on room air with adequate spontaneous ventilation    Post pain: adequate analgesia    Post assessment: no apparent anesthetic complications    Post vital signs: stable    Level of consciousness: awake    Nausea/Vomiting: no nausea/vomiting    Complications: none    Transfer of care protocol was followed      Last vitals:   Visit Vitals  BP (!) 164/70 (BP Location: Left arm, Patient Position: Lying)   Pulse 96   Temp 36.8 °C (98.2 °F) (Oral)   Resp 18   Ht 5' 2.5" (1.588 m)   Wt 93.4 kg (205 lb 14.6 oz)   LMP 02/04/2017 (Exact Date)   SpO2 96%   Breastfeeding? No   BMI 37.06 kg/m²     "

## 2018-10-22 NOTE — PLAN OF CARE
Problem: Patient Care Overview  Goal: Plan of Care Review  Outcome: Ongoing (interventions implemented as appropriate)  Pt remained free of injury during shift, had high blood pressure throughout shift and received hydralazine per order, pain mildly controlled with PRN medication, no acute distress, receiving IV fluids, remained NPO, on cardiac monitoring (SR-ST, HR 80-100s), and will continue to monitor. 24hr chart review performed.

## 2018-10-22 NOTE — OP NOTE
Operative Note       SURGERY DATE:  10/22/2018    PRE-OP DIAGNOSIS:  Incarcerated hernia [K46.0]    POST-OP DIAGNOSIS:  Incarcerated hernia [K46.0]   Active Hospital Problems    Diagnosis  POA    *Spigelian hernia [K43.9]  Yes    Obesity (BMI 35.0-39.9 without comorbidity) [E66.9]  Yes    Hyperlipemia [E78.5]  Yes     Chronic    Essential hypertension, benign [I10]  Yes      Resolved Hospital Problems   No resolved problems to display.       Procedure(s) (LRB):  XI ROBOTIC REPAIR, HERNIA, INCISIONAL (N/A)    Surgeon(s) and Role:     * Skyler Goddard MD - Primary    ASSISTANTS: CARISSA Langford.  ANESTHESIA: General    FINDINGS: The hernia was through the previous appendectomy incision.    ESTIMATED BLOOD LOSS: 5 mL              COMPLICATIONS:  None    SPECIMEN:  hernia sac    Implants: Drain    INDICATION:  Hernia, incisional.    DESCRIPTION OF PROCEDURE:    The patient was taken to the operating room, and placed in supine position.  The patient underwent general anesthesia with orotracheal intubation.  Patient's abdomen was prepped and draped in the usual sterile manner.  After a time-out taken to identify the cause of the procedure as well as the ID of the patient, an incision was made directly over the hernia which was protruding from the right lower quadrant of the abdomen. The incision was below the old transverse appendectomy incision.  Upon entering the Robyn's fascia and the hernia sac was visualized and further dissected and noted the defect in the internal oblique fascia but the external oblique fascia was intact.  The external oblique fascia was opened along its fiber direction to identify the fascial defect in the internal oblique as well as transversalis fascia. The defect was measured to be approximately 3 cm in diameter.  After the hernia sac was then dissected around the entire perimeter of the neck then pursestring stitch was done with 0 silk and reduced the cecum was was in the hernia sac into the  peritoneal cavity. Once the hernia sac was treated and excess sac was resected and sent as a specimen, the internal oblique fascia was then freed underneath and closed in layers with 0 Ethibond with M6 needles.  Once the fascial defect was completely reinforced and the overlaid with external oblique fascia approximation, a 10 x 9 cm mesh was placed over the which repaired site and secured with 0 Ethibond with M6 needles around the parameter.  Then a small REBECCA drain was inserted, and brought out through the right lateral flank and secured with 3 0 nylon. Prior to the closure of the incision total of 30 mL of 0.5% Marcaine was injected generously, then incision was approximated in layers with 3 0 Vicryl as well as for 0 Vicryl.  During this procedure patient did well without any difficulty.  The incision was then dressed appropriately in the usual fashion.           CONDITION: Good    DISPOSITION: PACU - hemodynamically stable.     Skyler Goddard

## 2018-10-22 NOTE — ASSESSMENT & PLAN NOTE
Lipid panel normal   Ha1c 5.8  Pt. NPO, will resume when appropriate     10/22/2018    Clear liquid started by general surgery

## 2018-10-22 NOTE — NURSING
Pt SBP>170, had received dilaudid for 7/10 pain but SBP remained >170. Contacted Melvin Juárez NP, he ordered hydralazine 10mg IVP once. Will continue to monitor.

## 2018-10-23 VITALS
TEMPERATURE: 99 F | HEART RATE: 92 BPM | HEIGHT: 63 IN | BODY MASS INDEX: 36.49 KG/M2 | RESPIRATION RATE: 18 BRPM | WEIGHT: 205.94 LBS | OXYGEN SATURATION: 98 % | DIASTOLIC BLOOD PRESSURE: 75 MMHG | SYSTOLIC BLOOD PRESSURE: 179 MMHG

## 2018-10-23 LAB
ANION GAP SERPL CALC-SCNC: 10 MMOL/L
BASOPHILS # BLD AUTO: 0.01 K/UL
BASOPHILS NFR BLD: 0.1 %
BUN SERPL-MCNC: 8 MG/DL
CALCIUM SERPL-MCNC: 8.5 MG/DL
CHLORIDE SERPL-SCNC: 105 MMOL/L
CO2 SERPL-SCNC: 23 MMOL/L
CREAT SERPL-MCNC: 0.7 MG/DL
DIFFERENTIAL METHOD: ABNORMAL
EOSINOPHIL # BLD AUTO: 0 K/UL
EOSINOPHIL NFR BLD: 0.5 %
ERYTHROCYTE [DISTWIDTH] IN BLOOD BY AUTOMATED COUNT: 13.5 %
EST. GFR  (AFRICAN AMERICAN): >60 ML/MIN/1.73 M^2
EST. GFR  (NON AFRICAN AMERICAN): >60 ML/MIN/1.73 M^2
GLUCOSE SERPL-MCNC: 113 MG/DL
HCT VFR BLD AUTO: 39.4 %
HGB BLD-MCNC: 13 G/DL
LYMPHOCYTES # BLD AUTO: 1.7 K/UL
LYMPHOCYTES NFR BLD: 19.4 %
MCH RBC QN AUTO: 31.2 PG
MCHC RBC AUTO-ENTMCNC: 33 G/DL
MCV RBC AUTO: 95 FL
MONOCYTES # BLD AUTO: 0.9 K/UL
MONOCYTES NFR BLD: 10.4 %
NEUTROPHILS # BLD AUTO: 6 K/UL
NEUTROPHILS NFR BLD: 69.6 %
PLATELET # BLD AUTO: 184 K/UL
PMV BLD AUTO: 11.5 FL
POTASSIUM SERPL-SCNC: 3.5 MMOL/L
RBC # BLD AUTO: 4.17 M/UL
SODIUM SERPL-SCNC: 138 MMOL/L
WBC # BLD AUTO: 8.64 K/UL

## 2018-10-23 PROCEDURE — 36415 COLL VENOUS BLD VENIPUNCTURE: CPT

## 2018-10-23 PROCEDURE — 94799 UNLISTED PULMONARY SVC/PX: CPT

## 2018-10-23 PROCEDURE — 90471 IMMUNIZATION ADMIN: CPT | Performed by: INTERNAL MEDICINE

## 2018-10-23 PROCEDURE — 25000003 PHARM REV CODE 250: Performed by: SURGERY

## 2018-10-23 PROCEDURE — 90670 PCV13 VACCINE IM: CPT | Performed by: INTERNAL MEDICINE

## 2018-10-23 PROCEDURE — 63600175 PHARM REV CODE 636 W HCPCS: Performed by: SURGERY

## 2018-10-23 PROCEDURE — 90472 IMMUNIZATION ADMIN EACH ADD: CPT | Performed by: INTERNAL MEDICINE

## 2018-10-23 PROCEDURE — 80048 BASIC METABOLIC PNL TOTAL CA: CPT

## 2018-10-23 PROCEDURE — 85025 COMPLETE CBC W/AUTO DIFF WBC: CPT

## 2018-10-23 PROCEDURE — 3E02340 INTRODUCTION OF INFLUENZA VACCINE INTO MUSCLE, PERCUTANEOUS APPROACH: ICD-10-PCS | Performed by: SURGERY

## 2018-10-23 PROCEDURE — 63600175 PHARM REV CODE 636 W HCPCS: Performed by: INTERNAL MEDICINE

## 2018-10-23 PROCEDURE — 90686 IIV4 VACC NO PRSV 0.5 ML IM: CPT | Performed by: INTERNAL MEDICINE

## 2018-10-23 RX ORDER — CEPHALEXIN 500 MG/1
500 CAPSULE ORAL EVERY 6 HOURS
Qty: 28 CAPSULE | Refills: 0 | Status: SHIPPED | OUTPATIENT
Start: 2018-10-23 | End: 2018-10-30

## 2018-10-23 RX ORDER — HYDROCODONE BITARTRATE AND ACETAMINOPHEN 5; 325 MG/1; MG/1
1 TABLET ORAL EVERY 6 HOURS PRN
Qty: 30 TABLET | Refills: 0 | Status: SHIPPED | OUTPATIENT
Start: 2018-10-23 | End: 2023-12-15 | Stop reason: CLARIF

## 2018-10-23 RX ADMIN — INFLUENZA A VIRUS A/MICHIGAN/45/2015 X-275 (H1N1) ANTIGEN (FORMALDEHYDE INACTIVATED), INFLUENZA A VIRUS A/SINGAPORE/INFIMH-16-0019/2016 IVR-186 (H3N2) ANTIGEN (FORMALDEHYDE INACTIVATED), INFLUENZA B VIRUS B/PHUKET/3073/2013 ANTIGEN (FORMALDEHYDE INACTIVATED), AND INFLUENZA B VIRUS B/MARYLAND/15/2016 BX-69A ANTIGEN (FORMALDEHYDE INACTIVATED) 0.5 ML: 15; 15; 15; 15 INJECTION, SUSPENSION INTRAMUSCULAR at 01:10

## 2018-10-23 RX ADMIN — CEFAZOLIN SODIUM 2 G: 2 SOLUTION INTRAVENOUS at 05:10

## 2018-10-23 RX ADMIN — CHLORHEXIDINE GLUCONATE 10 ML: 1.2 RINSE ORAL at 08:10

## 2018-10-23 RX ADMIN — SENNOSIDES AND DOCUSATE SODIUM 1 TABLET: 8.6; 5 TABLET ORAL at 08:10

## 2018-10-23 RX ADMIN — HYDROCODONE BITARTRATE AND ACETAMINOPHEN 1 TABLET: 5; 325 TABLET ORAL at 01:10

## 2018-10-23 RX ADMIN — HYDROMORPHONE HYDROCHLORIDE 1 MG: 1 INJECTION, SOLUTION INTRAMUSCULAR; INTRAVENOUS; SUBCUTANEOUS at 08:10

## 2018-10-23 RX ADMIN — PNEUMOCOCCAL 13-VALENT CONJUGATE VACCINE 0.5 ML: 2.2; 2.2; 2.2; 2.2; 2.2; 4.4; 2.2; 2.2; 2.2; 2.2; 2.2; 2.2; 2.2 INJECTION, SUSPENSION INTRAMUSCULAR at 01:10

## 2018-10-23 RX ADMIN — HYDROMORPHONE HYDROCHLORIDE 1 MG: 1 INJECTION, SOLUTION INTRAMUSCULAR; INTRAVENOUS; SUBCUTANEOUS at 02:10

## 2018-10-23 NOTE — PLAN OF CARE
10/23/18 1613   Final Note   Assessment Type Final Discharge Note   Anticipated Discharge Disposition Home   Right Care Referral Info   Post Acute Recommendation No Care

## 2018-10-23 NOTE — DISCHARGE SUMMARY
[facility] Hospital Medicine  Discharge Summary      Patient Name: Yaneth Fabian  MRN: [id number]  Admission Date: 10/20/2018  Hospital Length of Stay: 3 days  Discharge Date and Time: No discharge date for patient encounter.  Attending Physician: Rahul Aguirre MD   Discharging Provider: Ernst Sommers NP  Primary Care Provider: Vahe Hawkins MD      HPI:   Yaneth Fabian is a 62 y.o. female presenting  as transfer from [facility] for surgical evaluation of findings concerning incarcerated hernia.  Patient reports that she has had intermittent episodes of periumbilical abdominal pain over the past couple days worsening today. It is worsened when she stands up straight.  Better when she remains in a fetal position or vomitis.  It is associated with nausea and vomiting and loose stools. Prior surgeries include appendectomy and hernia surgery.  Prior treatment includes taking Prilosec, Pepto-Bismol, and aspirin 81 mg with no improvement in symptoms per patient. Patient evaluated in ER. Labs stable, Lactic negative. CT ABD/Pelvis .1. Right sided spigelian hernia containing a loop of ascending colon.  There is inflammation about the herniated bowel loop.  Please correlate clinically for incarcerated hernia.  No definite bowel obstruction related to this process.2. Sigmoid colon diverticulosis.3. Prior cholecystectomy. General Surgery was consulted by ER. HM and General Surgery discussed case, HM will admit patient.         Procedure(s) (LRB):  XI ROBOTIC REPAIR, HERNIA, INCISIONAL (N/A)      Hospital Course:   Ms Guerin is a 62 year old female who presented [facility] with complaints of abdominal pain. She was experiencing intermittent episodes of periumbilical abdominal pain. Associated symptoms include nausea, vomiting and loose stools.   CT abdomen/pelvis showed right sided spigelian hernia containing a loop of ascending colon with inflammation about the herniated bowel loop. No  definite bowel obstruction related to this process. Sigmoid colon diverticulosis. Case was reviewed with general surgery, patient underwent robotic incisional hernia repair by Dr Goddard on yesterday. She is feeling well today, vital signs and labs stable, REBECCA drain and dressing intact. Case reviewed with CARISSA Cherry. Ok to discharge today from general surgery standpoint, she has sent antibiotic and pain medication to pharmacy. Scheduled to follow up in clinic with Dr Goddard in one week. Patient was seen, examined and deemed suitable for discharge.              Consults:     No new Assessment & Plan notes have been filed under this hospital service since the last note was generated.  Service: Hospital Medicine    Final Active Diagnoses:    Diagnosis Date Noted POA    PRINCIPAL PROBLEM:  Incisional hernia, without obstruction or gangrene [K43.2]  Yes    Essential hypertension, benign [I10] 02/05/2014 Yes    Hyperlipemia [E78.5] 02/20/2015 Yes     Chronic    Obesity (BMI 35.0-39.9 without comorbidity) [E66.9] 02/20/2017 Yes      Problems Resolved During this Admission:       Discharged Condition: stable    Disposition: Home or Self Care    Follow Up:  Follow-up Information     Skyler Goddard MD On 10/30/2018.    Specialties:  General Surgery, Bariatrics  Why:  post op, drain. or schedule with Jo Ann Hassan PAC  Contact information:  [address] [facility] [address] [phone]                 Patient Instructions:      Diet Adult Regular     Notify your health care provider if you experience any of the following:  severe uncontrolled pain     Notify your health care provider if you experience any of the following:  persistent nausea and vomiting or diarrhea     Notify your health care provider if you experience any of the following:  temperature >100.4     Activity as tolerated       Significant Diagnostic Studies: Labs:   CMP   Recent Labs   Lab 10/22/18  0443 10/23/18  0346    138   K 4.2 3.5    105   CO2 24  23   * 113*   BUN 8 8   CREATININE 0.7 0.7   CALCIUM 9.0 8.5*   ANIONGAP 9 10   ESTGFRAFRICA >60 >60   EGFRNONAA >60 >60    and CBC   Recent Labs   Lab 10/22/18  0443 10/23/18  0346   WBC 9.04 8.64   HGB 13.9 13.0   HCT 42.8 39.4    184       Pending Diagnostic Studies:     None         Medications:  Reconciled Home Medications:      Medication List      START taking these medications    cephALEXin 500 MG capsule  Commonly known as:  KEFLEX  Take 1 capsule (500 mg total) by mouth every 6 (six) hours. for 7 days     HYDROcodone-acetaminophen 5-325 mg per tablet  Commonly known as:  NORCO  Take 1 tablet by mouth every 6 (six) hours as needed for Pain.        CONTINUE taking these medications    ALBUTEROL INHL  Inhale into the lungs.     benazepril 10 MG tablet  Commonly known as:  LOTENSIN  Take 10 mg by mouth once daily.     furosemide 20 MG tablet  Commonly known as:  LASIX  Take 1 tablet (20 mg total) by mouth once daily.     meloxicam 15 MG tablet  Commonly known as:  MOBIC  TAKE 1 TABLET DAILY BY MOUTH WITH FOOD     montelukast 10 mg tablet  Commonly known as:  SINGULAIR  Take 10 mg by mouth every evening.     pantoprazole 40 MG tablet  Commonly known as:  PROTONIX  Take 40 mg by mouth once daily.     potassium chloride 10 MEQ Tbsr  Commonly known as:  KLOR-CON  Take 1 tablet (10 mEq total) by mouth once daily.     predniSONE 20 MG tablet  Commonly known as:  DELTASONE  Take 1 tablet 3 times per day for 3 days, then Take 1 tablet 2 times per day for 3 days, then Take 1 tablet daily for 3 days     zolpidem 5 MG Tab  Commonly known as:  AMBIEN  Take 1 tablet (5 mg total) by mouth nightly as needed.        STOP taking these medications    apixaban 5 mg Tab  Commonly known as:  ELIQUIS     nabumetone 500 MG tablet  Commonly known as:  RELAFEN            Indwelling Lines/Drains at time of discharge:   Lines/Drains/Airways     Drain                 Drain/Device  10/22/18 1433 Right abdomen collapsible  closed device less than 1 day                Time spent on the discharge of patient: 40 minutes  Patient was seen and examined on the date of discharge and determined to be suitable for discharge.         Ernst Sommers NP  Department of Hospital Medicine  [facility]

## 2018-10-23 NOTE — ASSESSMENT & PLAN NOTE
S/p open hernia repair with mesh  Drain care and teaching  Advance diet as tolerated  Ok to discharge from surgical standpoint with PO Keflex x 7 days

## 2018-10-23 NOTE — PLAN OF CARE
Problem: Patient Care Overview  Goal: Plan of Care Review  Outcome: Ongoing (interventions implemented as appropriate)  Fall prevention precautions maintained, pt remained free of falls throughout shift, call bell and personal items within reach, pt's pain adequately controlled by prn pain medication, BP controlled with PRN medication. 24 hour chart check completed. Will continue to monitor

## 2018-10-23 NOTE — PLAN OF CARE
Problem: Patient Care Overview  Goal: Plan of Care Review  Outcome: Outcome(s) achieved Date Met: 10/23/18  Goals adequately met for discharge.

## 2018-10-23 NOTE — DISCHARGE INSTRUCTIONS
Remove clear dressing and gauze on 10/24/18. Ok to begin showering daily with soap and water. Pat dry. No other dressing needed. Ok to apply bandaid over the drain.     [facility]  Instructions for Patients and Families    If you have a smartphone with a front camera, you can now do a video visit instead of an office visit after surgery.  Please contact us at [phone] to schedule this or to convert your scheduled appointment to a video visit!    Before surgery:  The pre-op nurse from the hospital will call you before the day of your surgery to confirm your arrival time.  The time of your surgery may change due to emergencies or other unforeseen events.  Do not eat or drink anything after midnight the night before your procedure, except for clear liquids up to three hours before your surgery time, and sips of water with medication.  If you are not diabetic, it is recommended that you drink a glass of clear fruit juice (apple, grape, cranberry, not orange) three hours before your surgery time, but nothing after that.  If you are diabetic, you may have water or sugar-free clear liquids such as Crystal Light up to three hours before your surgery time.    Day of Surgery:  · You will go to a pre-op area where an IV will be started and you will speak to the anesthesiologist and surgeon.  · Your family will be updated throughout the operation.  After surgery, your family member may be taken to a private room for consultation with the surgeon.  This is for the privacy of your medical information and does not necessarily mean there is anything wrong.    If your incisions have:  · Glue:  You may take a bath or shower immediately and wash your skin as you normally do.  The glue will eventually crumble or peel off. Do not let your incisions soak under water.  · Strips: Leave them on, but it is OK if they fall off on their own. It is OK to get them wet 48 hours after surgery.  · Bandage: You may  remove it 2 days after your surgery, and then you may leave the incision open and take a shower or bath, unless otherwise instructed. If your bandage has clear plastic, you may shower with it on and then remove it 2 days after surgery.    Activities  · Walking is recommended after surgery; bed rest is not recommended unless specifically ordered.  · If you have had abdominal surgery, do not lift over 20 pounds for 4 weeks after surgery.  · If you have had hernia surgery, do not lift over 20 pounds for 6 weeks after surgery.  · You may drive when you are off your post-operative pain medication.  · Do not smoke after surgery, it decreases your ability to heal and increases the risk of infection and pneumonia.    Diet:  Drink lots of fluids after surgery.  You might not have much of an appetite at first, you may eat regular food when you feel ready, unless you are given special diet instructions.    Post-operative symptoms and medications  · It is safe to take over-the-counter medications for constipation, heartburn, sleep, or itching if needed.  Prescription pain medication may contain acetaminophen (Tylenol), so you should not take additional acetaminophen (Tylenol) at the same time as your pain medication.  · You may experience nausea, low fever/chills, and clear drainage from your incision, sometimes up to a month after surgery.  Notify our office if you have fever over 101 degrees, worsening redness around your incision, thick cloudy drainage, or inability to drink any liquids.  · You will experience some level of pain after surgery.  Your pain medication should help with the pain, but may not be able to eliminate it entirely.  Pain will decrease with time, and most pain will be gone by 4 to 6 weeks after surgery.  · We are not able to call in prescriptions for pain medication after hours or on weekends.  If your pain medication is ineffective or you will run out soon and need a refill, please call our office at  "[phone].  We are not able to replace pain medication that has been lost or stolen.    After surgery, you will either be discharged home or admitted to the hospital.  If you are admitted to the hospital, one of the surgeons or a physician assistant will see you once a day.  Due to scheduled surgery, we may see you in the afternoon or at night; however, your nurse is able to page us at any time.  If you feel there is a situation that is not being addressed properly, please dial [phone] from the phone in your room.    Follow-up appointment  · You will see your surgeon or a physician assistant in clinic for a follow-up appointment at either our [facility] (off [facility]) or [facility] [address] (off [facility]) locations, usually between one and four weeks after your surgery.  · The hospital nurses can make your follow up appointment, or you can make it online at [email or web address] or call [phone].  · If you have a smartphone with the [employer] suly, please let us know if you would like to do a video visit with your phone instead of a post-op office visit.    If you are signed up for [facility], install the "[employer]" suly to access your test results, send messages to your doctors, and schedule appointments from your phone!  "

## 2018-10-23 NOTE — SUBJECTIVE & OBJECTIVE
Interval History: s/p inisional hernia repair, ok for d/c from Gen Surg standpoint.     Medications:  Continuous Infusions:   lactated ringers 100 mL/hr at 10/22/18 1749     Scheduled Meds:   chlorhexidine  10 mL Mouth/Throat BID    nozaseptin   Each Nare BID    senna-docusate 8.6-50 mg  1 tablet Oral BID     PRN Meds:acetaminophen, bisacodyl, cloNIDine, dextrose 50%, dextrose 50%, diphenhydrAMINE, diphenhydrAMINE, glucagon (human recombinant), glucose, glucose, hydrALAZINE, HYDROcodone-acetaminophen, HYDROmorphone, influenza, lactulose, ondansetron, pneumoc 13-karyn conj-dip cr(PF), promethazine (PHENERGAN) IVPB, ramelteon, sodium chloride 0.9%     Review of patient's allergies indicates:  No Known Allergies  Objective:     Vital Signs (Most Recent):  Temp: 99.2 °F (37.3 °C) (10/23/18 0828)  Pulse: 83 (10/23/18 0829)  Resp: 18 (10/23/18 0828)  BP: (!) 179/75 (10/23/18 0829)  SpO2: 98 % (10/23/18 0828) Vital Signs (24h Range):  Temp:  [98 °F (36.7 °C)-99.2 °F (37.3 °C)] 99.2 °F (37.3 °C)  Pulse:  [] 83  Resp:  [13-25] 18  SpO2:  [92 %-98 %] 98 %  BP: (134-205)/(63-87) 179/75     Weight: 93.4 kg (205 lb 14.6 oz)  Body mass index is 37.06 kg/m².    Intake/Output - Last 3 Shifts       10/21 0700 - 10/22 0659 10/22 0700 - 10/23 0659 10/23 0700 - 10/24 0659    P.O. 0 360     I.V. (mL/kg) 2825 (30.2) 2716.7 (29.1)     IV Piggyback 0.5 100     Total Intake(mL/kg) 2825.5 (30.3) 3176.7 (34)     Urine (mL/kg/hr)  0 (0)     Other  70     Blood  25     Total Output  95     Net +2825.5 +3081.7            Urine Occurrence 9 x 1 x     Stool Occurrence 1 x            Physical Exam   Constitutional: She is oriented to person, place, and time. She appears well-developed and well-nourished.   HENT:   Head: Normocephalic and atraumatic.   Eyes: EOM are normal.   Cardiovascular: Normal rate and regular rhythm.   Pulmonary/Chest: Effort normal. No respiratory distress.   Abdominal: Soft.   Tender. Wound dressings clean and dry.  REBECCA in RLQ with blood tinged fluid.    Musculoskeletal: Normal range of motion.   Neurological: She is alert and oriented to person, place, and time.   Skin: Skin is warm and dry.   Psychiatric: She has a normal mood and affect. Thought content normal.   Vitals reviewed.      Significant Labs:  CBC:   Recent Labs   Lab 10/23/18  0346   WBC 8.64   RBC 4.17   HGB 13.0   HCT 39.4      MCV 95   MCH 31.2*   MCHC 33.0     CMP:   Recent Labs   Lab 10/20/18  1843  10/23/18  0346   *   < > 113*   CALCIUM 9.5   < > 8.5*   ALBUMIN 3.8  --   --    PROT 7.6  --   --       < > 138   K 4.0   < > 3.5   CO2 25   < > 23      < > 105   BUN 12   < > 8   CREATININE 0.8   < > 0.7   ALKPHOS 89  --   --    ALT 19  --   --    AST 25  --   --    BILITOT 0.5  --   --     < > = values in this interval not displayed.       Significant Diagnostics:  I have reviewed all pertinent imaging results/findings within the past 24 hours.

## 2018-10-24 NOTE — ANESTHESIA POSTPROCEDURE EVALUATION
"Anesthesia Post Evaluation    Patient: Yaneth Fabian    Procedure(s) Performed: Procedure(s) (LRB):  XI ROBOTIC REPAIR, HERNIA, INCISIONAL (N/A)    Final Anesthesia Type: general  Patient location during evaluation: PACU  Patient participation: Yes- Able to Participate  Level of consciousness: awake and alert  Post-procedure vital signs: reviewed and stable  Pain management: adequate  Airway patency: patent  PONV status at discharge: No PONV  Anesthetic complications: no      Cardiovascular status: blood pressure returned to baseline  Respiratory status: unassisted and spontaneous ventilation  Hydration status: euvolemic  Follow-up not needed.        Visit Vitals  BP (!) 179/75 (BP Location: Right arm, Patient Position: Lying)   Pulse 92   Temp 37.3 °C (99.2 °F) (Oral)   Resp 18   Ht 5' 2.5" (1.588 m)   Wt 93.4 kg (205 lb 14.6 oz)   LMP 02/04/2017 (Exact Date)   SpO2 98%   Breastfeeding? No   BMI 37.06 kg/m²       Pain/Aury Score: Pain Assessment Performed: Yes (10/23/2018  1:21 PM)  Presence of Pain: complains of pain/discomfort (10/23/2018  1:21 PM)  Pain Rating Prior to Med Admin: 8 (10/23/2018  1:21 PM)  Pain Rating Post Med Admin: 5 (10/23/2018 10:10 AM)        "

## 2018-10-30 ENCOUNTER — OFFICE VISIT (OUTPATIENT)
Dept: SURGERY | Facility: CLINIC | Age: 63
End: 2018-10-30
Payer: OTHER GOVERNMENT

## 2018-10-30 VITALS
SYSTOLIC BLOOD PRESSURE: 162 MMHG | DIASTOLIC BLOOD PRESSURE: 66 MMHG | TEMPERATURE: 98 F | BODY MASS INDEX: 36.86 KG/M2 | HEART RATE: 61 BPM | WEIGHT: 204.81 LBS

## 2018-10-30 DIAGNOSIS — Z98.890 STATUS POST REPAIR OF VENTRAL HERNIA: Primary | ICD-10-CM

## 2018-10-30 DIAGNOSIS — Z87.19 STATUS POST REPAIR OF VENTRAL HERNIA: Primary | ICD-10-CM

## 2018-10-30 PROCEDURE — 99213 OFFICE O/P EST LOW 20 MIN: CPT | Mod: PBBFAC,PO | Performed by: SURGERY

## 2018-10-30 PROCEDURE — 99999 PR PBB SHADOW E&M-EST. PATIENT-LVL III: CPT | Mod: PBBFAC,,, | Performed by: SURGERY

## 2018-10-30 PROCEDURE — 99024 POSTOP FOLLOW-UP VISIT: CPT | Mod: ,,, | Performed by: SURGERY

## 2018-10-30 NOTE — PROGRESS NOTES
Yaneth Fabian 62 y.o.female  This patient was seen for postoperative visit. Yaneth EVETTE Fabian has no specific complaints and denies of any issues relevant to the surgery. The wound has healed without any complications. REBECCA drain was removed.  I have discussed the pathology result with the patient. Yaneth EVETTE Fabian will follow up as needed.     Skyler Goddard

## 2019-07-02 ENCOUNTER — HOSPITAL ENCOUNTER (EMERGENCY)
Facility: HOSPITAL | Age: 64
Discharge: HOME | End: 2019-07-02
Attending: EMERGENCY MEDICINE
Payer: OTHER GOVERNMENT

## 2019-07-02 VITALS
BODY MASS INDEX: 38.81 KG/M2 | SYSTOLIC BLOOD PRESSURE: 153 MMHG | TEMPERATURE: 99 F | HEART RATE: 61 BPM | WEIGHT: 215.63 LBS | RESPIRATION RATE: 20 BRPM | DIASTOLIC BLOOD PRESSURE: 68 MMHG | OXYGEN SATURATION: 99 %

## 2019-07-02 DIAGNOSIS — R10.13 EPIGASTRIC PAIN: ICD-10-CM
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
## 2019-07-02 LAB
ALBUMIN SERPL BCP-MCNC: 3.4 G/DL (ref 3.5–5.2)
ALP SERPL-CCNC: 86 U/L (ref 55–135)
ALT SERPL W/O P-5'-P-CCNC: 20 U/L (ref 10–44)
AMYLASE SERPL-CCNC: 39 U/L (ref 20–110)
ANION GAP SERPL CALC-SCNC: 9 MMOL/L (ref 8–16)
AST SERPL-CCNC: 17 U/L (ref 10–40)
BASOPHILS # BLD AUTO: 0.02 K/UL (ref 0–0.2)
BASOPHILS NFR BLD: 0.3 % (ref 0–1.9)
BILIRUB SERPL-MCNC: 0.3 MG/DL (ref 0.1–1)
BILIRUB UR QL STRIP: NEGATIVE
BUN SERPL-MCNC: 13 MG/DL (ref 8–23)
CALCIUM SERPL-MCNC: 8.6 MG/DL (ref 8.7–10.5)
CHLORIDE SERPL-SCNC: 108 MMOL/L (ref 95–110)
CLARITY UR REFRACT.AUTO: CLEAR
CO2 SERPL-SCNC: 23 MMOL/L (ref 23–29)
COLOR UR AUTO: YELLOW
CREAT SERPL-MCNC: 0.8 MG/DL (ref 0.5–1.4)
DIFFERENTIAL METHOD: ABNORMAL
EOSINOPHIL # BLD AUTO: 0.1 K/UL (ref 0–0.5)
EOSINOPHIL NFR BLD: 1.4 % (ref 0–8)
ERYTHROCYTE [DISTWIDTH] IN BLOOD BY AUTOMATED COUNT: 13.2 % (ref 11.5–14.5)
EST. GFR  (AFRICAN AMERICAN): >60 ML/MIN/1.73 M^2
EST. GFR  (NON AFRICAN AMERICAN): >60 ML/MIN/1.73 M^2
GLUCOSE SERPL-MCNC: 143 MG/DL (ref 70–110)
GLUCOSE UR QL STRIP: NEGATIVE
HCT VFR BLD AUTO: 33.9 % (ref 37–48.5)
HGB BLD-MCNC: 10.7 G/DL (ref 12–16)
HGB UR QL STRIP: NEGATIVE
KETONES UR QL STRIP: NEGATIVE
LEUKOCYTE ESTERASE UR QL STRIP: NEGATIVE
LIPASE SERPL-CCNC: 23 U/L (ref 4–60)
LYMPHOCYTES # BLD AUTO: 2 K/UL (ref 1–4.8)
LYMPHOCYTES NFR BLD: 24.8 % (ref 18–48)
MCH RBC QN AUTO: 30.4 PG (ref 27–31)
MCHC RBC AUTO-ENTMCNC: 31.6 G/DL (ref 32–36)
MCV RBC AUTO: 96 FL (ref 82–98)
MONOCYTES # BLD AUTO: 0.7 K/UL (ref 0.3–1)
MONOCYTES NFR BLD: 9.1 % (ref 4–15)
NEUTROPHILS # BLD AUTO: 5.1 K/UL (ref 1.8–7.7)
NEUTROPHILS NFR BLD: 64.4 % (ref 38–73)
NITRITE UR QL STRIP: NEGATIVE
PH UR STRIP: 6 [PH] (ref 5–8)
PLATELET # BLD AUTO: 169 K/UL (ref 150–350)
PMV BLD AUTO: 11.3 FL (ref 9.2–12.9)
POTASSIUM SERPL-SCNC: 3.5 MMOL/L (ref 3.5–5.1)
PROT SERPL-MCNC: 6.8 G/DL (ref 6–8.4)
PROT UR QL STRIP: NEGATIVE
RBC # BLD AUTO: 3.52 M/UL (ref 4–5.4)
SODIUM SERPL-SCNC: 140 MMOL/L (ref 136–145)
SP GR UR STRIP: 1.02 (ref 1–1.03)
URN SPEC COLLECT METH UR: NORMAL
UROBILINOGEN UR STRIP-ACNC: NEGATIVE EU/DL
WBC # BLD AUTO: 7.99 K/UL (ref 3.9–12.7)

## 2019-07-02 PROCEDURE — 83690 ASSAY OF LIPASE: CPT | Mod: ER

## 2019-07-02 PROCEDURE — 99285 EMERGENCY DEPT VISIT HI MDM: CPT | Mod: 25,ER

## 2019-07-02 PROCEDURE — 93010 ELECTROCARDIOGRAM REPORT: CPT | Mod: ,,, | Performed by: INTERNAL MEDICINE

## 2019-07-02 PROCEDURE — 81003 URINALYSIS AUTO W/O SCOPE: CPT | Mod: ER

## 2019-07-02 PROCEDURE — 82150 ASSAY OF AMYLASE: CPT | Mod: ER

## 2019-07-02 PROCEDURE — 80053 COMPREHEN METABOLIC PANEL: CPT | Mod: ER

## 2019-07-02 PROCEDURE — 99900035 HC TECH TIME PER 15 MIN (STAT): Mod: ER

## 2019-07-02 PROCEDURE — 85025 COMPLETE CBC W/AUTO DIFF WBC: CPT | Mod: ER

## 2019-07-02 PROCEDURE — 93005 ELECTROCARDIOGRAM TRACING: CPT | Mod: ER

## 2019-07-02 PROCEDURE — 93010 EKG 12-LEAD: ICD-10-PCS | Mod: ,,, | Performed by: INTERNAL MEDICINE

## 2019-07-02 RX ORDER — SULFAMETHOXAZOLE AND TRIMETHOPRIM 800; 160 MG/1; MG/1
1 TABLET ORAL 2 TIMES DAILY
Qty: 14 TABLET | Refills: 0 | Status: SHIPPED | OUTPATIENT
Start: 2019-07-02 | End: 2019-07-09

## 2019-07-02 RX ORDER — SULFAMETHOXAZOLE AND TRIMETHOPRIM 800; 160 MG/1; MG/1
1 TABLET ORAL 2 TIMES DAILY
Qty: 14 TABLET | Refills: 0 | Status: SHIPPED | OUTPATIENT
Start: 2019-07-02 | End: 2019-07-02 | Stop reason: SDUPTHER

## 2019-07-03 NOTE — ED PROVIDER NOTES
"Encounter Date: 7/2/2019       History     Chief Complaint   Patient presents with    Abdominal Pain     c/o mid abd pain w/ n/v/ for "weeks"     Insect Bite     c/o rash possible insect bite to rt shoulder     Patient currently presents with concern regarding insect bite.  This is localize to the RIGHT shoulder.  This was acquired 3 days ago.  There is not significant swelling at the site though she does note redness.  Itching is noted.  Patient has not taken medication prior to arrival.      Patient additionally notes concern regarding intermittent ongoing abdominal pain. She describes that this has been happening for quite some time.  Patient notes diffuse bloating and tenderness.  She denies any associated fever, vomiting, or changes in bowel habits.  There are also no urinary complaints.  Past surgical history notable only for a prior appendectomy and ventral incisional hernia repair.  Notably the patient has little to no pain at present.        Review of patient's allergies indicates:  No Known Allergies  Past Medical History:   Diagnosis Date    Arthritis     Asthma     DVT (deep venous thrombosis)     left popliteal    GERD (gastroesophageal reflux disease)     Hyperlipidemia     Hypertension     Insomnia 6/13/2013    Knee pain     Obesity (BMI 35.0-39.9 without comorbidity) 2/20/2017     Past Surgical History:   Procedure Laterality Date    APPENDECTOMY      BLADDER REPAIR      HERNIA REPAIR      JOINT REPLACEMENT      KNEE SURGERY      right    TUBAL LIGATION      TYMPANIC MEMBRANE REPAIR      XI ROBOTIC REPAIR, HERNIA, INCISIONAL N/A 10/22/2018    Performed by Skyler Goddard MD at [facility]     Family History   Problem Relation Age of Onset    Heart disease Mother     Hypertension Mother     Diabetes Mother     No Known Problems Father         unknown who he was per pt    Breast cancer Neg Hx     Colon cancer Neg Hx     Ovarian cancer Neg Hx     Thrombosis Neg Hx      Social History "     Tobacco Use    Smoking status: Never Smoker    Smokeless tobacco: Never Used   Substance Use Topics    Alcohol use: No    Drug use: No     Review of Systems   Constitutional: Negative for chills and fever.   HENT: Negative for congestion and rhinorrhea.    Respiratory: Negative for cough, chest tightness, shortness of breath and wheezing.    Cardiovascular: Negative for chest pain, palpitations and leg swelling.   Gastrointestinal: Positive for abdominal pain. Negative for constipation, diarrhea, nausea and vomiting.   Genitourinary: Negative for dysuria, frequency, urgency, vaginal bleeding and vaginal discharge.   Skin: Negative for color change and rash.   Allergic/Immunologic: Negative for immunocompromised state.   Neurological: Negative for dizziness, weakness and numbness.   Hematological: Negative for adenopathy. Does not bruise/bleed easily.   All other systems reviewed and are negative.    Physical Exam     Initial Vitals [07/02/19 2116]   BP Pulse Resp Temp SpO2   (!) 147/65 83 20 99.1 °F (37.3 °C) 95 %      MAP       --         Vitals:    07/02/19 2116   BP: (!) 147/65   Pulse: 83   Resp: 20   Temp: 99.1 °F (37.3 °C)   TempSrc: Oral   SpO2: 95%   Weight: 97.8 kg (215 lb 9.8 oz)     Physical Exam    Nursing note and vitals reviewed.  Constitutional: She appears well-developed and well-nourished. She is not diaphoretic. She is Obese . No distress.   HENT:   Head: Normocephalic and atraumatic.   Right Ear: External ear normal.   Left Ear: External ear normal.   Nose: Nose normal.   Mouth/Throat: Oropharynx is clear and moist.   Eyes: Conjunctivae and EOM are normal. Pupils are equal, round, and reactive to light. No scleral icterus.   Neck: Neck supple. No tracheal deviation present. No JVD present.   Cardiovascular: Normal rate, regular rhythm, normal heart sounds and intact distal pulses. Exam reveals no gallop and no friction rub.    No murmur heard.  Pulmonary/Chest: Breath sounds normal. No  respiratory distress. She has no wheezes. She has no rhonchi. She has no rales.   Abdominal: Soft. Bowel sounds are normal. She exhibits no distension and no mass. There is no tenderness. There is no rebound and no guarding.   Transverse surgical incision scar noted in RLQ   Musculoskeletal: Normal range of motion. She exhibits no edema.   Neurological: She is alert and oriented to person, place, and time. She has normal strength. No cranial nerve deficit or sensory deficit.   Skin: Skin is warm and dry. No rash noted.        Psychiatric: She has a normal mood and affect. Her behavior is normal.       ED Course   Procedures  Labs Reviewed   CBC W/ AUTO DIFFERENTIAL - Abnormal; Notable for the following components:       Result Value    RBC 3.52 (*)     Hemoglobin 10.7 (*)     Hematocrit 33.9 (*)     Mean Corpuscular Hemoglobin Conc 31.6 (*)     All other components within normal limits   COMPREHENSIVE METABOLIC PANEL - Abnormal; Notable for the following components:    Glucose 143 (*)     Calcium 8.6 (*)     Albumin 3.4 (*)     All other components within normal limits   AMYLASE   LIPASE   URINALYSIS, REFLEX TO URINE CULTURE    Narrative:     Preferred Collection Type->Urine, Clean Catch     EKG Readings: (Independently Interpreted)   Initial Reading: No STEMI. Rhythm: Normal Sinus Rhythm. Heart Rate: 75. Ectopy: No Ectopy. Conduction: RBBB. T Waves Flipped: V1 and V2. Axis: Left Axis Deviation.       Imaging Results          X-Ray Abdomen Flat And Erect (Final result)  Result time 07/02/19 22:20:33    Final result by Rodney Kaufman MD (07/02/19 22:20:33)                 Impression:      No acute findings.      Electronically signed by: Rodney Kaufman MD  Date:    07/02/2019  Time:    22:20             Narrative:    EXAMINATION:  XR ABDOMEN FLAT AND ERECT    CLINICAL HISTORY:  Epigastric Pain;    TECHNIQUE:  Routine exam.    COMPARISON:  10/21/2018    FINDINGS:  Negative for bowel obstruction.Moderate scattered  stool    No suspicious calcifications.    Cholecystectomy clips.                                 Medical Decision Making:   ED Management:  All findings were reviewed with the patient/family in detail.  I see no indication of an emergent process beyond that addressed during our encounter but have duly counseled the patient/family regarding the need for prompt follow-up as well as the indications that should prompt immediate return to the emergency room should new or worrisome developments occur.  The patient/family communicates understanding of all this information and all remaining questions and concerns were addressed at this time.                          Clinical Impression:       ICD-10-CM ICD-9-CM   1. Infected insect bite or sting W57.XXXA 919.5     E906.4   2. Epigastric pain R10.13 789.06                                Marlon Leigh MD  07/02/19 7087       Marlon Leigh MD  07/02/19 3704

## 2020-01-28 ENCOUNTER — TELEPHONE (OUTPATIENT)
Dept: RADIOLOGY | Facility: HOSPITAL | Age: 65
End: 2020-01-28

## 2020-02-12 ENCOUNTER — HOSPITAL ENCOUNTER (OUTPATIENT)
Dept: RADIOLOGY | Facility: HOSPITAL | Age: 65
Discharge: HOME | End: 2020-02-12
Attending: INTERNAL MEDICINE
Payer: OTHER GOVERNMENT

## 2020-02-12 VITALS — HEIGHT: 63 IN | BODY MASS INDEX: 38.21 KG/M2 | WEIGHT: 215.63 LBS

## 2020-02-12 DIAGNOSIS — Z12.31 BREAST CANCER SCREENING BY MAMMOGRAM: ICD-10-CM

## 2020-02-12 PROCEDURE — 77067 MAMMO DIGITAL SCREENING BILAT WITH TOMOSYNTHESIS_CAD: ICD-10-PCS | Mod: 26,,, | Performed by: RADIOLOGY

## 2020-02-12 PROCEDURE — 77067 SCR MAMMO BI INCL CAD: CPT | Mod: TC,PO

## 2020-02-12 PROCEDURE — 77063 BREAST TOMOSYNTHESIS BI: CPT | Mod: 26,,, | Performed by: RADIOLOGY

## 2020-02-12 PROCEDURE — 77067 SCR MAMMO BI INCL CAD: CPT | Mod: 26,,, | Performed by: RADIOLOGY

## 2020-02-12 PROCEDURE — 77063 MAMMO DIGITAL SCREENING BILAT WITH TOMOSYNTHESIS_CAD: ICD-10-PCS | Mod: 26,,, | Performed by: RADIOLOGY

## 2020-07-20 ENCOUNTER — HOSPITAL ENCOUNTER (OUTPATIENT)
Dept: RADIOLOGY | Facility: HOSPITAL | Age: 65
Discharge: HOME | End: 2020-07-20
Attending: INTERNAL MEDICINE
Payer: OTHER GOVERNMENT

## 2020-07-20 DIAGNOSIS — M25.511 PAIN IN RIGHT SHOULDER: ICD-10-CM

## 2020-07-20 PROCEDURE — 73030 X-RAY EXAM OF SHOULDER: CPT | Mod: TC,50,PO

## 2020-07-20 PROCEDURE — 73030 X-RAY EXAM OF SHOULDER: CPT | Mod: 26,50,, | Performed by: RADIOLOGY

## 2020-07-20 PROCEDURE — 73030 XR SHOULDER COMPLETE 2 OR MORE VIEWS BILATERAL: ICD-10-PCS | Mod: 26,50,, | Performed by: RADIOLOGY

## 2020-08-26 NOTE — SUBJECTIVE & OBJECTIVE
Past Medical History:   Diagnosis Date    Arthritis     Asthma     DVT (deep venous thrombosis)     left popliteal    GERD (gastroesophageal reflux disease)     Hyperlipidemia     Hypertension     Insomnia 6/13/2013    Knee pain     Obesity (BMI 35.0-39.9 without comorbidity) 2/20/2017       Past Surgical History:   Procedure Laterality Date    APPENDECTOMY      BLADDER REPAIR      HERNIA REPAIR      JOINT REPLACEMENT      KNEE SURGERY      right    TUBAL LIGATION      TYMPANIC MEMBRANE REPAIR         Review of patient's allergies indicates:  No Known Allergies    No current facility-administered medications on file prior to encounter.      Current Outpatient Medications on File Prior to Encounter   Medication Sig    ALBUTEROL INHL Inhale into the lungs.    benazepril (LOTENSIN) 10 MG tablet Take 10 mg by mouth once daily.    furosemide (LASIX) 20 MG tablet Take 1 tablet (20 mg total) by mouth once daily.    meloxicam (MOBIC) 15 MG tablet TAKE 1 TABLET DAILY BY MOUTH WITH FOOD    montelukast (SINGULAIR) 10 mg tablet Take 10 mg by mouth every evening.    nabumetone (RELAFEN) 500 MG tablet Take 500 mg by mouth once daily.    pantoprazole (PROTONIX) 40 MG tablet Take 40 mg by mouth once daily.     potassium chloride (KLOR-CON) 10 MEQ TbSR Take 1 tablet (10 mEq total) by mouth once daily.    predniSONE (DELTASONE) 20 MG tablet Take 1 tablet 3 times per day for 3 days, then Take 1 tablet 2 times per day for 3 days, then Take 1 tablet daily for 3 days    zolpidem (AMBIEN) 5 MG Tab Take 1 tablet (5 mg total) by mouth nightly as needed.     Family History     Problem Relation (Age of Onset)    Diabetes Mother    Heart disease Mother    Hypertension Mother    No Known Problems Father        Tobacco Use    Smoking status: Never Smoker    Smokeless tobacco: Never Used   Substance and Sexual Activity    Alcohol use: No    Drug use: No    Sexual activity: Yes     Partners: Male     Comment: mut  Pt returned from nuc med. monog     Review of Systems   Constitutional: Negative for chills and fever.   HENT: Negative for congestion, rhinorrhea and sinus pressure.    Respiratory: Negative for apnea, cough, choking, chest tightness, shortness of breath, wheezing and stridor.    Cardiovascular: Negative for chest pain, palpitations and leg swelling.   Gastrointestinal: Positive for abdominal pain. Negative for abdominal distention, diarrhea, nausea and vomiting.   Endocrine: Negative for cold intolerance and heat intolerance.   Genitourinary: Negative for difficulty urinating and hematuria.   Musculoskeletal: Negative for arthralgias and joint swelling.   Skin: Negative for color change, pallor and rash.   Neurological: Negative for dizziness, seizures, weakness, numbness and headaches.   Psychiatric/Behavioral: Negative for agitation. The patient is not nervous/anxious.      Objective:     Vital Signs (Most Recent):  Temp: 98.6 °F (37 °C) (10/22/18 1625)  Pulse: 83 (10/22/18 1625)  Resp: 18 (10/22/18 1625)  BP: (!) 187/85 (10/22/18 1625)  SpO2: 98 % (10/22/18 1625) Vital Signs (24h Range):  Temp:  [98 °F (36.7 °C)-98.9 °F (37.2 °C)] 98.6 °F (37 °C)  Pulse:  [] 83  Resp:  [13-25] 18  SpO2:  [92 %-98 %] 98 %  BP: (164-195)/(70-86) 187/85     Weight: 93.4 kg (205 lb 14.6 oz)  Body mass index is 37.06 kg/m².    Physical Exam   Constitutional: She is oriented to person, place, and time. No distress.   HENT:   Head: Normocephalic and atraumatic.   Eyes: Right eye exhibits no discharge. Left eye exhibits no discharge.   Cardiovascular: Exam reveals no gallop and no friction rub.   No murmur heard.  Pulmonary/Chest: No stridor. No respiratory distress. She has no wheezes. She has no rales. She exhibits no tenderness.   Abdominal: She exhibits no distension and no mass. There is no tenderness. There is no rebound and no guarding. No hernia.   Right lower quadrant dressing intact. REBECCA drain intact    Musculoskeletal: She exhibits no edema or  deformity.   Neurological: She is alert and oriented to person, place, and time.   Skin: Skin is warm and dry. Capillary refill takes less than 2 seconds. She is not diaphoretic.   Psychiatric: She has a normal mood and affect. Her behavior is normal. Thought content normal.   Nursing note and vitals reviewed.          Significant Labs:   CBC:   Recent Labs   Lab 10/20/18  1843 10/21/18  0511 10/22/18  0443   WBC 8.88 7.99 9.04   HGB 14.4 13.8 13.9   HCT 44.1 42.5 42.8    188 199     CMP:   Recent Labs   Lab 10/20/18  1843 10/21/18  0511 10/22/18  0443    140 138   K 4.0 3.6 4.2    105 105   CO2 25 25 24   * 107 125*   BUN 12 8 8   CREATININE 0.8 0.7 0.7   CALCIUM 9.5 9.0 9.0   PROT 7.6  --   --    ALBUMIN 3.8  --   --    BILITOT 0.5  --   --    ALKPHOS 89  --   --    AST 25  --   --    ALT 19  --   --    ANIONGAP 10 10 9   EGFRNONAA >60.0 >60 >60       Significant Imaging:     Imaging Results          CT Abdomen Pelvis With Contrast (Final result)  Result time 10/20/18 21:36:04    Final result by Daniel Robbins Jr., MD (10/20/18 21:36:04)                 Impression:      1. Right sided spigelian hernia containing a loop of ascending colon.  There is inflammation about the herniated bowel loop.  Please correlate clinically for incarcerated hernia.  No definite bowel obstruction related to this process.  2. Sigmoid colon diverticulosis.  3. Prior cholecystectomy.      Electronically signed by: Daniel Robbins Jr., MD  Date:    10/20/2018  Time:    21:36             Narrative:    EXAMINATION:  CT ABDOMEN PELVIS WITH CONTRAST    CLINICAL HISTORY:  abdominal pain;    TECHNIQUE:  Low dose axial images, sagittal and coronal reformations were obtained from the lung bases to the pubic symphysis following the IV administration of 75 mL of Omnipaque 350 and the oral administration of 30 mL of Omnipaque 350. All CT scans at this facility use dose modulation, iterative reconstruction and/or weight based  dosing when appropriate to reduce radiation dose to as low as reasonably achievable.    COMPARISON:  None.    FINDINGS:  Abdomen:    - Lower thorax:Heart size is normal.    - Lung bases: No infiltrates and no nodules.    - Liver: No focal mass.    - Gallbladder: Prior cholecystectomy.    - Bile Ducts: No evidence of intra or extra hepatic biliary ductal dilation.    - Spleen: Negative.    - Kidneys: No mass or hydronephrosis.    - Adrenals: Unremarkable.    - Pancreas: No mass or peripancreatic fat stranding.    - Retroperitoneum:  No significant adenopathy.    - Vascular: No abdominal aortic aneurysm.    - Abdominal wall:  There is a right-sided lateral ventral hernia with a neck measuring 66 mm containing loops of bowel.    Pelvis:    No pelvic mass, adenopathy, or free fluid.    Bowel/Mesentery:    Loops of colon herniates through a right lateral ventral hernia.  There is inflammation about the herniated bowel.  Small amount of fluid within the inferior aspect of the hernia sac as well.  No definite bowel obstruction.  No pneumatosis intestinalis.  Sigmoid colon diverticulosis.    Bones:  No acute osseous abnormality and no suspicious lytic or blastic lesion.  Multilevel degenerative osteophytosis.

## 2020-12-08 ENCOUNTER — HOSPITAL ENCOUNTER (EMERGENCY)
Facility: HOSPITAL | Age: 65
Discharge: HOME | End: 2020-12-08
Attending: EMERGENCY MEDICINE
Payer: OTHER GOVERNMENT

## 2020-12-08 VITALS
WEIGHT: 217.38 LBS | TEMPERATURE: 98 F | HEART RATE: 75 BPM | BODY MASS INDEX: 40 KG/M2 | RESPIRATION RATE: 20 BRPM | DIASTOLIC BLOOD PRESSURE: 90 MMHG | OXYGEN SATURATION: 96 % | SYSTOLIC BLOOD PRESSURE: 182 MMHG | HEIGHT: 62 IN

## 2020-12-08 DIAGNOSIS — Z20.822 COVID-19 VIRUS NOT DETECTED: Primary | ICD-10-CM

## 2020-12-08 DIAGNOSIS — R05.9 COUGH: ICD-10-CM

## 2020-12-08 DIAGNOSIS — R51.9 NONINTRACTABLE HEADACHE, UNSPECIFIED CHRONICITY PATTERN, UNSPECIFIED HEADACHE TYPE: ICD-10-CM

## 2020-12-08 LAB — SARS-COV-2 RDRP RESP QL NAA+PROBE: NEGATIVE

## 2020-12-08 PROCEDURE — U0002 COVID-19 LAB TEST NON-CDC: HCPCS | Mod: ER

## 2020-12-08 PROCEDURE — 99284 EMERGENCY DEPT VISIT MOD MDM: CPT | Mod: ER

## 2020-12-08 RX ORDER — PROMETHAZINE HYDROCHLORIDE AND DEXTROMETHORPHAN HYDROBROMIDE 6.25; 15 MG/5ML; MG/5ML
5 SYRUP ORAL EVERY 6 HOURS PRN
Qty: 118 ML | Refills: 0 | Status: SHIPPED | OUTPATIENT
Start: 2020-12-08 | End: 2020-12-18

## 2020-12-08 RX ORDER — METOCLOPRAMIDE 10 MG/1
10 TABLET ORAL EVERY 6 HOURS
Qty: 30 TABLET | Refills: 0 | Status: SHIPPED | OUTPATIENT
Start: 2020-12-08

## 2020-12-08 NOTE — ED PROVIDER NOTES
HISTORY     Chief Complaint   Patient presents with    COVID-19 Concerns     sat started with sneezing , cough and headache. granddaughter positive with covid         Review of patient's allergies indicates:  No Known Allergies     HPI   The history is provided by the patient. No  was used.        Pt reports being exposed to Covid-19. Pt reports the following symptoms: sneezing, cough, and headache. Pt denies any of the following: CP, SOB, fever, NVD, abdominal pain or any other symptoms at this time.     PCP: Vahe Hawkins MD     Past Medical History:  Past Medical History:   Diagnosis Date    Arthritis     Asthma     DVT (deep venous thrombosis)     left popliteal    GERD (gastroesophageal reflux disease)     Hyperlipidemia     Hypertension     Insomnia 6/13/2013    Knee pain     Obesity (BMI 35.0-39.9 without comorbidity) 2/20/2017        Past Surgical History:  Past Surgical History:   Procedure Laterality Date    APPENDECTOMY      BLADDER REPAIR      HERNIA REPAIR      JOINT REPLACEMENT      KNEE SURGERY      right    ROBOT-ASSISTED REPAIR OF INCISIONAL HERNIA USING DA MIRIAN XI N/A 10/22/2018    Procedure: XI ROBOTIC REPAIR, HERNIA, INCISIONAL;  Surgeon: Skyler Goddard MD;  Location: [facility];  Service: General;  Laterality: N/A;    TUBAL LIGATION      TYMPANIC MEMBRANE REPAIR          Family History:  Family History   Problem Relation Age of Onset    Heart disease Mother     Hypertension Mother     Diabetes Mother     No Known Problems Father         unknown who he was per pt    Breast cancer Neg Hx     Colon cancer Neg Hx     Ovarian cancer Neg Hx     Thrombosis Neg Hx         Social History:  Social History     Tobacco Use    Smoking status: Never Smoker    Smokeless tobacco: Never Used   Substance and Sexual Activity    Alcohol use: No    Drug use: No    Sexual activity: Yes     Partners: Male     Comment: mut monog         ROS   Review of Systems  "  Constitutional: Negative for fever.   HENT: Positive for sneezing. Negative for sore throat.    Respiratory: Positive for cough. Negative for shortness of breath.    Cardiovascular: Negative for chest pain.   Gastrointestinal: Negative for nausea.   Genitourinary: Negative for dysuria.   Musculoskeletal: Negative for back pain.   Skin: Negative for rash.   Neurological: Positive for headaches. Negative for weakness.   Hematological: Does not bruise/bleed easily.       PHYSICAL EXAM     Initial Vitals [12/08/20 1357]   BP Pulse Resp Temp SpO2   (!) 182/90 75 20 98.1 °F (36.7 °C) 96 %      MAP       --           Physical Exam     Nursing Notes and Vital Signs Reviewed.  Constitutional: Patient is in no acute distress.   Pulmonary/Chest: No respiratory distress.   Musculoskeletal: Moves all extremities.   Neurological:  Alert, awake, and appropriate.  Normal speech.    Psychiatric: Normal affect. Good eye contact. Appropriate in content.      ED COURSE   Procedures  ED ONGOING VITALS:  Vitals:    12/08/20 1357   BP: (!) 182/90   Pulse: 75   Resp: 20   Temp: 98.1 °F (36.7 °C)   TempSrc: Oral   SpO2: 96%   Weight: 98.6 kg (217 lb 6 oz)   Height: 5' 2" (1.575 m)         ABNORMAL LAB VALUES:  Labs Reviewed   SARS-COV-2 RNA AMPLIFICATION, QUAL         ALL LAB VALUES:  Results for orders placed or performed during the hospital encounter of 12/08/20   COVID-19 Rapid Screening   Result Value Ref Range    SARS-CoV-2 RNA, Amplification, Qual Negative Negative           RADIOLOGY STUDIES:  Imaging Results    None                   The above vital signs and test results have been reviewed by the emergency provider.     ED Medications:  Current Discharge Medication List        Discharge Medications:  New Prescriptions    METOCLOPRAMIDE HCL (REGLAN) 10 MG TABLET    Take 1 tablet (10 mg total) by mouth every 6 (six) hours.    PROMETHAZINE-DEXTROMETHORPHAN (PROMETHAZINE-DM) 6.25-15 MG/5 ML SYRP    Take 5 mLs by mouth every 6 (six) " hours as needed.      Follow-up Information     Vahe Hawkins MD.    Specialty: Internal Medicine  Why: As needed  Contact information:  [facility] [address] [facility] [address]  [phone]                  3:10 PM    I discussed with patient and/or family/caretaker that evaluation in the ED does not suggest any emergent or life threatening medical conditions requiring immediate intervention beyond what was provided in the ED, and I believe patient is safe for discharge. Regardless, an unremarkable evaluation in the ED does not preclude the development or presence of a serious or life threatening condition. As such, patient was instructed to return immediately for any worsening or change in current symptoms.        MEDICAL DECISION MAKING                 CLINICAL IMPRESSION       ICD-10-CM ICD-9-CM   1. COVID-19 virus not detected  Z03.818 V71.83   2. Nonintractable headache, unspecified chronicity pattern, unspecified headache type  R51.9 784.0   3. Cough  R05 786.2       Disposition:   Disposition: Discharged  Condition: Stable         Avinash Edmondson NP  12/08/20 1511

## 2020-12-08 NOTE — ED NOTES
Aaox3, skin warm and dry, resp unlabored and even.amb with steady gait and thomas well. C/o cough, sneezing and headache since sat. Family member positive covid.

## 2022-09-03 NOTE — CONSULTS
Consulted by ER for admission by Dr. Lui. Discussed case and reviewed Er record and Ct report. Discussed with Dr. Pisano, who recommends checking Lactic acid and patient be admitted under surgery's care and consult HM if need for medical management of chronic conditions.    98.1

## 2022-11-14 ENCOUNTER — HOSPITAL ENCOUNTER (OUTPATIENT)
Dept: RADIOLOGY | Facility: HOSPITAL | Age: 67
Discharge: HOME | End: 2022-11-14
Attending: INTERNAL MEDICINE
Payer: OTHER GOVERNMENT

## 2022-11-14 VITALS — WEIGHT: 217.38 LBS | HEIGHT: 62 IN | BODY MASS INDEX: 40 KG/M2

## 2022-11-14 DIAGNOSIS — Z12.31 VISIT FOR SCREENING MAMMOGRAM: ICD-10-CM

## 2022-11-14 PROCEDURE — 77067 MAMMO DIGITAL SCREENING BILAT WITH TOMO: ICD-10-PCS | Mod: 26,,, | Performed by: RADIOLOGY

## 2022-11-14 PROCEDURE — 77063 MAMMO DIGITAL SCREENING BILAT WITH TOMO: ICD-10-PCS | Mod: 26,,, | Performed by: RADIOLOGY

## 2022-11-14 PROCEDURE — 77063 BREAST TOMOSYNTHESIS BI: CPT | Mod: TC,PO

## 2022-11-14 PROCEDURE — 77067 SCR MAMMO BI INCL CAD: CPT | Mod: TC,PO

## 2022-11-14 PROCEDURE — 77063 BREAST TOMOSYNTHESIS BI: CPT | Mod: 26,,, | Performed by: RADIOLOGY

## 2022-11-14 PROCEDURE — 77067 SCR MAMMO BI INCL CAD: CPT | Mod: 26,,, | Performed by: RADIOLOGY

## 2022-11-28 ENCOUNTER — HOSPITAL ENCOUNTER (EMERGENCY)
Facility: HOSPITAL | Age: 67
Discharge: HOME | End: 2022-11-28
Attending: EMERGENCY MEDICINE
Payer: OTHER GOVERNMENT

## 2022-11-28 VITALS
TEMPERATURE: 98 F | OXYGEN SATURATION: 97 % | DIASTOLIC BLOOD PRESSURE: 79 MMHG | RESPIRATION RATE: 20 BRPM | HEART RATE: 84 BPM | WEIGHT: 199.31 LBS | SYSTOLIC BLOOD PRESSURE: 149 MMHG | BODY MASS INDEX: 36.45 KG/M2

## 2022-11-28 DIAGNOSIS — Z20.822 CLOSE EXPOSURE TO COVID-19 VIRUS: ICD-10-CM

## 2022-11-28 DIAGNOSIS — M25.559 PAIN, HIP: Primary | ICD-10-CM

## 2022-11-28 LAB
CTP QC/QA: YES
SARS-COV-2 RDRP RESP QL NAA+PROBE: NEGATIVE

## 2022-11-28 PROCEDURE — 99283 EMERGENCY DEPT VISIT LOW MDM: CPT | Mod: ER

## 2022-11-28 PROCEDURE — 87635 SARS-COV-2 COVID-19 AMP PRB: CPT | Mod: ER | Performed by: EMERGENCY MEDICINE

## 2022-11-28 RX ORDER — NAPROXEN 375 MG/1
375 TABLET ORAL 2 TIMES DAILY WITH MEALS
Qty: 30 TABLET | Refills: 0 | Status: SHIPPED | OUTPATIENT
Start: 2022-11-28 | End: 2023-12-15 | Stop reason: CLARIF

## 2022-11-28 NOTE — ED PROVIDER NOTES
Encounter Date: 11/28/2022       History     Chief Complaint   Patient presents with    Hip Pain     JIN hip pain that radiates down both legs. Denies injury;    COVID-19 Concerns     Exposed to grandchildren, decreased appetite.      The history is provided by the patient.   Hip Pain  This is a new problem. The current episode started 2 days ago. The problem occurs constantly. The problem has not changed since onset.Pertinent negatives include no chest pain, no abdominal pain, no headaches and no shortness of breath. Nothing aggravates the symptoms. Nothing relieves the symptoms.   Review of patient's allergies indicates:  No Known Allergies  Past Medical History:   Diagnosis Date    Arthritis     Asthma     DVT (deep venous thrombosis)     left popliteal    GERD (gastroesophageal reflux disease)     Hyperlipidemia     Hypertension     Insomnia 6/13/2013    Knee pain     Obesity (BMI 35.0-39.9 without comorbidity) 2/20/2017     Past Surgical History:   Procedure Laterality Date    APPENDECTOMY      BLADDER REPAIR      HERNIA REPAIR      JOINT REPLACEMENT      KNEE SURGERY      right    ROBOT-ASSISTED REPAIR OF INCISIONAL HERNIA USING DA MIRIAN XI N/A 10/22/2018    Procedure: XI ROBOTIC REPAIR, HERNIA, INCISIONAL;  Surgeon: Skyler Goddard MD;  Location: [facility];  Service: General;  Laterality: N/A;    TUBAL LIGATION      TYMPANIC MEMBRANE REPAIR       Family History   Problem Relation Age of Onset    Heart disease Mother     Hypertension Mother     Diabetes Mother     No Known Problems Father         unknown who he was per pt    Breast cancer Neg Hx     Colon cancer Neg Hx     Ovarian cancer Neg Hx     Thrombosis Neg Hx      Social History     Tobacco Use    Smoking status: Never    Smokeless tobacco: Never   Substance Use Topics    Alcohol use: No    Drug use: No     Review of Systems   Constitutional:  Negative for fever.   HENT:  Positive for congestion and sore throat.    Respiratory:  Positive for cough. Negative  for shortness of breath.    Cardiovascular:  Negative for chest pain.   Gastrointestinal:  Negative for abdominal pain and nausea.   Genitourinary:  Negative for dysuria.   Musculoskeletal:  Negative for back pain.   Skin:  Negative for rash.   Neurological:  Negative for weakness and headaches.   Hematological:  Does not bruise/bleed easily.     Physical Exam     Initial Vitals [11/28/22 0811]   BP Pulse Resp Temp SpO2   (!) 149/79 84 20 97.9 °F (36.6 °C) 97 %      MAP       --         Physical Exam    Nursing note and vitals reviewed.  Constitutional: She appears well-developed and well-nourished. No distress.   HENT:   Head: Normocephalic and atraumatic.   Mouth/Throat: Oropharynx is clear and moist.   Eyes: Conjunctivae and EOM are normal. Pupils are equal, round, and reactive to light.   Neck: Neck supple.   Normal range of motion.  Cardiovascular:  Normal rate, regular rhythm and normal heart sounds.     Exam reveals no gallop and no friction rub.       No murmur heard.  Pulmonary/Chest: Breath sounds normal. No respiratory distress. She has no wheezes. She has no rhonchi. She has no rales.   Abdominal: Abdomen is soft. Bowel sounds are normal. She exhibits no distension and no mass. There is no abdominal tenderness. There is no rebound and no guarding.   Musculoskeletal:         General: No tenderness or edema. Normal range of motion.      Cervical back: Normal range of motion and neck supple.     Neurological: She is alert and oriented to person, place, and time. She has normal strength.   Skin: Skin is warm and dry. No rash noted.   Psychiatric: She has a normal mood and affect. Thought content normal.       ED Course   Procedures  Labs Reviewed   SARS-COV-2 RDRP GENE    Narrative:     This test utilizes isothermal nucleic acid amplification technology to detect the SARS-CoV-2 RdRp nucleic acid segment. The analytical sensitivity (limit of detection) is 500 copies/swab.     A POSITIVE result is indicative  of the presence of SARS-CoV-2 RNA; clinical correlation with patient history and other diagnostic information is necessary to determine patient infection status.    A NEGATIVE result means that SARS-CoV-2 nucleic acids are not present above the limit of detection. A NEGATIVE result should be treated as presumptive. It does not rule out the possibility of COVID-19 and should not be the sole basis for treatment decisions. If COVID-19 is strongly suspected based on clinical and exposure history, re-testing using an alternate molecular assay should be considered.     This test is only for use under the Food and Drug Administration s Emergency Use Authorization (EUA).     Commercial kits are provided by [employer]. Performance characteristics of the EUA have been independently verified by [facility] Department of Pathology and Laboratory Medicine.   _________________________________________________________________   The authorized Fact Sheet for Healthcare Providers and the authorized Fact Sheet for Patients of the ID NOW COVID-19 are available on the FDA website:    https://www.fda.gov/media/406308/download      https://www.fda.gov/media/726258/download             Imaging Results              X-Ray Pelvis Routine AP (Final result)  Result time 11/28/22 08:49:09   Procedure changed from X-Ray Pelvis Complete min 3 views     Final result by Efrain Cooper MD (11/28/22 08:49:09)                   Impression:      No acute abnormality.  Mild-to-moderate bilateral degenerative change of the hip joints, right worse than left.      Electronically signed by: Efrain Cooper  Date:    11/28/2022  Time:    08:49               Narrative:    EXAMINATION:  XR PELVIS ROUTINE AP    CLINICAL HISTORY:  pain;  Pain in unspecified hip    TECHNIQUE:  AP view of the pelvis was performed.    COMPARISON:  None.    FINDINGS:  No evidence of fracture or dislocation.  SI joints unremarkable.  Mild discogenic degenerative change  lower lumbar spine.  Soft tissues unremarkable.  Moderate volume stool throughout the colon.                                       Medications - No data to display                           Clinical Impression:   Final diagnoses:  [M25.559] Pain, hip (Primary)  [Z20.822] Close exposure to COVID-19 virus        ED Disposition Condition    Discharge Stable          ED Prescriptions       Medication Sig Dispense Start Date End Date Auth. Provider    naproxen (NAPROSYN) 375 MG tablet Take 1 tablet (375 mg total) by mouth 2 (two) times daily with meals. 30 tablet 11/28/2022 -- Chang Hurtado MD          Follow-up Information       Follow up With Specialties Details Why Contact Info    Vahe Hawkins MD Pathology   [address] [facility] [address]  [facility] [address]  [phone]               Chang Hurtado MD  11/28/22 0900

## 2023-02-23 ENCOUNTER — HOSPITAL ENCOUNTER (OUTPATIENT)
Dept: RADIOLOGY | Facility: HOSPITAL | Age: 68
Discharge: HOME | End: 2023-02-23
Attending: INTERNAL MEDICINE
Payer: OTHER GOVERNMENT

## 2023-02-23 DIAGNOSIS — N95.0 POSTMENOPAUSAL BLEEDING: ICD-10-CM

## 2023-02-23 PROCEDURE — 74176 CT ABDOMEN PELVIS WITHOUT CONTRAST: ICD-10-PCS | Mod: 26,,, | Performed by: RADIOLOGY

## 2023-02-23 PROCEDURE — 74176 CT ABD & PELVIS W/O CONTRAST: CPT | Mod: TC,PO

## 2023-02-23 PROCEDURE — 74176 CT ABD & PELVIS W/O CONTRAST: CPT | Mod: 26,,, | Performed by: RADIOLOGY

## 2023-02-27 ENCOUNTER — OFFICE VISIT (OUTPATIENT)
Dept: OBSTETRICS AND GYNECOLOGY | Facility: CLINIC | Age: 68
End: 2023-02-27
Payer: OTHER GOVERNMENT

## 2023-02-27 VITALS
DIASTOLIC BLOOD PRESSURE: 78 MMHG | WEIGHT: 196.44 LBS | HEIGHT: 62 IN | SYSTOLIC BLOOD PRESSURE: 150 MMHG | BODY MASS INDEX: 36.15 KG/M2

## 2023-02-27 DIAGNOSIS — N90.89 VULVAR IRRITATION: ICD-10-CM

## 2023-02-27 DIAGNOSIS — N95.0 POSTMENOPAUSAL BLEEDING: Primary | ICD-10-CM

## 2023-02-27 DIAGNOSIS — Z12.4 PAPANICOLAOU SMEAR FOR CERVICAL CANCER SCREENING: ICD-10-CM

## 2023-02-27 PROCEDURE — 58100 ENDOMETRIAL BIOPSY: ICD-10-PCS | Mod: S$GLB,,, | Performed by: NURSE PRACTITIONER

## 2023-02-27 PROCEDURE — 81025 PR  URINE PREGNANCY TEST: ICD-10-PCS | Mod: S$GLB,,, | Performed by: NURSE PRACTITIONER

## 2023-02-27 PROCEDURE — 88305 TISSUE EXAM BY PATHOLOGIST: CPT | Performed by: PATHOLOGY

## 2023-02-27 PROCEDURE — 99999 PR PBB SHADOW E&M-EST. PATIENT-LVL III: ICD-10-PCS | Mod: PBBFAC,,, | Performed by: NURSE PRACTITIONER

## 2023-02-27 PROCEDURE — 58100 BIOPSY OF UTERUS LINING: CPT | Mod: S$GLB,,, | Performed by: NURSE PRACTITIONER

## 2023-02-27 PROCEDURE — 88305 TISSUE EXAM BY PATHOLOGIST: CPT | Mod: 26,,, | Performed by: PATHOLOGY

## 2023-02-27 PROCEDURE — 88305 TISSUE EXAM BY PATHOLOGIST: ICD-10-PCS | Mod: 26,,, | Performed by: PATHOLOGY

## 2023-02-27 PROCEDURE — 87624 HPV HI-RISK TYP POOLED RSLT: CPT | Performed by: NURSE PRACTITIONER

## 2023-02-27 PROCEDURE — 81025 URINE PREGNANCY TEST: CPT | Mod: S$GLB,,, | Performed by: NURSE PRACTITIONER

## 2023-02-27 PROCEDURE — 88175 CYTOPATH C/V AUTO FLUID REDO: CPT | Performed by: PATHOLOGY

## 2023-02-27 PROCEDURE — 99204 OFFICE O/P NEW MOD 45 MIN: CPT | Mod: 25,S$GLB,, | Performed by: NURSE PRACTITIONER

## 2023-02-27 PROCEDURE — 99204 PR OFFICE/OUTPT VISIT, NEW, LEVL IV, 45-59 MIN: ICD-10-PCS | Mod: 25,S$GLB,, | Performed by: NURSE PRACTITIONER

## 2023-02-27 PROCEDURE — 88141 PR  CYTOPATH CERV/VAG INTERPRET: ICD-10-PCS | Mod: ,,, | Performed by: PATHOLOGY

## 2023-02-27 PROCEDURE — 99999 PR PBB SHADOW E&M-EST. PATIENT-LVL III: CPT | Mod: PBBFAC,,, | Performed by: NURSE PRACTITIONER

## 2023-02-27 PROCEDURE — 88141 CYTOPATH C/V INTERPRET: CPT | Mod: ,,, | Performed by: PATHOLOGY

## 2023-02-27 RX ORDER — CLOTRIMAZOLE AND BETAMETHASONE DIPROPIONATE 10; .64 MG/G; MG/G
CREAM TOPICAL
Qty: 45 G | Refills: 1 | Status: SHIPPED | OUTPATIENT
Start: 2023-02-27 | End: 2024-02-27

## 2023-02-27 NOTE — PROGRESS NOTES
Subjective:       Patient ID: Yaneth Fabian is a 67 y.o. female.    Chief Complaint:  Vaginal Bleeding    Patient's last menstrual period was 2017 (exact date).  History of Present Illness    Presents today for postmenopausal bleeding   Has not had a cycle in 15 years   Out of nowhere she bleed the whole month of November   5 day cycle in December   In January she would see dark brown spotting on tissue after urinating     Complains of vulva itching and irritation   OB History    Para Term  AB Living   7 4 4   3 4   SAB IAB Ectopic Multiple Live Births   3              # Outcome Date GA Lbr Estuardo/2nd Weight Sex Delivery Anes PTL Lv   7 SAB            6 SAB            5 SAB            4 Term            3 Term            2 Term            1 Term                Review of Systems  Review of Systems        Objective:    Physical Exam  Genitourinary:     General: Normal vulva.      Exam position: Lithotomy position.      Vagina: No signs of injury and foreign body. No vaginal discharge, erythema, tenderness, bleeding, lesions or prolapsed vaginal walls.      Cervix: No cervical motion tenderness, discharge, friability, lesion, erythema, cervical bleeding or eversion.      Uterus: Not deviated, not enlarged, not fixed, not tender and no uterine prolapse.       Adnexa:         Right: No mass, tenderness or fullness.          Left: No mass, tenderness or fullness.         Vulva WNL   Assessment:     1. Postmenopausal bleeding    2. Papanicolaou smear for cervical cancer screening    3. Vulvar irritation              Plan:   Yaneth was seen today for vaginal bleeding.    Diagnoses and all orders for this visit:    Postmenopausal bleeding  -     US Pelvis Complete Non OB; Future  -     Specimen to Pathology, Ob/Gyn    Papanicolaou smear for cervical cancer screening  -     Liquid-Based Pap Smear, Screening  -     HPV High Risk Genotypes, PCR    Vulvar irritation  -     clotrimazole-betamethasone 1-0.05%  (LOTRISONE) cream; Apply to affected area 2 times daily    2/8/2023 CBC 14.6/44.2; TSH 2.290 (Dr. Hawkins office)   Refer to GYN MD for  eval and treatment

## 2023-02-27 NOTE — PROCEDURES
Endometrial biopsy    Date/Time: 2/27/2023 10:15 AM  Performed by: Hannah Connolly NP  Authorized by: Hannah Connolly NP     Consent:     Consent obtained:  Verbal    Consent given by:  Patient    Patient questions answered: yes      Patient agrees, verbalizes understanding, and wants to proceed: yes      Educational handouts given: yes    Indication:     Indications: Post-menopausal bleeding      Chronicity of post-menopausal bleeding:  New    Progression of post-menopausal bleeding:  Worsening  Pre-procedure:     Pre-procedure timeout performed: yes    Procedure:     Procedure: endometrial biopsy with Pipelle      Cervix cleaned and prepped: yes      A paracervical block was performed: no      An intracervical block was performed: no      The cervix was dilated: no      Specimen collected: specimen collected and sent to pathology      Patient tolerated procedure well with no complications: yes    Comments:     Procedure comments:  Successful EMB

## 2023-02-28 ENCOUNTER — HOSPITAL ENCOUNTER (OUTPATIENT)
Dept: RADIOLOGY | Facility: HOSPITAL | Age: 68
Discharge: HOME | End: 2023-02-28
Attending: NURSE PRACTITIONER
Payer: OTHER GOVERNMENT

## 2023-02-28 DIAGNOSIS — N95.0 POSTMENOPAUSAL BLEEDING: ICD-10-CM

## 2023-02-28 PROCEDURE — 76856 US EXAM PELVIC COMPLETE: CPT | Mod: 26,,, | Performed by: STUDENT IN AN ORGANIZED HEALTH CARE EDUCATION/TRAINING PROGRAM

## 2023-02-28 PROCEDURE — 76856 US EXAM PELVIC COMPLETE: CPT | Mod: TC,PO

## 2023-02-28 PROCEDURE — 76856 US PELVIS COMPLETE NON OB: ICD-10-PCS | Mod: 26,,, | Performed by: STUDENT IN AN ORGANIZED HEALTH CARE EDUCATION/TRAINING PROGRAM

## 2023-03-02 LAB
FINAL PATHOLOGIC DIAGNOSIS: NORMAL
GROSS: NORMAL
HPV HR 12 DNA SPEC QL NAA+PROBE: NEGATIVE
HPV16 AG SPEC QL: NEGATIVE
HPV18 DNA SPEC QL NAA+PROBE: NEGATIVE
Lab: NORMAL

## 2023-03-03 LAB
FINAL PATHOLOGIC DIAGNOSIS: NORMAL
Lab: NORMAL

## 2023-10-08 ENCOUNTER — HOSPITAL ENCOUNTER (EMERGENCY)
Facility: HOSPITAL | Age: 68
Discharge: PSYCHIATRIC OR SUBSTANCE USE FACILITY | End: 2023-10-09
Attending: EMERGENCY MEDICINE
Payer: OTHER GOVERNMENT

## 2023-10-08 DIAGNOSIS — T50.902A INTENTIONAL OVERDOSE, INITIAL ENCOUNTER: Primary | ICD-10-CM

## 2023-10-08 DIAGNOSIS — F32.A DEPRESSION, UNSPECIFIED DEPRESSION TYPE: ICD-10-CM

## 2023-10-08 DIAGNOSIS — R45.851 SUICIDAL IDEATION: ICD-10-CM

## 2023-10-08 DIAGNOSIS — T14.91XA SUICIDE ATTEMPT: ICD-10-CM

## 2023-10-08 DIAGNOSIS — I10 HYPERTENSION: ICD-10-CM

## 2023-10-08 LAB
ALBUMIN SERPL BCP-MCNC: 3.6 G/DL (ref 3.5–5.2)
ALP SERPL-CCNC: 76 U/L (ref 55–135)
ALT SERPL W/O P-5'-P-CCNC: 13 U/L (ref 10–44)
AMPHET+METHAMPHET UR QL: NEGATIVE
ANION GAP SERPL CALC-SCNC: 10 MMOL/L (ref 8–16)
APAP SERPL-MCNC: <3 UG/ML (ref 10–20)
AST SERPL-CCNC: 16 U/L (ref 10–40)
BARBITURATES UR QL SCN>200 NG/ML: NEGATIVE
BASOPHILS # BLD AUTO: 0.02 K/UL (ref 0–0.2)
BASOPHILS NFR BLD: 0.3 % (ref 0–1.9)
BENZODIAZ UR QL SCN>200 NG/ML: NEGATIVE
BILIRUB SERPL-MCNC: 0.5 MG/DL (ref 0.1–1)
BILIRUB UR QL STRIP: NEGATIVE
BUN SERPL-MCNC: 10 MG/DL (ref 8–23)
BZE UR QL SCN: NEGATIVE
CALCIUM SERPL-MCNC: 9 MG/DL (ref 8.7–10.5)
CANNABINOIDS UR QL SCN: NEGATIVE
CHLORIDE SERPL-SCNC: 107 MMOL/L (ref 95–110)
CLARITY UR REFRACT.AUTO: CLEAR
CO2 SERPL-SCNC: 24 MMOL/L (ref 23–29)
COLOR UR AUTO: YELLOW
CREAT SERPL-MCNC: 0.9 MG/DL (ref 0.5–1.4)
CREAT UR-MCNC: 100.6 MG/DL (ref 15–325)
DIFFERENTIAL METHOD: ABNORMAL
EOSINOPHIL # BLD AUTO: 0.1 K/UL (ref 0–0.5)
EOSINOPHIL NFR BLD: 0.9 % (ref 0–8)
ERYTHROCYTE [DISTWIDTH] IN BLOOD BY AUTOMATED COUNT: 12.5 % (ref 11.5–14.5)
EST. GFR  (NO RACE VARIABLE): >60 ML/MIN/1.73 M^2
ETHANOL SERPL-MCNC: <10 MG/DL
GLUCOSE SERPL-MCNC: 141 MG/DL (ref 70–110)
GLUCOSE UR QL STRIP: NEGATIVE
HCT VFR BLD AUTO: 38.9 % (ref 37–48.5)
HGB BLD-MCNC: 13.3 G/DL (ref 12–16)
HGB UR QL STRIP: NEGATIVE
IMM GRANULOCYTES # BLD AUTO: 0.01 K/UL (ref 0–0.04)
IMM GRANULOCYTES NFR BLD AUTO: 0.2 % (ref 0–0.5)
KETONES UR QL STRIP: NEGATIVE
LEUKOCYTE ESTERASE UR QL STRIP: NEGATIVE
LYMPHOCYTES # BLD AUTO: 2.1 K/UL (ref 1–4.8)
LYMPHOCYTES NFR BLD: 32.2 % (ref 18–48)
MCH RBC QN AUTO: 31.4 PG (ref 27–31)
MCHC RBC AUTO-ENTMCNC: 34.2 G/DL (ref 32–36)
MCV RBC AUTO: 92 FL (ref 82–98)
METHADONE UR QL SCN>300 NG/ML: NEGATIVE
MONOCYTES # BLD AUTO: 0.6 K/UL (ref 0.3–1)
MONOCYTES NFR BLD: 9.3 % (ref 4–15)
NEUTROPHILS # BLD AUTO: 3.7 K/UL (ref 1.8–7.7)
NEUTROPHILS NFR BLD: 57.1 % (ref 38–73)
NITRITE UR QL STRIP: NEGATIVE
NRBC BLD-RTO: 0 /100 WBC
OPIATES UR QL SCN: ABNORMAL
PCP UR QL SCN>25 NG/ML: NEGATIVE
PH UR STRIP: 7 [PH] (ref 5–8)
PLATELET # BLD AUTO: 151 K/UL (ref 150–450)
PMV BLD AUTO: 11.4 FL (ref 9.2–12.9)
POCT GLUCOSE: 117 MG/DL (ref 70–110)
POTASSIUM SERPL-SCNC: 3.7 MMOL/L (ref 3.5–5.1)
PROT SERPL-MCNC: 7.1 G/DL (ref 6–8.4)
PROT UR QL STRIP: NEGATIVE
RBC # BLD AUTO: 4.24 M/UL (ref 4–5.4)
SODIUM SERPL-SCNC: 141 MMOL/L (ref 136–145)
SP GR UR STRIP: 1.01 (ref 1–1.03)
TOXICOLOGY INFORMATION: ABNORMAL
TSH SERPL DL<=0.005 MIU/L-ACNC: 1.77 UIU/ML (ref 0.4–4)
URN SPEC COLLECT METH UR: ABNORMAL
UROBILINOGEN UR STRIP-ACNC: ABNORMAL EU/DL
WBC # BLD AUTO: 6.45 K/UL (ref 3.9–12.7)

## 2023-10-08 PROCEDURE — 82077 ASSAY SPEC XCP UR&BREATH IA: CPT | Mod: ER | Performed by: EMERGENCY MEDICINE

## 2023-10-08 PROCEDURE — 80307 DRUG TEST PRSMV CHEM ANLYZR: CPT | Mod: ER | Performed by: EMERGENCY MEDICINE

## 2023-10-08 PROCEDURE — 82962 GLUCOSE BLOOD TEST: CPT | Mod: ER

## 2023-10-08 PROCEDURE — 84443 ASSAY THYROID STIM HORMONE: CPT | Mod: ER | Performed by: EMERGENCY MEDICINE

## 2023-10-08 PROCEDURE — 25000003 PHARM REV CODE 250: Mod: ER | Performed by: EMERGENCY MEDICINE

## 2023-10-08 PROCEDURE — 93010 EKG 12-LEAD: ICD-10-PCS | Mod: ,,, | Performed by: INTERNAL MEDICINE

## 2023-10-08 PROCEDURE — 81003 URINALYSIS AUTO W/O SCOPE: CPT | Mod: ER,59 | Performed by: EMERGENCY MEDICINE

## 2023-10-08 PROCEDURE — 93010 ELECTROCARDIOGRAM REPORT: CPT | Mod: ,,, | Performed by: INTERNAL MEDICINE

## 2023-10-08 PROCEDURE — 99285 EMERGENCY DEPT VISIT HI MDM: CPT | Mod: ER

## 2023-10-08 PROCEDURE — 80053 COMPREHEN METABOLIC PANEL: CPT | Mod: ER | Performed by: EMERGENCY MEDICINE

## 2023-10-08 PROCEDURE — 93005 ELECTROCARDIOGRAM TRACING: CPT | Mod: ER

## 2023-10-08 PROCEDURE — 80143 DRUG ASSAY ACETAMINOPHEN: CPT | Mod: ER | Performed by: EMERGENCY MEDICINE

## 2023-10-08 PROCEDURE — 85025 COMPLETE CBC W/AUTO DIFF WBC: CPT | Mod: ER | Performed by: EMERGENCY MEDICINE

## 2023-10-08 RX ORDER — ACETAMINOPHEN 500 MG
1000 TABLET ORAL
Status: COMPLETED | OUTPATIENT
Start: 2023-10-08 | End: 2023-10-08

## 2023-10-08 RX ADMIN — ACETAMINOPHEN 1000 MG: 500 TABLET ORAL at 11:10

## 2023-10-09 VITALS
RESPIRATION RATE: 19 BRPM | SYSTOLIC BLOOD PRESSURE: 160 MMHG | OXYGEN SATURATION: 94 % | HEART RATE: 70 BPM | BODY MASS INDEX: 35.85 KG/M2 | DIASTOLIC BLOOD PRESSURE: 71 MMHG | WEIGHT: 196 LBS | TEMPERATURE: 98 F

## 2023-10-09 NOTE — ED NOTES
Pt belongings:    Shirt  Pants  Daniela ring  Gold ring  Phone  Hand bag  Assorted cards  Assorted change   [place]    Pt's belongings labeled and secured with sitter

## 2023-10-09 NOTE — ED PROVIDER NOTES
"Encounter Date: 10/8/2023       History     Chief Complaint   Patient presents with    Suicidal     Pt transported to er via [facility] after family called because Pt took 2 clonazepam and 4 Ambien this evening. pt told paramedic that she did not want to live anymore. Recent deaths in family. Pt off of depression meds x months.      The history is provided by the patient.   Mental Health Problem  The primary symptoms include depressed mood, self-injury, suicidal ideas and suicide attempt (pt took 4 tabs of ambien 5mg and 2 tabs of clonazepam about 2 hours pta because she " is tired of living." and she "feels like she wants to give up."). The current episode started 1 to 2 hours ago. This is a new problem.   The onset of the illness is precipitated by stressful event and emotional stress (pt lives c 7 children ranging from 20yo to 2yo and her mother and  have both  within the past year.). Additional symptoms of the illness include psychomotor retardation, feelings of worthlessness and poor judgment. She admits to suicidal ideas. She does have a plan to attempt suicide. She contemplates harming herself. She has already injured self (took ambien and diseased 's clonazepam). She does not contemplate injuring another person. She has not already  injured another person.     Review of patient's allergies indicates:  No Known Allergies  Past Medical History:   Diagnosis Date    Arthritis     Asthma     DVT (deep venous thrombosis)     left popliteal    GERD (gastroesophageal reflux disease)     Hyperlipidemia     Hypertension     Insomnia 2013    Knee pain     Obesity (BMI 35.0-39.9 without comorbidity) 2017     Past Surgical History:   Procedure Laterality Date    APPENDECTOMY      BLADDER REPAIR      HERNIA REPAIR      JOINT REPLACEMENT      KNEE SURGERY      right    ROBOT-ASSISTED REPAIR OF INCISIONAL HERNIA USING DA MIRIAN XI N/A 10/22/2018    Procedure: XI ROBOTIC REPAIR, HERNIA, INCISIONAL;  " Surgeon: Skyler Goddard MD;  Location: [facility] OR;  Service: General;  Laterality: N/A;    TUBAL LIGATION      TYMPANIC MEMBRANE REPAIR       Family History   Problem Relation Age of Onset    Heart disease Mother     Hypertension Mother     Diabetes Mother     No Known Problems Father         unknown who he was per pt    Breast cancer Neg Hx     Colon cancer Neg Hx     Ovarian cancer Neg Hx     Thrombosis Neg Hx      Social History     Tobacco Use    Smoking status: Never    Smokeless tobacco: Never   Substance Use Topics    Alcohol use: No    Drug use: No     Review of Systems   Constitutional:  Negative for fever.   HENT:  Negative for sore throat.    Respiratory:  Negative for shortness of breath.    Cardiovascular:  Negative for chest pain.   Gastrointestinal:  Negative for nausea.   Genitourinary:  Negative for dysuria.   Musculoskeletal:  Negative for back pain.   Skin:  Negative for rash.   Neurological:  Negative for weakness.   Hematological:  Does not bruise/bleed easily.   Psychiatric/Behavioral:  Positive for self-injury and suicidal ideas.    All other systems reviewed and are negative.      Physical Exam     Initial Vitals [10/08/23 2103]   BP Pulse Resp Temp SpO2   (!) 198/91 92 16 98.2 °F (36.8 °C) 98 %      MAP       --         Physical Exam    Nursing note and vitals reviewed.  Constitutional: She appears well-developed and well-nourished.   HENT:   Head: Normocephalic and atraumatic.   Mouth/Throat: No oropharyngeal exudate.   Eyes: Conjunctivae and EOM are normal. Pupils are equal, round, and reactive to light.   Neck: Neck supple. No thyromegaly present.   Normal range of motion.  Cardiovascular:  Normal rate, regular rhythm, normal heart sounds and intact distal pulses.     Exam reveals no gallop and no friction rub.       No murmur heard.  Pulmonary/Chest: Breath sounds normal. No respiratory distress. She has no wheezes. She has no rhonchi. She exhibits no tenderness.   Abdominal: Abdomen is soft.  Bowel sounds are normal. She exhibits no distension. There is no abdominal tenderness. There is no rebound and no guarding.   Musculoskeletal:         General: No tenderness or edema. Normal range of motion.      Cervical back: Normal range of motion and neck supple.     Lymphadenopathy:     She has no cervical adenopathy.   Neurological: She is alert and oriented to person, place, and time. She has normal strength. No cranial nerve deficit or sensory deficit. GCS score is 15. GCS eye subscore is 4. GCS verbal subscore is 5. GCS motor subscore is 6.   Skin: Skin is warm and dry. Capillary refill takes less than 2 seconds. No rash noted.   Psychiatric: Her speech is normal. She is withdrawn. Cognition and memory are impaired. She expresses impulsivity and inappropriate judgment. She exhibits a depressed mood. She expresses suicidal ideation. She expresses suicidal plans.         ED Course   Procedures  Labs Reviewed   CBC W/ AUTO DIFFERENTIAL - Abnormal; Notable for the following components:       Result Value    MCH 31.4 (*)     All other components within normal limits   COMPREHENSIVE METABOLIC PANEL - Abnormal; Notable for the following components:    Glucose 141 (*)     All other components within normal limits   URINALYSIS, REFLEX TO URINE CULTURE - Abnormal; Notable for the following components:    Urobilinogen, UA 4.0-6.0 (*)     All other components within normal limits    Narrative:     Specimen Source->Urine   DRUG SCREEN PANEL, URINE EMERGENCY - Abnormal; Notable for the following components:    Opiate Scrn, Ur Presumptive Positive (*)     All other components within normal limits    Narrative:     Specimen Source->Urine   ACETAMINOPHEN LEVEL - Abnormal; Notable for the following components:    Acetaminophen (Tylenol), Serum <3.0 (*)     All other components within normal limits   POCT GLUCOSE - Abnormal; Notable for the following components:    POCT Glucose 117 (*)     All other components within normal  limits   TSH   ALCOHOL,MEDICAL (ETHANOL)     EKG Readings: (Independently Interpreted)   Initial Reading: No STEMI. Rhythm: Normal Sinus Rhythm. Heart Rate: 88. Ectopy: No Ectopy. Conduction: RBBB. ST Segments: Normal ST Segments. T Waves: Normal. Axis: Left Axis Deviation. Clinical Impression: Normal Sinus Rhythm with RBBB       Imaging Results    None          Medications   acetaminophen tablet 1,000 mg (has no administration in time range)     Medical Decision Making  Amount and/or Complexity of Data Reviewed  Labs: ordered.    Risk  OTC drugs.                  Vitals:    10/08/23 2103 10/08/23 2118 10/08/23 2133 10/08/23 2148   BP: (!) 198/91 (!) 210/93 (!) 196/83 (!) 173/74   Pulse: 92 95 91 86   Resp: 16 (!) 65 (!) 22 20   Temp: 98.2 °F (36.8 °C)      TempSrc: Oral      SpO2: 98% 97% 97% 95%   Weight: 88.9 kg (196 lb)       10/08/23 2203 10/08/23 2218 10/08/23 2233   BP: (!) 166/73 (!) 164/73 (!) 162/74   Pulse: 83 88 79   Resp: (!) 21 (!) 23 20   Temp:      TempSrc:      SpO2: 95% 96% 96%   Weight:          Results for orders placed or performed during the hospital encounter of 10/08/23   CBC auto differential   Result Value Ref Range    WBC 6.45 3.90 - 12.70 K/uL    RBC 4.24 4.00 - 5.40 M/uL    Hemoglobin 13.3 12.0 - 16.0 g/dL    Hematocrit 38.9 37.0 - 48.5 %    MCV 92 82 - 98 fL    MCH 31.4 (H) 27.0 - 31.0 pg    MCHC 34.2 32.0 - 36.0 g/dL    RDW 12.5 11.5 - 14.5 %    Platelets 151 150 - 450 K/uL    MPV 11.4 9.2 - 12.9 fL    Immature Granulocytes 0.2 0.0 - 0.5 %    Gran # (ANC) 3.7 1.8 - 7.7 K/uL    Immature Grans (Abs) 0.01 0.00 - 0.04 K/uL    Lymph # 2.1 1.0 - 4.8 K/uL    Mono # 0.6 0.3 - 1.0 K/uL    Eos # 0.1 0.0 - 0.5 K/uL    Baso # 0.02 0.00 - 0.20 K/uL    nRBC 0 0 /100 WBC    Gran % 57.1 38.0 - 73.0 %    Lymph % 32.2 18.0 - 48.0 %    Mono % 9.3 4.0 - 15.0 %    Eosinophil % 0.9 0.0 - 8.0 %    Basophil % 0.3 0.0 - 1.9 %    Differential Method Automated    Comprehensive metabolic panel   Result Value Ref  Range    Sodium 141 136 - 145 mmol/L    Potassium 3.7 3.5 - 5.1 mmol/L    Chloride 107 95 - 110 mmol/L    CO2 24 23 - 29 mmol/L    Glucose 141 (H) 70 - 110 mg/dL    BUN 10 8 - 23 mg/dL    Creatinine 0.9 0.5 - 1.4 mg/dL    Calcium 9.0 8.7 - 10.5 mg/dL    Total Protein 7.1 6.0 - 8.4 g/dL    Albumin 3.6 3.5 - 5.2 g/dL    Total Bilirubin 0.5 0.1 - 1.0 mg/dL    Alkaline Phosphatase 76 55 - 135 U/L    AST 16 10 - 40 U/L    ALT 13 10 - 44 U/L    eGFR >60.0 >60 mL/min/1.73 m^2    Anion Gap 10 8 - 16 mmol/L   TSH   Result Value Ref Range    TSH 1.772 0.400 - 4.000 uIU/mL   Urinalysis, Reflex to Urine Culture Urine, Clean Catch    Specimen: Urine   Result Value Ref Range    Specimen UA Urine, Clean Catch     Color, UA Yellow Yellow, Straw, [place]    Appearance, UA Clear Clear    pH, UA 7.0 5.0 - 8.0    Specific Gravity, UA 1.015 1.005 - 1.030    Protein, UA Negative Negative    Glucose, UA Negative Negative    Ketones, UA Negative Negative    Bilirubin (UA) Negative Negative    Occult Blood UA Negative Negative    Nitrite, UA Negative Negative    Urobilinogen, UA 4.0-6.0 (A) <2.0 EU/dL    Leukocytes, UA Negative Negative   Drug screen panel, emergency   Result Value Ref Range    Benzodiazepines Negative Negative    Methadone metabolites Negative Negative    Cocaine (Metab.) Negative Negative    Opiate Scrn, Ur Presumptive Positive (A) Negative    Barbiturate Screen, Ur Negative Negative    Amphetamine Screen, Ur Negative Negative    THC Negative Negative    Phencyclidine Negative Negative    Creatinine, Urine 100.6 15.0 - 325.0 mg/dL    Toxicology Information SEE COMMENT    Ethanol   Result Value Ref Range    Alcohol, Serum <10 <10 mg/dL   Acetaminophen level   Result Value Ref Range    Acetaminophen (Tylenol), Serum <3.0 (L) 10.0 - 20.0 ug/mL   POCT glucose   Result Value Ref Range    POCT Glucose 117 (H) 70 - 110 mg/dL         Imaging Results    None         Medications   acetaminophen tablet 1,000 mg (has no administration  in time range)       9:13 PM PEC. Pt will be PEC'd. Informed patient and family that psychiatric services are not available at this facility. Notified them of the need to transfer to another facility with available services. Pt will need to be medically cleared prior to transfer. They understand and agree with the plan as discussed. Answered any questions at this time.      10:37 PM  - Re-evaluation: The patient is resting comfortably and is in no acute distress. They have been medically cleared and are awaiting placement/transfer to a psychiatric facility for further evaluation. Pt is stable for transfer at this time.     All historical, clinical, and laboratory findings were reviewed with the patient/family in detail along with the indications for transfer to an inpatient psychiatric services as we do not have those services available at this facility.  All remaining questions and concerns were addressed at that time and the patient/family agrees to proceed accordingly.  Similarly all pertinent details of the encounter were discussed with Dr. Walker at  [facility] who agrees to accept the patient in transfer based on the needs/patient preferences outlined above.  Patient will be transferred by [facility] secondary to a need for secure/protective transport given the nature of the patients illness.  Kannan Sanchez MD  1:17 AM           Medication List        ASK your doctor about these medications      ALBUTEROL INHL     benazepriL 10 MG tablet  Commonly known as: LOTENSIN     clotrimazole-betamethasone 1-0.05% cream  Commonly known as: LOTRISONE  Apply to affected area 2 times daily     furosemide 20 MG tablet  Commonly known as: LASIX  Take 1 tablet (20 mg total) by mouth once daily.     HYDROcodone-acetaminophen 5-325 mg per tablet  Commonly known as: NORCO  Take 1 tablet by mouth every 6 (six) hours as needed for Pain.     meloxicam 15 MG tablet  Commonly known as: MOBIC     metoclopramide HCl 10 MG  tablet  Commonly known as: REGLAN  Take 1 tablet (10 mg total) by mouth every 6 (six) hours.     montelukast 10 mg tablet  Commonly known as: SINGULAIR     naproxen 375 MG tablet  Commonly known as: NAPROSYN  Take 1 tablet (375 mg total) by mouth 2 (two) times daily with meals.     pantoprazole 40 MG tablet  Commonly known as: PROTONIX     potassium chloride 10 MEQ Tbsr  Commonly known as: KLOR-CON  Take 1 tablet (10 mEq total) by mouth once daily.     predniSONE 20 MG tablet  Commonly known as: DELTASONE  Take 1 tablet 3 times per day for 3 days, then Take 1 tablet 2 times per day for 3 days, then Take 1 tablet daily for 3 days     zolpidem 5 MG Tab  Commonly known as: AMBIEN  Take 1 tablet (5 mg total) by mouth nightly as needed.             Current Discharge Medication List            ED Diagnosis  1. Intentional overdose, initial encounter    2. Hypertension    3. Suicidal ideation    4. Depression, unspecified depression type    5. Suicide attempt                     Clinical Impression:   Final diagnoses:  [I10] Hypertension  [R45.851] Suicidal ideation  [F32.A] Depression, unspecified depression type  [T14.91XA] Suicide attempt  [T50.902A] Intentional overdose, initial encounter (Primary)        ED Disposition Condition    Transfer to Psych Facility Stable          ED Prescriptions    None       Follow-up Information    None          Kannan Sanchez Jr., MD  10/08/23 1343       Kannan Sanchez Jr., MD  10/09/23 0818

## 2023-12-15 ENCOUNTER — HOSPITAL ENCOUNTER (EMERGENCY)
Facility: HOSPITAL | Age: 68
Discharge: HOME | End: 2023-12-15
Attending: EMERGENCY MEDICINE
Payer: OTHER GOVERNMENT

## 2023-12-15 VITALS
OXYGEN SATURATION: 100 % | WEIGHT: 198.31 LBS | RESPIRATION RATE: 20 BRPM | SYSTOLIC BLOOD PRESSURE: 187 MMHG | BODY MASS INDEX: 36.27 KG/M2 | HEART RATE: 60 BPM | DIASTOLIC BLOOD PRESSURE: 86 MMHG | TEMPERATURE: 98 F

## 2023-12-15 DIAGNOSIS — M54.9 UPPER BACK PAIN: Primary | ICD-10-CM

## 2023-12-15 DIAGNOSIS — R29.6 FALLING: ICD-10-CM

## 2023-12-15 DIAGNOSIS — S49.91XA RIGHT SHOULDER INJURY: ICD-10-CM

## 2023-12-15 DIAGNOSIS — R55 NEAR SYNCOPE: ICD-10-CM

## 2023-12-15 DIAGNOSIS — R53.1 WEAKNESS: ICD-10-CM

## 2023-12-15 LAB
ALBUMIN SERPL BCP-MCNC: 3.7 G/DL (ref 3.5–5.2)
ALP SERPL-CCNC: 75 U/L (ref 55–135)
ALT SERPL W/O P-5'-P-CCNC: 13 U/L (ref 10–44)
AMPHET+METHAMPHET UR QL: NEGATIVE
ANION GAP SERPL CALC-SCNC: 9 MMOL/L (ref 8–16)
APAP SERPL-MCNC: <3 UG/ML (ref 10–20)
AST SERPL-CCNC: 14 U/L (ref 10–40)
BARBITURATES UR QL SCN>200 NG/ML: NEGATIVE
BASOPHILS # BLD AUTO: 0.04 K/UL (ref 0–0.2)
BASOPHILS NFR BLD: 0.4 % (ref 0–1.9)
BENZODIAZ UR QL SCN>200 NG/ML: NEGATIVE
BILIRUB SERPL-MCNC: 0.3 MG/DL (ref 0.1–1)
BNP SERPL-MCNC: 32 PG/ML (ref 0–99)
BUN SERPL-MCNC: 21 MG/DL (ref 8–23)
BZE UR QL SCN: NEGATIVE
CALCIUM SERPL-MCNC: 9.3 MG/DL (ref 8.7–10.5)
CANNABINOIDS UR QL SCN: NEGATIVE
CHLORIDE SERPL-SCNC: 103 MMOL/L (ref 95–110)
CO2 SERPL-SCNC: 26 MMOL/L (ref 23–29)
CREAT SERPL-MCNC: 0.8 MG/DL (ref 0.5–1.4)
CREAT UR-MCNC: 64.1 MG/DL (ref 15–325)
DIFFERENTIAL METHOD: ABNORMAL
EOSINOPHIL # BLD AUTO: 0 K/UL (ref 0–0.5)
EOSINOPHIL NFR BLD: 0.4 % (ref 0–8)
ERYTHROCYTE [DISTWIDTH] IN BLOOD BY AUTOMATED COUNT: 12.7 % (ref 11.5–14.5)
EST. GFR  (NO RACE VARIABLE): >60 ML/MIN/1.73 M^2
ETHANOL SERPL-MCNC: <10 MG/DL
GLUCOSE SERPL-MCNC: 81 MG/DL (ref 70–110)
HCT VFR BLD AUTO: 37.8 % (ref 37–48.5)
HGB BLD-MCNC: 12.7 G/DL (ref 12–16)
IMM GRANULOCYTES # BLD AUTO: 0.03 K/UL (ref 0–0.04)
IMM GRANULOCYTES NFR BLD AUTO: 0.3 % (ref 0–0.5)
LYMPHOCYTES # BLD AUTO: 2.6 K/UL (ref 1–4.8)
LYMPHOCYTES NFR BLD: 26.8 % (ref 18–48)
MCH RBC QN AUTO: 31.5 PG (ref 27–31)
MCHC RBC AUTO-ENTMCNC: 33.6 G/DL (ref 32–36)
MCV RBC AUTO: 94 FL (ref 82–98)
METHADONE UR QL SCN>300 NG/ML: NEGATIVE
MONOCYTES # BLD AUTO: 0.7 K/UL (ref 0.3–1)
MONOCYTES NFR BLD: 7.3 % (ref 4–15)
NEUTROPHILS # BLD AUTO: 6.2 K/UL (ref 1.8–7.7)
NEUTROPHILS NFR BLD: 64.8 % (ref 38–73)
NRBC BLD-RTO: 0 /100 WBC
OPIATES UR QL SCN: ABNORMAL
PCP UR QL SCN>25 NG/ML: NEGATIVE
PLATELET # BLD AUTO: 176 K/UL (ref 150–450)
PMV BLD AUTO: 11.2 FL (ref 9.2–12.9)
POCT GLUCOSE: 87 MG/DL (ref 70–110)
POTASSIUM SERPL-SCNC: 4 MMOL/L (ref 3.5–5.1)
PROT SERPL-MCNC: 7.6 G/DL (ref 6–8.4)
RBC # BLD AUTO: 4.03 M/UL (ref 4–5.4)
SODIUM SERPL-SCNC: 138 MMOL/L (ref 136–145)
TOXICOLOGY INFORMATION: ABNORMAL
TROPONIN I SERPL DL<=0.01 NG/ML-MCNC: 0.01 NG/ML (ref 0–0.03)
TROPONIN I SERPL DL<=0.01 NG/ML-MCNC: 0.01 NG/ML (ref 0–0.03)
WBC # BLD AUTO: 9.55 K/UL (ref 3.9–12.7)

## 2023-12-15 PROCEDURE — 80307 DRUG TEST PRSMV CHEM ANLYZR: CPT | Mod: ER | Performed by: EMERGENCY MEDICINE

## 2023-12-15 PROCEDURE — 93005 ELECTROCARDIOGRAM TRACING: CPT | Mod: ER

## 2023-12-15 PROCEDURE — 93010 EKG 12-LEAD: ICD-10-PCS | Mod: ,,, | Performed by: INTERNAL MEDICINE

## 2023-12-15 PROCEDURE — 80053 COMPREHEN METABOLIC PANEL: CPT | Mod: ER | Performed by: EMERGENCY MEDICINE

## 2023-12-15 PROCEDURE — 82962 GLUCOSE BLOOD TEST: CPT | Mod: ER

## 2023-12-15 PROCEDURE — 82077 ASSAY SPEC XCP UR&BREATH IA: CPT | Mod: ER | Performed by: EMERGENCY MEDICINE

## 2023-12-15 PROCEDURE — 85025 COMPLETE CBC W/AUTO DIFF WBC: CPT | Mod: ER | Performed by: EMERGENCY MEDICINE

## 2023-12-15 PROCEDURE — 25000003 PHARM REV CODE 250: Mod: ER | Performed by: EMERGENCY MEDICINE

## 2023-12-15 PROCEDURE — 99285 EMERGENCY DEPT VISIT HI MDM: CPT | Mod: 25,ER

## 2023-12-15 PROCEDURE — 80143 DRUG ASSAY ACETAMINOPHEN: CPT | Mod: ER | Performed by: EMERGENCY MEDICINE

## 2023-12-15 PROCEDURE — 84484 ASSAY OF TROPONIN QUANT: CPT | Mod: ER | Performed by: EMERGENCY MEDICINE

## 2023-12-15 PROCEDURE — 94761 N-INVAS EAR/PLS OXIMETRY MLT: CPT | Mod: ER

## 2023-12-15 PROCEDURE — 25500020 PHARM REV CODE 255: Mod: ER | Performed by: EMERGENCY MEDICINE

## 2023-12-15 PROCEDURE — 93010 ELECTROCARDIOGRAM REPORT: CPT | Mod: ,,, | Performed by: INTERNAL MEDICINE

## 2023-12-15 PROCEDURE — 83880 ASSAY OF NATRIURETIC PEPTIDE: CPT | Mod: ER | Performed by: EMERGENCY MEDICINE

## 2023-12-15 RX ORDER — HYDROCODONE BITARTRATE AND ACETAMINOPHEN 5; 325 MG/1; MG/1
1 TABLET ORAL
Status: COMPLETED | OUTPATIENT
Start: 2023-12-15 | End: 2023-12-15

## 2023-12-15 RX ORDER — KETOROLAC TROMETHAMINE 10 MG/1
10 TABLET, FILM COATED ORAL
Status: COMPLETED | OUTPATIENT
Start: 2023-12-15 | End: 2023-12-15

## 2023-12-15 RX ORDER — NAPROXEN 500 MG/1
500 TABLET ORAL 2 TIMES DAILY PRN
Qty: 20 TABLET | Refills: 0 | Status: SHIPPED | OUTPATIENT
Start: 2023-12-15 | End: 2024-01-04

## 2023-12-15 RX ORDER — ESCITALOPRAM OXALATE 10 MG/1
1 TABLET, FILM COATED ORAL DAILY
COMMUNITY
Start: 2023-12-07

## 2023-12-15 RX ADMIN — KETOROLAC TROMETHAMINE 10 MG: 10 TABLET, FILM COATED ORAL at 06:12

## 2023-12-15 RX ADMIN — HYDROCODONE BITARTRATE AND ACETAMINOPHEN 1 TABLET: 5; 325 TABLET ORAL at 02:12

## 2023-12-15 RX ADMIN — IOHEXOL 100 ML: 350 INJECTION, SOLUTION INTRAVENOUS at 04:12

## 2023-12-15 NOTE — ED PROVIDER NOTES
Emergency Medicine Provider Note - 12/15/2023       History     Chief Complaint   Patient presents with    Loss of Consciousness     Pt had a syncopal episode in bathroom around 2am. Pain in right shoulder. Gcs 15.        Allergies:  Review of patient's allergies indicates:  No Known Allergies     History of Present Illness   HPI    12/15/2023, 1:10 PM  The history is provided by the patient    Yaneth Fabian is a 68 y.o. female presenting to the ED for right upper back pain.  Patient took an ambien prior to going to bed.   Patient woke up at 2:00 a.m. this morning to go the bathroom.  Next thing  'she was on the bathroom floor, face down.' She does not know how it happed.  (No preceding chest pain, chest pressure, indigestion, headache, severe back pain preceding the incident.)   She was able to get up and return to the bed.  Currently, she is having pain in the right upper shoulder and upper thoracic region.  This pain is   aggravated by movement  for the right arm or twisting the torso.  Currently she denies headache, paresthesias of the upper extremity, chest pain, chest pressure, new or worsening back pain, difficulty breathing, shortness of breath, calf pain, calf tenderness, blood in stool, black tarry stool, nausea, vomiting, diarrhea.  She denies changes in medications.  No history of heart failure.       Arrival mode: Private Vehicle     PCP: Vahe Hawkins MD     Past Medical History:  Past Medical History:   Diagnosis Date    Arthritis     Asthma     DVT (deep venous thrombosis)     left popliteal    GERD (gastroesophageal reflux disease)     Hyperlipidemia     Hypertension     Insomnia 6/13/2013    Knee pain     Obesity (BMI 35.0-39.9 without comorbidity) 2/20/2017       Past Surgical History:  Past Surgical History:   Procedure Laterality Date    APPENDECTOMY      BLADDER REPAIR      HERNIA REPAIR      JOINT REPLACEMENT      KNEE SURGERY      right    ROBOT-ASSISTED REPAIR OF INCISIONAL HERNIA  USING DA MIRIAN XI N/A 10/22/2018    Procedure: XI ROBOTIC REPAIR, HERNIA, INCISIONAL;  Surgeon: Skyler Goddard MD;  Location: [facility];  Service: General;  Laterality: N/A;    TUBAL LIGATION      TYMPANIC MEMBRANE REPAIR           Family History:  Family History   Problem Relation Age of Onset    Heart disease Mother     Hypertension Mother     Diabetes Mother     No Known Problems Father         unknown who he was per pt    Breast cancer Neg Hx     Colon cancer Neg Hx     Ovarian cancer Neg Hx     Thrombosis Neg Hx        Social History:  Social History     Tobacco Use    Smoking status: Never    Smokeless tobacco: Never   Substance and Sexual Activity    Alcohol use: No    Drug use: No    Sexual activity: Yes     Partners: Male     Comment: mut monog        Review of Systems   Review of Systems   Constitutional:  Negative for fever.   HENT:  Negative for sore throat.    Eyes:  Negative for visual disturbance.   Respiratory:  Negative for cough and shortness of breath.    Cardiovascular:  Negative for chest pain, palpitations and leg swelling.   Gastrointestinal:  Negative for abdominal pain, blood in stool, nausea and vomiting.   Genitourinary:  Negative for dysuria.   Musculoskeletal:  Positive for arthralgias, myalgias (Right shoulder and right upper thoracic back) and neck stiffness. Negative for back pain.   Skin:  Negative for rash.   Neurological:  Positive for syncope (+/- ?). Negative for dizziness, speech difficulty, weakness and headaches.   Hematological:  Does not bruise/bleed easily.          Physical Exam     Initial Vitals [12/15/23 1303]   BP Pulse Resp Temp SpO2   (!) 145/67 60 18 97.7 °F (36.5 °C) 100 %      MAP       --          Physical Exam  Pulmonary:             Nursing Notes and Vital Signs Reviewed.  Constitutional: Patient is in no apparent distress. Well-developed and well-nourished.  Head: Atraumatic. Normocephalic.  Eyes: PERRL. EOM intact. Conjunctivae are not pale. No scleral icterus.    ENT: Mucous membranes are moist. Oropharynx is clear and symmetric.  No hemotympanum.  Neck: Supple. Full ROM. No lymphadenopathy. Paraspinal TTP of C4.  No bruits.   Cardiovascular: Regular rate. Regular rhythm. No murmurs, rubs, or gallops. Distal pulses are 2+ and symmetric.  Pulmonary/Chest: No respiratory distress. Clear to auscultation bilaterally. No wheezing or rales.  Abdominal: Soft and non-distended.  There is no tenderness.  No rebound, guarding, or rigidity. Good bowel sounds.  Musculoskeletal: Moves all extremities. No obvious deformities. No edema. No calf tenderness.  Edema noted to the right scapular region.   No bruising, no crepitus.  T spine:  TTP at T4.  No bony step off, no bruising  L spine:  no midline TTP, no boy step off, no bruising.   Right shoulder:  No bony step off, no decrease ROM.  No bruising.  Intact to median, ulnar, and radial nerves bilaterally, able to lift both arms above the head without difficulty.   Skin: Warm and dry.  Neurological:  Alert, awake, and appropriate.  Normal speech.  No acute focal neurological deficits are appreciated.  CN II-XII intact.    FTN intact.  GCS = 15.  Psychiatric: Normal affect. Good eye contact. Appropriate in content.     ED Course   ED Procedures:  Splint Application    Date/Time: 12/16/2023 6:20 PM    Performed by: Nandini Ibarra RN  Authorized by: Radha Lui DO  Location details: right arm  Splint type: Commerical sling.  Post-procedure: The splinted body part was neurovascularly unchanged following the procedure.  Patient tolerance: Patient tolerated the procedure well with no immediate complications          ED Vital Signs:  Vitals:    12/15/23 1303 12/15/23 1348 12/15/23 1356 12/15/23 1358   BP: (!) 145/67  (!) 145/64 (!) 143/65   Pulse: 60 64 (!) 56 (!) 55   Resp: 18 18     Temp: 97.7 °F (36.5 °C)      TempSrc: Oral      SpO2: 100% 100% 95%    Weight: 89.9 kg (198 lb 4.9 oz)       12/15/23 1400 12/15/23 1404 12/15/23  1654 12/15/23 1838   BP: (!) 127/54  (!) 174/77 (!) 187/86   Pulse: (!) 57  60    Resp:  16  20   Temp:       TempSrc:       SpO2:   100% 100%   Weight:           Abnormal Lab Results:  Labs Reviewed   CBC W/ AUTO DIFFERENTIAL - Abnormal; Notable for the following components:       Result Value    MCH 31.5 (*)     All other components within normal limits   DRUG SCREEN PANEL, URINE EMERGENCY - Abnormal; Notable for the following components:    Opiate Scrn, Ur Presumptive Positive (*)     All other components within normal limits    Narrative:     Specimen Source->Urine   ACETAMINOPHEN LEVEL - Abnormal; Notable for the following components:    Acetaminophen (Tylenol), Serum <3.0 (*)     All other components within normal limits   COMPREHENSIVE METABOLIC PANEL   TROPONIN I   B-TYPE NATRIURETIC PEPTIDE   ALCOHOL,MEDICAL (ETHANOL)   TROPONIN I   POCT GLUCOSE        All Lab Results:  Results for orders placed or performed during the hospital encounter of 12/15/23   CBC auto differential   Result Value Ref Range    WBC 9.55 3.90 - 12.70 K/uL    RBC 4.03 4.00 - 5.40 M/uL    Hemoglobin 12.7 12.0 - 16.0 g/dL    Hematocrit 37.8 37.0 - 48.5 %    MCV 94 82 - 98 fL    MCH 31.5 (H) 27.0 - 31.0 pg    MCHC 33.6 32.0 - 36.0 g/dL    RDW 12.7 11.5 - 14.5 %    Platelets 176 150 - 450 K/uL    MPV 11.2 9.2 - 12.9 fL    Immature Granulocytes 0.3 0.0 - 0.5 %    Gran # (ANC) 6.2 1.8 - 7.7 K/uL    Immature Grans (Abs) 0.03 0.00 - 0.04 K/uL    Lymph # 2.6 1.0 - 4.8 K/uL    Mono # 0.7 0.3 - 1.0 K/uL    Eos # 0.0 0.0 - 0.5 K/uL    Baso # 0.04 0.00 - 0.20 K/uL    nRBC 0 0 /100 WBC    Gran % 64.8 38.0 - 73.0 %    Lymph % 26.8 18.0 - 48.0 %    Mono % 7.3 4.0 - 15.0 %    Eosinophil % 0.4 0.0 - 8.0 %    Basophil % 0.4 0.0 - 1.9 %    Differential Method Automated    Comprehensive metabolic panel   Result Value Ref Range    Sodium 138 136 - 145 mmol/L    Potassium 4.0 3.5 - 5.1 mmol/L    Chloride 103 95 - 110 mmol/L    CO2 26 23 - 29 mmol/L    Glucose  81 70 - 110 mg/dL    BUN 21 8 - 23 mg/dL    Creatinine 0.8 0.5 - 1.4 mg/dL    Calcium 9.3 8.7 - 10.5 mg/dL    Total Protein 7.6 6.0 - 8.4 g/dL    Albumin 3.7 3.5 - 5.2 g/dL    Total Bilirubin 0.3 0.1 - 1.0 mg/dL    Alkaline Phosphatase 75 55 - 135 U/L    AST 14 10 - 40 U/L    ALT 13 10 - 44 U/L    eGFR >60.0 >60 mL/min/1.73 m^2    Anion Gap 9 8 - 16 mmol/L   Troponin I #1   Result Value Ref Range    Troponin I 0.007 0.000 - 0.026 ng/mL   BNP   Result Value Ref Range    BNP 32 0 - 99 pg/mL   Drug screen panel, emergency   Result Value Ref Range    Benzodiazepines Negative Negative    Methadone metabolites Negative Negative    Cocaine (Metab.) Negative Negative    Opiate Scrn, Ur Presumptive Positive (A) Negative    Barbiturate Screen, Ur Negative Negative    Amphetamine Screen, Ur Negative Negative    THC Negative Negative    Phencyclidine Negative Negative    Creatinine, Urine 64.1 15.0 - 325.0 mg/dL    Toxicology Information SEE COMMENT    Ethanol   Result Value Ref Range    Alcohol, Serum <10 <10 mg/dL   Acetaminophen Level   Result Value Ref Range    Acetaminophen (Tylenol), Serum <3.0 (L) 10.0 - 20.0 ug/mL   Troponin I #2   Result Value Ref Range    Troponin I 0.006 0.000 - 0.026 ng/mL   POCT glucose   Result Value Ref Range    POCT Glucose 87 70 - 110 mg/dL         The EKG was ordered, reviewed, and independently interpreted by the ED provider:      ECG Results              EKG 12-lead (Final result)  Result time 12/15/23 16:56:19      Final result by Interface, Lab [facility] (12/15/23 16:56:19)                   Narrative:    Test Reason : R53.1,    Vent. Rate : 054 BPM     Atrial Rate : 054 BPM     P-R Int : 162 ms          QRS Dur : 144 ms      QT Int : 472 ms       P-R-T Axes : 062 -05 023 degrees     QTc Int : 447 ms    Sinus bradycardia  Right bundle branch block  Abnormal ECG  When compared with ECG of 08-OCT-2023 21:10,  Vent. rate has decreased BY  34 BPM  The axis Shifted right  Nonspecific T wave  abnormality has replaced inverted T waves in Anterior  leads  Confirmed by STAR BREWER MD ([id number]) on 12/15/2023 4:56:15 PM    Referred By: [email or web address]   SELF           Confirmed By:STAR BREWER MD                      Wet Read by Radha Lui DO (12/15/23 14:42:13, [facility] Emergency Dept, Emergency Medicine)    Sinus bradycardia.  Normal axis.  Rate 54.  Right bundle-branch block.  No ST segment elevation.  No STEMI                                      Imaging Results:  Imaging Results              CT Cervical Spine Without Contrast (Final result)  Result time 12/15/23 16:31:58      Final result by Radha Lieberman MD (12/15/23 16:31:58)                   Impression:      No acute osseous findings; ligamentous injury not excluded by CT      Electronically signed by: Radha Lieberman  Date:    12/15/2023  Time:    16:31               Narrative:    EXAMINATION:  CT CERVICAL SPINE WITHOUT CONTRAST    CLINICAL HISTORY:  Neck trauma (Age >= 65y);    TECHNIQUE:  Low dose axial images, sagittal and coronal reformations were performed though the cervical spine.  Contrast was not administered.    COMPARISON:  None    FINDINGS:  No acute fracture or traumatic malalignment seen.    Multilevel spondylosis at least moderate with straightening of normal lordosis.                                       CTA Chest Non-Coronary (PE Studies) (Final result)  Result time 12/15/23 16:26:50      Final result by Rodney Kaufman MD (12/15/23 16:26:50)                   Impression:      No acute findings.  Negative for pulmonary embolus.    All CT scans at this facility are performed  using dose modulation techniques as appropriate to performed exam including the following:  automated exposure control; adjustment of mA and/or kV according to the patients size (this includes techniques or standardized protocols for targeted exams where dose is matched to indication/reason for exam: i.e. extremities or head);  iterative  reconstruction technique.      Electronically signed by: Rodney Kaufman MD  Date:    12/15/2023  Time:    16:26               Narrative:    EXAMINATION:  CTA CHEST NON CORONARY (PE STUDIES)    CLINICAL HISTORY:  Pulmonary embolism (PE) suspected, unknown D-dimer; Dorsalgia, unspecified    TECHNIQUE:  CT angiogram through the chest following IV contrast administration.  Multiplanar MIP reformations performed.    COMPARISON:  Chest x-ray 12/15/2023    FINDINGS  Heart: No pericardial effusions.    Mediastinum: Small calcified mediastinal lymph nodes left mediastinum typical of remote granulomatous infection.  No enlarged lymph nodes.    Vasculature: Negative for pulmonary emboli.  Mild aortic atherosclerosis without aneurysm or dissection.    Lungs: Clear lungs. No pleural effusion.    Esophagus: Unremarkable.    Upper Abdomen: Cholecystectomy.  Hypertrophy left hepatic lobe.  Question cirrhosis.    Bones: No acute fracture. Multilevel spondylosis in the spine.                                       CT Head Without Contrast (Final result)  Result time 12/15/23 16:09:19      Final result by Rodney Kaufman MD (12/15/23 16:09:19)                   Impression:      Negative for acute intracranial abnormality.    All CT scans at this facility are performed  using dose modulation techniques as appropriate to performed exam including the following:  automated exposure control; adjustment of mA and/or kV according to the patients size (this includes techniques or standardized protocols for targeted exams where dose is matched to indication/reason for exam: i.e. extremities or head);  iterative reconstruction technique.      Electronically signed by: Rodney Kaufman MD  Date:    12/15/2023  Time:    16:09               Narrative:    EXAMINATION:  CT HEAD WITHOUT CONTRAST    CLINICAL HISTORY:  Syncope, recurrent; Repeated falls    TECHNIQUE:  Axial CT images obtained throughout the head without intravenous  contrast.    COMPARISON:  None.    FINDINGS:  Negative for acute hemorrhage, mass effect, extraaxial collection, hydrocephalus.    There is good gray white matter differentiation.  Calcified plaque skull-base vasculature    The paranasal sinuses and mastoids are clear.    The calvarium is unremarkable with no fractures.                                       X-Ray Chest AP Portable (Final result)  Result time 12/15/23 14:36:32      Final result by Luis Fernando Amato III, MD (12/15/23 14:36:32)                   Impression:      Cardiomegaly      Electronically signed by: Hugh Amato  Date:    12/15/2023  Time:    14:36               Narrative:    EXAMINATION:  XR CHEST AP PORTABLE    CLINICAL HISTORY:  Syncope;    TECHNIQUE:  Single frontal view of the chest was performed.    COMPARISON:  None    FINDINGS:  The cardiac silhouette is enlarged.  The mediastinum is unremarkable.  No pneumothorax, pleural effusion or acute infiltrate.                                       X-Ray Shoulder Trauma Right (Final result)  Result time 12/15/23 14:35:37      Final result by Luis Fernando Amato III, MD (12/15/23 14:35:37)                   Impression:      As above      Electronically signed by: Hugh Amato  Date:    12/15/2023  Time:    14:35               Narrative:    EXAMINATION:  XR SHOULDER TRAUMA 3 VIEW RIGHT    CLINICAL HISTORY:  Unspecified injury of right shoulder and upper arm, initial encounter    TECHNIQUE:  Three or four views of the right shoulder were performed.    COMPARISON:  None    FINDINGS:  Arthritic changes involving the AC joint.  No acute fracture, dislocation or periarticular calcification.                                       Type of Interpretation: ED Physician (Independently Interpreted).  Interpretation: Chest x-ray:  No pneumothorax.  No infiltrate.    Right shoulder:  No fracture.          The Emergency Provider reviewed the vital signs and test results, which are outlined  above.     ED Discussion   ED Medication(s):  Medications   HYDROcodone-acetaminophen 5-325 mg per tablet 1 tablet (1 tablet Oral Given 12/15/23 1404)   iohexoL (OMNIPAQUE 350) injection 100 mL (100 mLs Intravenous Given 12/15/23 1600)   ketorolac tablet 10 mg (10 mg Oral Given 12/15/23 1835)       ED Course as of 12/16/23 0720   Fri Dec 15, 2023   1454 BUN: 21 [LB]   1454 Creatinine: 0.8 [LB]   1754 Troponin I: 0.006 [LB]   1822 Discussed results with patient.  All questions answered. [LB]      ED Course User Index  [LB] Radha Lui DO            1824 Reassessment: Dr. Lui reassessed the pt. Patient able to ambulate in the ED without assistance.   No arhythmia noted during ED stay. GCS = 15.   The pt is resting comfortably and is NAD.  Pt states their sx have improved. Discussed test results, shared treatment plan, specific conditions for return, and the need for f/u.  Answered their questions at this time.  Pt understands and agrees to the plan.  The pt has remained hemodynamically stable through ED course and is stable for discharge.    I discussed with patient and/or family/caretaker that evaluation in the ED does not suggest any emergent or life threatening medical conditions requiring immediate intervention beyond what was provided in the ED, and I believe patient is safe for discharge.  Regardless, an unremarkable evaluation in the ED does not preclude the development or presence of a serious of life threatening condition. As such, patient was instructed to return immediately for any worsening or change in current symptoms.     MIPS Measures     Smoker? No     Hypertension: History of Hypertension: The patient has elevated blood pressure (higher than 120/80) while being treated in the ED but has a history of hypertension.     Medical Decision Making                 Medical Decision Making  Differential Diagnosis:  Medication effects, falling, anemia, vasovagal syncope, prolonged QT, electrolyte  abnormality    Orthostatics negative.  EKG unchanged.  Troponins negative x 2.   GCS 15.    Patient advised to avoid Ambien.    Amount and/or Complexity of Data Reviewed  Labs: ordered. Decision-making details documented in ED Course.  Radiology: ordered and independent interpretation performed. Decision-making details documented in ED Course.  ECG/medicine tests: ordered and independent interpretation performed. Decision-making details documented in ED Course.    Risk  Prescription drug management.  Risk Details: Discharge Medication List as of 12/15/2023  6:31 PM    START taking these medications    naproxen (NAPROSYN) 500 MG tablet  Take 1 tablet (500 mg total) by mouth 2 (two) times daily as needed., Starting Fri 12/15/2023, Until Thu 1/4/2024 at 2359, Print                Coding    Prescription Management: I performed a review of the patient's current Rx medication list as input by nursing staff.    Discharge Medication List as of 12/15/2023  6:31 PM        START taking these medications    Details   naproxen (NAPROSYN) 500 MG tablet Take 1 tablet (500 mg total) by mouth 2 (two) times daily as needed., Starting Fri 12/15/2023, Until Thu 1/4/2024 at 2359, Print           CONTINUE these medications which have NOT CHANGED    Details   furosemide (LASIX) 20 MG tablet Take 1 tablet (20 mg total) by mouth once daily., Starting 10/28/2013, Until Discontinued, Normal      LEXAPRO 10 mg tablet Take 1 tablet by mouth once daily., Starting Thu 12/7/2023, Historical Med      metoclopramide HCl (REGLAN) 10 MG tablet Take 1 tablet (10 mg total) by mouth every 6 (six) hours., Starting Tue 12/8/2020, Print      montelukast (SINGULAIR) 10 mg tablet Take 10 mg by mouth every evening., Starting 4/18/2015, Until Discontinued, Historical Med      pantoprazole (PROTONIX) 40 MG tablet Take 40 mg by mouth once daily. , Starting Wed 5/13/2015, Historical Med      potassium chloride (KLOR-CON) 10 MEQ TbSR Take 1 tablet (10 mEq total) by  "mouth once daily., Starting 5/22/2014, Until Discontinued, Normal      zolpidem (AMBIEN) 5 MG Tab Take 1 tablet (5 mg total) by mouth nightly as needed., Starting Fri 2/20/2015, Print      ALBUTEROL INHL Inhale into the lungs., Until Discontinued, Historical Med      benazepril (LOTENSIN) 10 MG tablet Take 10 mg by mouth once daily., Until Discontinued, Historical Med      clotrimazole-betamethasone 1-0.05% (LOTRISONE) cream Apply to affected area 2 times daily, Normal              Discussed case with:N/A      Portions of this note may have been created with voice recognition software. Occasional "wrong-word" or "sound-a-like" substitutions may have occurred due to the inherent limitations of voice recognition software. Please, read the note carefully and recognize, using context, where substitutions have occurred.          Clinical Impression       ICD-10-CM ICD-9-CM   1. Near syncope  R55 780.2   2. Weakness  R53.1 780.79   3. Right shoulder injury  S49.91XA 959.2   4. Falling  R29.6 E888.9   5. Upper back pain  M54.9 724.5        Disposition        Disposition: Discharge to home  Patient condition: Good      ED Follow-up      Follow-up Information       Vahe Hawkins MD In 2 days.    Specialty: Pathology  Why: Return to the ED for:  Severe pain, confusion, blood in stool, nausea, vomiting, worsening pain, numbness/weakness on one side,  Contact information:  [address] [place] [id number]  [phone]                                      Radha Lui,   12/16/23 0746    "

## 2024-02-02 ENCOUNTER — HOSPITAL ENCOUNTER (EMERGENCY)
Facility: HOSPITAL | Age: 69
Discharge: HOME | End: 2024-02-02
Attending: EMERGENCY MEDICINE
Payer: MEDICARE

## 2024-02-02 VITALS
RESPIRATION RATE: 21 BRPM | OXYGEN SATURATION: 99 % | DIASTOLIC BLOOD PRESSURE: 70 MMHG | HEART RATE: 85 BPM | BODY MASS INDEX: 36.63 KG/M2 | WEIGHT: 199.06 LBS | SYSTOLIC BLOOD PRESSURE: 157 MMHG | HEIGHT: 62 IN | TEMPERATURE: 98 F

## 2024-02-02 DIAGNOSIS — R05.9 COUGH, UNSPECIFIED TYPE: ICD-10-CM

## 2024-02-02 DIAGNOSIS — Z20.828 EXPOSURE TO INFLUENZA: Primary | ICD-10-CM

## 2024-02-02 LAB
CTP QC/QA: YES
CTP QC/QA: YES
POC MOLECULAR INFLUENZA A AGN: NEGATIVE
POC MOLECULAR INFLUENZA B AGN: NEGATIVE
SARS-COV-2 RDRP RESP QL NAA+PROBE: NEGATIVE

## 2024-02-02 PROCEDURE — 99283 EMERGENCY DEPT VISIT LOW MDM: CPT | Mod: ER

## 2024-02-02 PROCEDURE — 87502 INFLUENZA DNA AMP PROBE: CPT | Mod: ER

## 2024-02-02 PROCEDURE — 87635 SARS-COV-2 COVID-19 AMP PRB: CPT | Mod: ER | Performed by: NURSE PRACTITIONER

## 2024-02-02 RX ORDER — OSELTAMIVIR PHOSPHATE 75 MG/1
75 CAPSULE ORAL 2 TIMES DAILY
Qty: 10 CAPSULE | Refills: 0 | Status: SHIPPED | OUTPATIENT
Start: 2024-02-02 | End: 2024-02-07

## 2024-02-02 NOTE — ED PROVIDER NOTES
Encounter Date: 2/2/2024       History     Chief Complaint   Patient presents with    Cough     Cough and body aches for 1 day     Patient presents to ER for cough, onset 1 day ago.  Associated symptoms include generalized body aches.  She has taken nothing for the symptoms.  Symptoms have been constant since onset.  She is here with a family member who has similar symptoms and has tested positive for influenza.  Patient denies fever, chills, chest pain, shortness of breath, abdominal pain, weakness, fatigue, sore throat.    The history is provided by the patient.     Review of patient's allergies indicates:  No Known Allergies  Past Medical History:   Diagnosis Date    Arthritis     Asthma     DVT (deep venous thrombosis)     left popliteal    GERD (gastroesophageal reflux disease)     Hyperlipidemia     Hypertension     Insomnia 6/13/2013    Knee pain     Obesity (BMI 35.0-39.9 without comorbidity) 2/20/2017     Past Surgical History:   Procedure Laterality Date    APPENDECTOMY      BLADDER REPAIR      HERNIA REPAIR      JOINT REPLACEMENT      KNEE SURGERY      right    ROBOT-ASSISTED REPAIR OF INCISIONAL HERNIA USING DA MIRIAN XI N/A 10/22/2018    Procedure: XI ROBOTIC REPAIR, HERNIA, INCISIONAL;  Surgeon: Skyler Goddard MD;  Location: [facility];  Service: General;  Laterality: N/A;    TUBAL LIGATION      TYMPANIC MEMBRANE REPAIR       Family History   Problem Relation Age of Onset    Heart disease Mother     Hypertension Mother     Diabetes Mother     No Known Problems Father         unknown who he was per pt    Breast cancer Neg Hx     Colon cancer Neg Hx     Ovarian cancer Neg Hx     Thrombosis Neg Hx      Social History     Tobacco Use    Smoking status: Never    Smokeless tobacco: Never   Substance Use Topics    Alcohol use: No    Drug use: No     Review of Systems   Constitutional:  Negative for chills, fatigue and fever.   HENT:  Negative for congestion, ear pain, rhinorrhea, sinus pain and sore throat.    Eyes:   Negative for pain.   Respiratory:  Positive for cough. Negative for shortness of breath.    Cardiovascular:  Negative for chest pain.   Gastrointestinal:  Negative for abdominal pain, nausea and vomiting.   Genitourinary:  Negative for dysuria.   Musculoskeletal:  Negative for back pain and neck pain.        +generalized body aches   Skin:  Negative for rash.   Neurological:  Negative for weakness and headaches.   All other systems reviewed and are negative.      Physical Exam     Initial Vitals [02/02/24 1559]   BP Pulse Resp Temp SpO2   (!) 157/70 85 (!) 21 98.2 °F (36.8 °C) 99 %      MAP       --         Physical Exam    Nursing note and vitals reviewed.  Constitutional: She is not diaphoretic. She is cooperative.  Non-toxic appearance. She does not have a sickly appearance. She does not appear ill. No distress.   HENT:   Head: Normocephalic and atraumatic.   Right Ear: Tympanic membrane and external ear normal.   Left Ear: Tympanic membrane and external ear normal.   Nose: Nose normal.   Mouth/Throat: Oropharynx is clear and moist and mucous membranes are normal. No oropharyngeal exudate, posterior oropharyngeal edema or posterior oropharyngeal erythema.   Eyes: Conjunctivae are normal.   Neck: Neck supple.   Normal range of motion.  Cardiovascular:  Normal rate, regular rhythm and intact distal pulses.           Pulmonary/Chest: Breath sounds normal. No respiratory distress. She has no wheezes. She has no rhonchi. She has no rales.   Abdominal: Abdomen is soft. There is no abdominal tenderness.   Musculoskeletal:         General: Normal range of motion.      Cervical back: Normal range of motion and neck supple.     Neurological: She is alert and oriented to person, place, and time. She has normal strength. GCS score is 15. GCS eye subscore is 4. GCS verbal subscore is 5. GCS motor subscore is 6.   Skin: Skin is warm and dry. Capillary refill takes less than 2 seconds.         ED Course   Procedures  Labs  Reviewed   POCT INFLUENZA A/B MOLECULAR   SARS-COV-2 RDRP GENE    Narrative:     .This test utilizes isothermal nucleic acid amplification technology to detect the SARS-CoV-2 RdRp nucleic acid segment. The analytical sensitivity (limit of detection) is 500 copies/swab.     A POSITIVE result is indicative of the presence of SARS-CoV-2 RNA; clinical correlation with patient history and other diagnostic information is necessary to determine patient infection status.    A NEGATIVE result means that SARS-CoV-2 nucleic acids are not present above the limit of detection. A NEGATIVE result should be treated as presumptive. It does not rule out the possibility of COVID-19 and should not be the sole basis for treatment decisions. If COVID-19 is strongly suspected based on clinical and exposure history, re-testing using an alternate molecular assay should be considered.     Commercial kits are provided by [employer].   _________________________________________________________________   The authorized Fact Sheet for Healthcare Providers and the authorized Fact Sheet for Patients of the ID NOW COVID-19 are available on the FDA website:    https://www.fda.gov/media/186942/download      https://www.fda.gov/media/001724/download            Results for orders placed or performed during the hospital encounter of 02/02/24   POCT Influenza A/B Molecular   Result Value Ref Range    POC Molecular Influenza A Ag Negative Negative, Not Reported    POC Molecular Influenza B Ag Negative Negative, Not Reported     Acceptable Yes    POCT COVID-19 Rapid Screening   Result Value Ref Range    POC Rapid COVID Negative Negative     Acceptable Yes          Imaging Results    None          Medications - No data to display  Medical Decision Making  Amount and/or Complexity of Data Reviewed  Labs: ordered.    Risk  Prescription drug management.                   Results reviewed and discussed with patient she  verbalized understanding with no further concerns.  Patient is here with a family member who has tested positive for influenza B. despite negative results, will treat patient with Tamiflu due to close influenza exposure.  Patient is nontoxic/non ill-appearing.  Vital signs are stable.  Discussed symptomatic care at home.  Discussed use of over-the-counter Tylenol/ibuprofen for pain/fever.  Discussed outpatient follow-up with PCP.  Discussed signs symptoms to return to ER.  Patient agrees with this plan and voiced no further concerns.  I discussed with patient that evaluation in the ED does not suggest any emergent or life threatening medical conditions requiring immediate intervention beyond what was provided in the ED, and I believe patient is safe for discharge. Regardless, an unremarkable evaluation in the ED does not preclude the development or presence of a serious of life threatening condition. As such, patient was instructed to return immediately for any worsening or change in current symptoms.                     Clinical Impression:  Final diagnoses:  [R05.9] Cough, unspecified type  [Z20.828] Exposure to influenza (Primary)          ED Disposition Condition    Discharge Stable          ED Prescriptions       Medication Sig Dispense Start Date End Date Auth. Provider    oseltamivir (TAMIFLU) 75 MG capsule Take 1 capsule (75 mg total) by mouth 2 (two) times daily. for 5 days 10 capsule 2/2/2024 2/7/2024 Kemal Kiran NP          Follow-up Information       Follow up With Specialties Details Why Contact Info    Vahe Hawkins MD Pathology In 2 days  [address] [facility] [address]  [facility] [address]  [phone]      [facility] - Emergency Dept Emergency Medicine  As needed, If symptoms worsen [id number] [address] include 234 beds at the [facility] [address]  [facility] [address] [phone]             Kemal Kiran NP  02/02/24 9618

## 2024-03-17 ENCOUNTER — HOSPITAL ENCOUNTER (EMERGENCY)
Facility: HOSPITAL | Age: 69
Discharge: HOME | End: 2024-03-17
Attending: EMERGENCY MEDICINE
Payer: MEDICARE

## 2024-03-17 VITALS
DIASTOLIC BLOOD PRESSURE: 58 MMHG | RESPIRATION RATE: 19 BRPM | TEMPERATURE: 97 F | HEART RATE: 70 BPM | HEIGHT: 63 IN | WEIGHT: 199.06 LBS | SYSTOLIC BLOOD PRESSURE: 126 MMHG | BODY MASS INDEX: 35.27 KG/M2 | OXYGEN SATURATION: 97 %

## 2024-03-17 DIAGNOSIS — R55 SYNCOPE: Primary | ICD-10-CM

## 2024-03-17 DIAGNOSIS — E86.0 DEHYDRATION: ICD-10-CM

## 2024-03-17 LAB
ALBUMIN SERPL BCP-MCNC: 2.9 G/DL (ref 3.5–5.2)
ALP SERPL-CCNC: 66 U/L (ref 55–135)
ALT SERPL W/O P-5'-P-CCNC: 15 U/L (ref 10–44)
ANION GAP SERPL CALC-SCNC: 10 MMOL/L (ref 8–16)
AST SERPL-CCNC: 14 U/L (ref 10–40)
BASOPHILS # BLD AUTO: 0.03 K/UL (ref 0–0.2)
BASOPHILS NFR BLD: 0.4 % (ref 0–1.9)
BILIRUB SERPL-MCNC: 0.3 MG/DL (ref 0.1–1)
BNP SERPL-MCNC: 11 PG/ML (ref 0–99)
BUN SERPL-MCNC: 11 MG/DL (ref 8–23)
CALCIUM SERPL-MCNC: 7.9 MG/DL (ref 8.7–10.5)
CHLORIDE SERPL-SCNC: 107 MMOL/L (ref 95–110)
CO2 SERPL-SCNC: 23 MMOL/L (ref 23–29)
CREAT SERPL-MCNC: 1.1 MG/DL (ref 0.5–1.4)
DIFFERENTIAL METHOD BLD: ABNORMAL
EOSINOPHIL # BLD AUTO: 0 K/UL (ref 0–0.5)
EOSINOPHIL NFR BLD: 0.5 % (ref 0–8)
ERYTHROCYTE [DISTWIDTH] IN BLOOD BY AUTOMATED COUNT: 12.2 % (ref 11.5–14.5)
EST. GFR  (NO RACE VARIABLE): 55.4 ML/MIN/1.73 M^2
GLUCOSE SERPL-MCNC: 201 MG/DL (ref 70–110)
HCT VFR BLD AUTO: 34.8 % (ref 37–48.5)
HGB BLD-MCNC: 11.6 G/DL (ref 12–16)
IMM GRANULOCYTES # BLD AUTO: 0.03 K/UL (ref 0–0.04)
IMM GRANULOCYTES NFR BLD AUTO: 0.4 % (ref 0–0.5)
LYMPHOCYTES # BLD AUTO: 0.9 K/UL (ref 1–4.8)
LYMPHOCYTES NFR BLD: 11.6 % (ref 18–48)
MAGNESIUM SERPL-MCNC: 1.8 MG/DL (ref 1.6–2.6)
MCH RBC QN AUTO: 31.3 PG (ref 27–31)
MCHC RBC AUTO-ENTMCNC: 33.3 G/DL (ref 32–36)
MCV RBC AUTO: 94 FL (ref 82–98)
MONOCYTES # BLD AUTO: 0.6 K/UL (ref 0.3–1)
MONOCYTES NFR BLD: 7 % (ref 4–15)
NEUTROPHILS # BLD AUTO: 6.5 K/UL (ref 1.8–7.7)
NEUTROPHILS NFR BLD: 80.1 % (ref 38–73)
NRBC BLD-RTO: 0 /100 WBC
PHOSPHATE SERPL-MCNC: 3 MG/DL (ref 2.7–4.5)
PLATELET # BLD AUTO: 130 K/UL (ref 150–450)
PMV BLD AUTO: 11.5 FL (ref 9.2–12.9)
POTASSIUM SERPL-SCNC: 3.5 MMOL/L (ref 3.5–5.1)
PROT SERPL-MCNC: 5.8 G/DL (ref 6–8.4)
RBC # BLD AUTO: 3.71 M/UL (ref 4–5.4)
SODIUM SERPL-SCNC: 140 MMOL/L (ref 136–145)
TROPONIN I SERPL DL<=0.01 NG/ML-MCNC: <0.006 NG/ML (ref 0–0.03)
WBC # BLD AUTO: 8.12 K/UL (ref 3.9–12.7)

## 2024-03-17 PROCEDURE — 99285 EMERGENCY DEPT VISIT HI MDM: CPT | Mod: 25,ER

## 2024-03-17 PROCEDURE — 96361 HYDRATE IV INFUSION ADD-ON: CPT | Mod: ER

## 2024-03-17 PROCEDURE — 84484 ASSAY OF TROPONIN QUANT: CPT | Mod: ER | Performed by: EMERGENCY MEDICINE

## 2024-03-17 PROCEDURE — 83880 ASSAY OF NATRIURETIC PEPTIDE: CPT | Mod: ER | Performed by: EMERGENCY MEDICINE

## 2024-03-17 PROCEDURE — 63600175 PHARM REV CODE 636 W HCPCS: Mod: ER | Performed by: EMERGENCY MEDICINE

## 2024-03-17 PROCEDURE — 80053 COMPREHEN METABOLIC PANEL: CPT | Mod: ER | Performed by: EMERGENCY MEDICINE

## 2024-03-17 PROCEDURE — 96360 HYDRATION IV INFUSION INIT: CPT | Mod: ER

## 2024-03-17 PROCEDURE — 83735 ASSAY OF MAGNESIUM: CPT | Mod: ER | Performed by: EMERGENCY MEDICINE

## 2024-03-17 PROCEDURE — 93010 ELECTROCARDIOGRAM REPORT: CPT | Mod: ,,, | Performed by: INTERNAL MEDICINE

## 2024-03-17 PROCEDURE — 85025 COMPLETE CBC W/AUTO DIFF WBC: CPT | Mod: ER | Performed by: EMERGENCY MEDICINE

## 2024-03-17 PROCEDURE — 25000003 PHARM REV CODE 250: Mod: ER | Performed by: EMERGENCY MEDICINE

## 2024-03-17 PROCEDURE — 84100 ASSAY OF PHOSPHORUS: CPT | Mod: ER | Performed by: EMERGENCY MEDICINE

## 2024-03-17 PROCEDURE — 93005 ELECTROCARDIOGRAM TRACING: CPT | Mod: ER

## 2024-03-17 RX ADMIN — SODIUM CHLORIDE, POTASSIUM CHLORIDE, SODIUM LACTATE AND CALCIUM CHLORIDE 500 ML: 600; 310; 30; 20 INJECTION, SOLUTION INTRAVENOUS at 06:03

## 2024-03-17 RX ADMIN — SODIUM CHLORIDE 1000 ML: 9 INJECTION, SOLUTION INTRAVENOUS at 05:03

## 2024-03-17 NOTE — ED PROVIDER NOTES
Encounter Date: 3/17/2024       History     Chief Complaint   Patient presents with    Loss of Consciousness     X 2    Hypotension     67 y/o F with PMH of HTN, HLD, Asthma, GERD here with c/o syncope. This occurred just PTA. Patient was sitting at the side of her bed, and felt hungry. Prior to being able to eat, she passed out. She did not injury herself. Per EMS, her BP was 70's systolic. She received 600 mL NS PTA. 1 episode of vomiting with nausea PTA. Noted headache here in the ED that started after syncope. Denies any palpitations, chest pain, SOB, abdominal pain, fever, chills, numbness, weakness.     The history is provided by the patient.     Review of patient's allergies indicates:  No Known Allergies  Past Medical History:   Diagnosis Date    Arthritis     Asthma     DVT (deep venous thrombosis)     left popliteal    GERD (gastroesophageal reflux disease)     Hyperlipidemia     Hypertension     Insomnia 6/13/2013    Knee pain     Obesity (BMI 35.0-39.9 without comorbidity) 2/20/2017     Past Surgical History:   Procedure Laterality Date    APPENDECTOMY      BLADDER REPAIR      HERNIA REPAIR      JOINT REPLACEMENT      KNEE SURGERY      right    ROBOT-ASSISTED REPAIR OF INCISIONAL HERNIA USING DA MIRIAN XI N/A 10/22/2018    Procedure: XI ROBOTIC REPAIR, HERNIA, INCISIONAL;  Surgeon: Skyler Goddard MD;  Location: [facility] OR;  Service: General;  Laterality: N/A;    TUBAL LIGATION      TYMPANIC MEMBRANE REPAIR       Family History   Problem Relation Age of Onset    Heart disease Mother     Hypertension Mother     Diabetes Mother     No Known Problems Father         unknown who he was per pt    Breast cancer Neg Hx     Colon cancer Neg Hx     Ovarian cancer Neg Hx     Thrombosis Neg Hx      Social History     Tobacco Use    Smoking status: Never    Smokeless tobacco: Never   Substance Use Topics    Alcohol use: No    Drug use: No     Review of Systems   Constitutional:  Negative for diaphoresis and fever.   HENT:   Negative for congestion, dental problem and sore throat.    Eyes:  Negative for pain and visual disturbance.   Respiratory:  Negative for cough and shortness of breath.    Cardiovascular:  Negative for chest pain and palpitations.   Gastrointestinal:  Negative for abdominal pain, diarrhea, nausea and vomiting.   Genitourinary:  Negative for dysuria and flank pain.   Musculoskeletal:  Negative for back pain and neck pain.   Skin:  Negative for rash and wound.   Neurological:  Positive for syncope and headaches. Negative for weakness and numbness.   Psychiatric/Behavioral:  Negative for agitation and confusion.        Physical Exam     Initial Vitals [03/17/24 1638]   BP Pulse Resp Temp SpO2   (!) 108/56 62 19 97.3 °F (36.3 °C) 95 %      MAP       --         Physical Exam    Constitutional: She appears well-developed and well-nourished.   HENT:   Head: Normocephalic and atraumatic.   Eyes: EOM are normal. Pupils are equal, round, and reactive to light.   Neck: Neck supple.   Normal range of motion.  Cardiovascular:  Normal rate and regular rhythm.           Pulmonary/Chest: Breath sounds normal. No respiratory distress.   Abdominal: She exhibits no distension. There is no abdominal tenderness.   Musculoskeletal:      Cervical back: Normal range of motion and neck supple.     Neurological: She is alert and oriented to person, place, and time. She has normal strength. No sensory deficit.   Skin: Skin is warm and dry.   Psychiatric: She has a normal mood and affect.         ED Course   Procedures  Labs Reviewed   CBC W/ AUTO DIFFERENTIAL - Abnormal; Notable for the following components:       Result Value    RBC 3.71 (*)     Hemoglobin 11.6 (*)     Hematocrit 34.8 (*)     MCH 31.3 (*)     Platelets 130 (*)     Lymph # 0.9 (*)     Gran % 80.1 (*)     Lymph % 11.6 (*)     All other components within normal limits   COMPREHENSIVE METABOLIC PANEL - Abnormal; Notable for the following components:    Glucose 201 (*)      Calcium 7.9 (*)     Total Protein 5.8 (*)     Albumin 2.9 (*)     eGFR 55.4 (*)     All other components within normal limits   B-TYPE NATRIURETIC PEPTIDE   TROPONIN I   MAGNESIUM   PHOSPHORUS     EKG Readings: (Independently Interpreted)   Rate of 60 beats per minute.  Normal sinus rhythm.  Normal axis.  P.r., and QTC intervals within normal limits.  Right bundle-branch block present.  No STEMI.     ECG Results              EKG 12-lead (In process)        Collection Time Result Time QRS Duration OHS QTC Calculation    03/17/24 16:38:48 03/17/24 17:18:49 150 489                     In process by Interface, Lab In [facility] (03/17/24 17:18:52)                   Narrative:    Test Reason : R55,    Vent. Rate : 062 BPM     Atrial Rate : 062 BPM     P-R Int : 182 ms          QRS Dur : 150 ms      QT Int : 482 ms       P-R-T Axes : 052 -11 012 degrees     QTc Int : 489 ms    Normal sinus rhythm  Right bundle branch block  Abnormal ECG  When compared with ECG of 15-DEC-2023 13:41,  No significant change was found    Referred By: [email or web address]   SELF           Confirmed By:                                   Imaging Results              CT Head Without Contrast (Final result)  Result time 03/17/24 17:57:08      Final result by Kendrick Colmenares MD (03/17/24 17:57:08)                   Impression:      No acute abnormality.    Atrophy and chronic white matter changes    All CT scans   are performed using dose optimization techniques including the following: automated exposure control; adjustment of the mA and/or kV; use of iterative reconstruction technique.  Dose modulation was employed for ALARA by means of: Automated exposure control; adjustment of the mA and/or kV according to patient size (this includes techniques or standardized protocols for targeted exams where dose is matched to indication/reason for exam; i.e. extremities or head); and/or use of iterative reconstructive technique.      Electronically signed by: Kendrick  Darrian  Date:    03/17/2024  Time:    17:57               Narrative:    EXAMINATION:  CT HEAD WITHOUT CONTRAST    CLINICAL HISTORY:  Headache, new or worsening (Age >= 50y);    TECHNIQUE:  Low dose axial CT images obtained throughout the head without intravenous contrast. Sagittal and coronal reconstructions were performed.    COMPARISON:  None.    FINDINGS:  Atrophy and chronic white matter changes.    . No extra-axial blood or fluid collections.    No parenchymal mass, hemorrhage, edema or major vascular distribution infarct.    Skull/extracranial contents (limited evaluation): No fracture. Mastoid air cells and paranasal sinuses are essentially clear.                                       Medications   sodium chloride 0.9% bolus 1,000 mL 1,000 mL (0 mLs Intravenous Stopped 3/17/24 1740)   lactated ringers bolus 500 mL (500 mLs Intravenous New Bag 3/17/24 1803)     Medical Decision Making  DDx includes syncope, arrhythmia, hypovolemia, hypoglycemia, ICH, vagal episode    Amount and/or Complexity of Data Reviewed  Labs: ordered.  Radiology: ordered.                           Patient's evaluation in the ED does not suggest any emergent or life-threatening medical conditions requiring immediate intervention beyond what was provided in the ED, and I believe patient is safe for discharge. Regardless, an unremarkable evaluation in the ED does not preclude the development or presence of a serious or life-threatening condition. As such, patient was given return instructions for any change or worsening of symptoms.            Clinical Impression:  Final diagnoses:  [R55] Syncope (Primary)  [E86.0] Dehydration          ED Disposition Condition    Discharge Stable          ED Prescriptions    None       Follow-up Information       Follow up With Specialties Details Why Contact Info    Vahe Hawkins MD Pathology Schedule an appointment as soon as possible for a visit in 2 days For re-evaluation and further treatment  [address] [facility], [address]  [facility] [address]  [phone]      [facility] - Emergency Dept Emergency Medicine Go today If symptoms worsen, For re-evaluation and further treatment, As needed [address] [address] [id number] [phone]             Hilario Cho MD  03/17/24 1959

## 2024-03-18 LAB
OHS QRS DURATION: 150 MS
OHS QTC CALCULATION: 489 MS

## 2024-04-21 ENCOUNTER — HOSPITAL ENCOUNTER (EMERGENCY)
Facility: HOSPITAL | Age: 69
Discharge: HOME | End: 2024-04-21
Attending: EMERGENCY MEDICINE
Payer: MEDICARE

## 2024-04-21 VITALS
BODY MASS INDEX: 35.43 KG/M2 | TEMPERATURE: 98 F | SYSTOLIC BLOOD PRESSURE: 124 MMHG | HEART RATE: 86 BPM | WEIGHT: 196.88 LBS | DIASTOLIC BLOOD PRESSURE: 73 MMHG | OXYGEN SATURATION: 95 % | RESPIRATION RATE: 18 BRPM

## 2024-04-21 DIAGNOSIS — J06.9 VIRAL URI WITH COUGH: Primary | ICD-10-CM

## 2024-04-21 DIAGNOSIS — R06.2 WHEEZING ON EXPIRATION: ICD-10-CM

## 2024-04-21 LAB
CTP QC/QA: YES
SARS-COV-2 RDRP RESP QL NAA+PROBE: NEGATIVE

## 2024-04-21 PROCEDURE — 25000003 PHARM REV CODE 250: Mod: ER | Performed by: NURSE PRACTITIONER

## 2024-04-21 PROCEDURE — 87635 SARS-COV-2 COVID-19 AMP PRB: CPT | Mod: ER | Performed by: NURSE PRACTITIONER

## 2024-04-21 PROCEDURE — 99284 EMERGENCY DEPT VISIT MOD MDM: CPT | Mod: 25,ER

## 2024-04-21 RX ORDER — PROMETHAZINE HYDROCHLORIDE AND DEXTROMETHORPHAN HYDROBROMIDE 6.25; 15 MG/5ML; MG/5ML
5 SYRUP ORAL EVERY 6 HOURS PRN
Qty: 120 ML | Refills: 0 | Status: SHIPPED | OUTPATIENT
Start: 2024-04-21 | End: 2024-05-01

## 2024-04-21 RX ORDER — HYDROCODONE BITARTRATE AND ACETAMINOPHEN 7.5; 325 MG/1; MG/1
1 TABLET ORAL EVERY 8 HOURS PRN
COMMUNITY

## 2024-04-21 RX ORDER — HYDROCHLOROTHIAZIDE 25 MG/1
25 TABLET ORAL
COMMUNITY

## 2024-04-21 RX ORDER — BUDESONIDE AND FORMOTEROL FUMARATE DIHYDRATE 160; 4.5 UG/1; UG/1
AEROSOL RESPIRATORY (INHALATION)
COMMUNITY
Start: 2023-12-07

## 2024-04-21 RX ORDER — ALBUTEROL SULFATE 90 UG/1
1-2 AEROSOL, METERED RESPIRATORY (INHALATION) EVERY 6 HOURS PRN
Qty: 6.7 G | Refills: 0 | Status: SHIPPED | OUTPATIENT
Start: 2024-04-21 | End: 2025-04-21

## 2024-04-21 RX ORDER — ONDANSETRON 4 MG/1
4 TABLET, ORALLY DISINTEGRATING ORAL
Status: COMPLETED | OUTPATIENT
Start: 2024-04-21 | End: 2024-04-21

## 2024-04-21 RX ORDER — ALBUTEROL SULFATE 90 UG/1
AEROSOL, METERED RESPIRATORY (INHALATION)
COMMUNITY
Start: 2023-04-18 | End: 2024-04-21

## 2024-04-21 RX ADMIN — ONDANSETRON 4 MG: 4 TABLET, ORALLY DISINTEGRATING ORAL at 02:04

## 2024-04-21 NOTE — ED PROVIDER NOTES
Encounter Date: 4/21/2024       History     Chief Complaint   Patient presents with    Cough     Cough x 1 week. Nauseated and dry heaves after coughing sometimes.      Patient complains of cough for several days        Review of patient's allergies indicates:  No Known Allergies  Past Medical History:   Diagnosis Date    Arthritis     Asthma     DVT (deep venous thrombosis)     left popliteal    GERD (gastroesophageal reflux disease)     Hyperlipidemia     Hypertension     Insomnia 6/13/2013    Knee pain     Obesity (BMI 35.0-39.9 without comorbidity) 2/20/2017     Past Surgical History:   Procedure Laterality Date    APPENDECTOMY      BLADDER REPAIR      HERNIA REPAIR      JOINT REPLACEMENT      KNEE SURGERY      right    ROBOT-ASSISTED REPAIR OF INCISIONAL HERNIA USING DA MIRIAN XI N/A 10/22/2018    Procedure: XI ROBOTIC REPAIR, HERNIA, INCISIONAL;  Surgeon: Skyler Goddard MD;  Location: [facility];  Service: General;  Laterality: N/A;    TUBAL LIGATION      TYMPANIC MEMBRANE REPAIR       Family History   Problem Relation Name Age of Onset    Heart disease Mother Belinda     Hypertension Mother Belinda     Diabetes Mother Belinda     No Known Problems Father          unknown who he was per pt    Breast cancer Neg Hx      Colon cancer Neg Hx      Ovarian cancer Neg Hx      Thrombosis Neg Hx       Social History     Tobacco Use    Smoking status: Never    Smokeless tobacco: Never   Substance Use Topics    Alcohol use: No    Drug use: No     Review of Systems   Constitutional:  Negative for fever.   HENT:  Negative for sore throat.    Respiratory:  Negative for shortness of breath.    Cardiovascular:  Negative for chest pain.   Gastrointestinal:  Negative for nausea.   Genitourinary:  Negative for dysuria.   Musculoskeletal:  Negative for back pain.   Skin:  Negative for rash.   Neurological:  Negative for weakness.   Hematological:  Does not bruise/bleed easily.       Physical Exam     Initial Vitals [04/21/24 1407]   BP  Pulse Resp Temp SpO2   124/73 86 18 98.2 °F (36.8 °C) 95 %      MAP       --         Physical Exam    Nursing note and vitals reviewed.  Constitutional: She appears well-developed and well-nourished. She is not diaphoretic. She is active.  Non-toxic appearance. No distress.   HENT:   Head: Normocephalic and atraumatic.   Eyes: Conjunctivae are normal. Right eye exhibits no discharge. Left eye exhibits no discharge. No scleral icterus.   Neck:   Normal range of motion.  Cardiovascular:  Normal rate, regular rhythm and intact distal pulses.           No murmur heard.  Pulmonary/Chest: No respiratory distress. She has wheezes. She has no rhonchi. She has no rales.   Abdominal: She exhibits no distension.   Musculoskeletal:         General: No tenderness. Normal range of motion.      Cervical back: Normal range of motion.     Neurological: She is alert and oriented to person, place, and time. No cranial nerve deficit. GCS score is 15. GCS eye subscore is 4. GCS verbal subscore is 5. GCS motor subscore is 6.   Skin: Skin is warm and dry. Capillary refill takes less than 2 seconds. No rash noted.   Psychiatric: She has a normal mood and affect. Her behavior is normal. Judgment and thought content normal.         ED Course   Procedures  Labs Reviewed   SARS-COV-2 RDRP GENE          Imaging Results              X-Ray Chest AP Portable (Final result)  Result time 04/21/24 14:39:54      Final result by Reddy German MD (04/21/24 14:39:54)                   Impression:      No acute findings.      Electronically signed by: Reddy German MD  Date:    04/21/2024  Time:    14:39               Narrative:    EXAMINATION:  XR CHEST AP PORTABLE    CLINICAL HISTORY:  Cough and congestion, cough;    COMPARISON:  12/15/2023 x-ray.    FINDINGS:  Heart size is normal. The lung fields are clear. No acute cardiopulmonary infiltrate.    Bilateral AC joint arthrosis.  Thoracic spondylosis.                                        Medications   ondansetron disintegrating tablet 4 mg (4 mg Oral Given 4/21/24 0145)     Medical Decision Making  Amount and/or Complexity of Data Reviewed  Labs: ordered.  Radiology: ordered.    Risk  Prescription drug management.                                      Clinical Impression:  Final diagnoses:  [J06.9] Viral URI with cough (Primary)  [R06.2] Wheezing on expiration          ED Disposition Condition    Discharge Stable          ED Prescriptions       Medication Sig Dispense Start Date End Date Auth. Provider    albuterol (PROVENTIL/VENTOLIN HFA) 90 mcg/actuation inhaler Inhale 1-2 puffs into the lungs every 6 (six) hours as needed for Wheezing. Rescue 6.7 g 4/21/2024 4/21/2025 Km Burns NP    promethazine-dextromethorphan (PROMETHAZINE-DM) 6.25-15 mg/5 mL Syrp Take 5 mLs by mouth every 6 (six) hours as needed. 120 mL 4/21/2024 5/1/2024 Km Burns NP          Follow-up Information       Follow up With Specialties Details Why Contact Info    Vahe Hawkins MD Pathology Schedule an appointment as soon as possible for a visit  As needed [address] [place] [address], [address]  [facility] [address] [phone]               Km Burns NP  04/21/24 3126

## 2024-05-12 ENCOUNTER — HOSPITAL ENCOUNTER (EMERGENCY)
Facility: HOSPITAL | Age: 69
Discharge: HOME | End: 2024-05-12
Attending: EMERGENCY MEDICINE
Payer: MEDICARE

## 2024-05-12 VITALS
OXYGEN SATURATION: 96 % | BODY MASS INDEX: 35.43 KG/M2 | WEIGHT: 196.88 LBS | RESPIRATION RATE: 23 BRPM | SYSTOLIC BLOOD PRESSURE: 110 MMHG | HEART RATE: 66 BPM | TEMPERATURE: 98 F | DIASTOLIC BLOOD PRESSURE: 53 MMHG

## 2024-05-12 DIAGNOSIS — R55 SYNCOPE: ICD-10-CM

## 2024-05-12 LAB
ALBUMIN SERPL BCP-MCNC: 3.1 G/DL (ref 3.5–5.2)
ALP SERPL-CCNC: 74 U/L (ref 55–135)
ALT SERPL W/O P-5'-P-CCNC: 25 U/L (ref 10–44)
ANION GAP SERPL CALC-SCNC: 10 MMOL/L (ref 8–16)
AST SERPL-CCNC: 25 U/L (ref 10–40)
BASOPHILS # BLD AUTO: 0.05 K/UL (ref 0–0.2)
BASOPHILS NFR BLD: 0.6 % (ref 0–1.9)
BILIRUB SERPL-MCNC: 0.3 MG/DL (ref 0.1–1)
BNP SERPL-MCNC: <10 PG/ML (ref 0–99)
BUN SERPL-MCNC: 17 MG/DL (ref 8–23)
CALCIUM SERPL-MCNC: 8.5 MG/DL (ref 8.7–10.5)
CHLORIDE SERPL-SCNC: 103 MMOL/L (ref 95–110)
CO2 SERPL-SCNC: 26 MMOL/L (ref 23–29)
CREAT SERPL-MCNC: 1.3 MG/DL (ref 0.5–1.4)
CTP QC/QA: YES
CTP QC/QA: YES
DIFFERENTIAL METHOD BLD: ABNORMAL
EOSINOPHIL # BLD AUTO: 0 K/UL (ref 0–0.5)
EOSINOPHIL NFR BLD: 0.4 % (ref 0–8)
ERYTHROCYTE [DISTWIDTH] IN BLOOD BY AUTOMATED COUNT: 12.9 % (ref 11.5–14.5)
EST. GFR  (NO RACE VARIABLE): 45.4 ML/MIN/1.73 M^2
ETHANOL SERPL-MCNC: <10 MG/DL
GLUCOSE SERPL-MCNC: 182 MG/DL (ref 70–110)
HCT VFR BLD AUTO: 38.1 % (ref 37–48.5)
HGB BLD-MCNC: 12.6 G/DL (ref 12–16)
IMM GRANULOCYTES # BLD AUTO: 0.03 K/UL (ref 0–0.04)
IMM GRANULOCYTES NFR BLD AUTO: 0.4 % (ref 0–0.5)
LYMPHOCYTES # BLD AUTO: 1.2 K/UL (ref 1–4.8)
LYMPHOCYTES NFR BLD: 14.2 % (ref 18–48)
MCH RBC QN AUTO: 31.3 PG (ref 27–31)
MCHC RBC AUTO-ENTMCNC: 33.1 G/DL (ref 32–36)
MCV RBC AUTO: 95 FL (ref 82–98)
MONOCYTES # BLD AUTO: 0.7 K/UL (ref 0.3–1)
MONOCYTES NFR BLD: 7.8 % (ref 4–15)
NEUTROPHILS # BLD AUTO: 6.4 K/UL (ref 1.8–7.7)
NEUTROPHILS NFR BLD: 76.6 % (ref 38–73)
NRBC BLD-RTO: 0 /100 WBC
PLATELET # BLD AUTO: 151 K/UL (ref 150–450)
PMV BLD AUTO: 11.6 FL (ref 9.2–12.9)
POC MOLECULAR INFLUENZA A AGN: NEGATIVE
POC MOLECULAR INFLUENZA B AGN: NEGATIVE
POTASSIUM SERPL-SCNC: 3.9 MMOL/L (ref 3.5–5.1)
PROT SERPL-MCNC: 6.6 G/DL (ref 6–8.4)
RBC # BLD AUTO: 4.03 M/UL (ref 4–5.4)
SARS-COV-2 RDRP RESP QL NAA+PROBE: NEGATIVE
SODIUM SERPL-SCNC: 139 MMOL/L (ref 136–145)
TROPONIN I SERPL DL<=0.01 NG/ML-MCNC: <0.006 NG/ML (ref 0–0.03)
TROPONIN I SERPL DL<=0.01 NG/ML-MCNC: <0.006 NG/ML (ref 0–0.03)
WBC # BLD AUTO: 8.33 K/UL (ref 3.9–12.7)

## 2024-05-12 PROCEDURE — 87502 INFLUENZA DNA AMP PROBE: CPT | Mod: ER

## 2024-05-12 PROCEDURE — 83880 ASSAY OF NATRIURETIC PEPTIDE: CPT | Mod: ER | Performed by: EMERGENCY MEDICINE

## 2024-05-12 PROCEDURE — 93010 ELECTROCARDIOGRAM REPORT: CPT | Mod: ,,, | Performed by: STUDENT IN AN ORGANIZED HEALTH CARE EDUCATION/TRAINING PROGRAM

## 2024-05-12 PROCEDURE — 84484 ASSAY OF TROPONIN QUANT: CPT | Mod: 91,ER | Performed by: EMERGENCY MEDICINE

## 2024-05-12 PROCEDURE — 99285 EMERGENCY DEPT VISIT HI MDM: CPT | Mod: 25,ER

## 2024-05-12 PROCEDURE — 80053 COMPREHEN METABOLIC PANEL: CPT | Mod: ER | Performed by: EMERGENCY MEDICINE

## 2024-05-12 PROCEDURE — 94761 N-INVAS EAR/PLS OXIMETRY MLT: CPT | Mod: ER

## 2024-05-12 PROCEDURE — 82077 ASSAY SPEC XCP UR&BREATH IA: CPT | Mod: ER | Performed by: EMERGENCY MEDICINE

## 2024-05-12 PROCEDURE — 25000003 PHARM REV CODE 250: Mod: ER | Performed by: EMERGENCY MEDICINE

## 2024-05-12 PROCEDURE — 96360 HYDRATION IV INFUSION INIT: CPT | Mod: ER

## 2024-05-12 PROCEDURE — 86803 HEPATITIS C AB TEST: CPT | Performed by: EMERGENCY MEDICINE

## 2024-05-12 PROCEDURE — 96361 HYDRATE IV INFUSION ADD-ON: CPT | Mod: ER

## 2024-05-12 PROCEDURE — 87635 SARS-COV-2 COVID-19 AMP PRB: CPT | Mod: ER | Performed by: EMERGENCY MEDICINE

## 2024-05-12 PROCEDURE — 93005 ELECTROCARDIOGRAM TRACING: CPT | Mod: ER

## 2024-05-12 PROCEDURE — 85025 COMPLETE CBC W/AUTO DIFF WBC: CPT | Mod: ER | Performed by: EMERGENCY MEDICINE

## 2024-05-12 PROCEDURE — 87389 HIV-1 AG W/HIV-1&-2 AB AG IA: CPT | Performed by: EMERGENCY MEDICINE

## 2024-05-12 RX ADMIN — SODIUM CHLORIDE 1000 ML: 9 INJECTION, SOLUTION INTRAVENOUS at 05:05

## 2024-05-12 RX ADMIN — SODIUM CHLORIDE 1000 ML: 9 INJECTION, SOLUTION INTRAVENOUS at 04:05

## 2024-05-12 NOTE — ED PROVIDER NOTES
Encounter Date: 5/12/2024       History     Chief Complaint   Patient presents with    Loss of Consciousness     Pt had a syncope episode PTA, EMS observed another syncope episode.      Pt had syncopal episode pta. Denies any injury.  C/o cough for past several days. Decreased po intake due to not feeling well.    The history is provided by the patient.   Illness   The current episode started just prior to arrival. The problem occurs rarely. The problem has been unchanged. The pain is at a severity of 0/10. Nothing relieves the symptoms. Nothing aggravates the symptoms. Associated symptoms include sore throat, cough and URI. Pertinent negatives include no fever, no decreased vision, no double vision, no eye itching, no photophobia, no abdominal pain, no constipation, no diarrhea, no nausea, no vomiting, no congestion, no ear discharge, no ear pain, no headaches, no hearing loss, no mouth sores, no rhinorrhea, no stridor, no swollen glands, no shortness of breath, no wheezing, no rash, no discharge, no pain and no eye redness. She has received no recent medical care.     Review of patient's allergies indicates:  No Known Allergies  Past Medical History:   Diagnosis Date    Arthritis     Asthma     DVT (deep venous thrombosis)     left popliteal    GERD (gastroesophageal reflux disease)     Hyperlipidemia     Hypertension     Insomnia 6/13/2013    Knee pain     Obesity (BMI 35.0-39.9 without comorbidity) 2/20/2017     Past Surgical History:   Procedure Laterality Date    APPENDECTOMY      BLADDER REPAIR      HERNIA REPAIR      JOINT REPLACEMENT      KNEE SURGERY      right    ROBOT-ASSISTED REPAIR OF INCISIONAL HERNIA USING DA MIRIAN XI N/A 10/22/2018    Procedure: XI ROBOTIC REPAIR, HERNIA, INCISIONAL;  Surgeon: Skyler Goddard MD;  Location: [facility];  Service: General;  Laterality: N/A;    TUBAL LIGATION      TYMPANIC MEMBRANE REPAIR       Family History   Problem Relation Name Age of Onset    Heart disease Mother  Belinda     Hypertension Mother Belinda     Diabetes Mother Belinda     No Known Problems Father          unknown who he was per pt    Breast cancer Neg Hx      Colon cancer Neg Hx      Ovarian cancer Neg Hx      Thrombosis Neg Hx       Social History     Tobacco Use    Smoking status: Never    Smokeless tobacco: Never   Substance Use Topics    Alcohol use: No    Drug use: No     Review of Systems   Constitutional:  Negative for fever.   HENT:  Positive for sore throat. Negative for congestion, ear discharge, ear pain, hearing loss, mouth sores and rhinorrhea.    Eyes:  Negative for double vision, photophobia, pain, discharge, redness and itching.   Respiratory:  Positive for cough. Negative for shortness of breath, wheezing and stridor.    Cardiovascular:  Negative for chest pain.   Gastrointestinal:  Negative for abdominal pain, constipation, diarrhea, nausea and vomiting.   Genitourinary:  Negative for dysuria.   Musculoskeletal:  Negative for back pain.   Skin:  Negative for rash.   Neurological:  Negative for weakness and headaches.   Hematological:  Does not bruise/bleed easily.   All other systems reviewed and are negative.      Physical Exam     Initial Vitals   BP Pulse Resp Temp SpO2   05/12/24 0321 05/12/24 0321 05/12/24 0321 05/12/24 0400 05/12/24 0321   (!) 102/53 74 19 97.9 °F (36.6 °C) 96 %      MAP       --                Physical Exam    Nursing note and vitals reviewed.  Constitutional: She appears well-developed and well-nourished.   HENT:   Head: Normocephalic and atraumatic.   Mouth/Throat: No oropharyngeal exudate.   Eyes: Conjunctivae and EOM are normal. Pupils are equal, round, and reactive to light.   Neck: Neck supple. No thyromegaly present.   Normal range of motion.  Cardiovascular:  Normal rate, regular rhythm, normal heart sounds and intact distal pulses.     Exam reveals no gallop and no friction rub.       No murmur heard.  Pulmonary/Chest: Breath sounds normal. No respiratory  distress. She has no wheezes. She has no rhonchi. She exhibits no tenderness.   Abdominal: Abdomen is soft. Bowel sounds are normal. She exhibits no distension. There is no abdominal tenderness. There is no rebound and no guarding.   Musculoskeletal:         General: No tenderness or edema. Normal range of motion.      Cervical back: Normal range of motion and neck supple.     Lymphadenopathy:     She has no cervical adenopathy.   Neurological: She is alert and oriented to person, place, and time. She has normal strength. No cranial nerve deficit or sensory deficit. GCS score is 15. GCS eye subscore is 4. GCS verbal subscore is 5. GCS motor subscore is 6.   Skin: Skin is warm and dry. Capillary refill takes less than 2 seconds. No rash noted.   Psychiatric: She has a normal mood and affect. Her behavior is normal. Judgment and thought content normal.         ED Course   Procedures  Labs Reviewed   CBC W/ AUTO DIFFERENTIAL - Abnormal; Notable for the following components:       Result Value    MCH 31.3 (*)     Gran % 76.6 (*)     Lymph % 14.2 (*)     All other components within normal limits    Narrative:     Release to patient->Immediate   COMPREHENSIVE METABOLIC PANEL - Abnormal; Notable for the following components:    Glucose 182 (*)     Calcium 8.5 (*)     Albumin 3.1 (*)     eGFR 45.4 (*)     All other components within normal limits    Narrative:     Release to patient->Immediate   TROPONIN I    Narrative:     Release to patient->Immediate   TROPONIN I   B-TYPE NATRIURETIC PEPTIDE    Narrative:     Release to patient->Immediate   ALCOHOL,MEDICAL (ETHANOL)    Narrative:     Release to patient->Immediate   HIV 1 / 2 ANTIBODY   HEPATITIS C ANTIBODY   HEP C VIRUS HOLD SPECIMEN   URINALYSIS, REFLEX TO URINE CULTURE   DRUG SCREEN PANEL, URINE EMERGENCY   SARS-COV-2 RDRP GENE   POCT INFLUENZA A/B MOLECULAR     EKG Readings: (Independently Interpreted)   Initial Reading: No STEMI. Rhythm: Normal Sinus Rhythm. Heart  Rate: 66. Ectopy: No Ectopy. Conduction: RBBB. ST Segments: Normal ST Segments. T Waves: Normal. Axis: Normal. Clinical Impression: Normal Sinus Rhythm with RBBB       Imaging Results              CT Head Without Contrast (Final result)  Result time 05/12/24 06:20:17      Final result by Venkatesh Rocha MD (05/12/24 06:20:17)                   Impression:      No acute intracranial CT abnormality.  Further evaluation as warranted.    All CT scans at this facility are performed  using dose modulation techniques as appropriate to performed exam including the following:  automated exposure control; adjustment of mA and/or kV according to the patients size (this includes techniques or standardized protocols for targeted exams where dose is matched to indication/reason for exam: i.e. extremities or head);  iterative reconstruction technique.      Electronically signed by: Venkatesh Rocha  Date:    05/12/2024  Time:    06:20               Narrative:    EXAMINATION:  CT HEAD WITHOUT CONTRAST    CLINICAL HISTORY:  Dizziness, persistent/recurrent, cardiac or vascular cause suspected;Syncope, recurrent; Syncope and collapse    TECHNIQUE:  Low dose axial CT images obtained throughout the head without intravenous contrast. Sagittal and coronal reconstructions were performed.    COMPARISON:  Multiple priors.    FINDINGS:  Intracranial compartment:    Ventricles and sulci are normal in size for age without evidence of hydrocephalus. No extra-axial blood or fluid collections.    Mild microvascular ischemic change.  Remote right basal ganglia lacunar infarct.  No parenchymal mass, hemorrhage, edema or major vascular distribution infarct.    Skull/extracranial contents (limited evaluation): No fracture. Mastoid air cells and paranasal sinuses are essentially clear.                                       X-Ray Chest AP Portable (Final result)  Result time 05/12/24 06:25:48      Final result by Venkatesh Rocha MD (05/12/24  06:25:48)                   Impression:      No acute abnormality.      Electronically signed by: Venkatesh Rocha  Date:    05/12/2024  Time:    06:25               Narrative:    EXAMINATION:  XR CHEST AP PORTABLE    CLINICAL HISTORY:  syncope;    TECHNIQUE:  Single frontal view of the chest was performed.    COMPARISON:  Multiple priors.    FINDINGS:  The lungs are clear, with normal appearance of pulmonary vasculature and no pleural effusion or pneumothorax.    The cardiac silhouette is enlarged.  The hilar and mediastinal contours are unremarkable.    Bones are intact.                                       Medications   sodium chloride 0.9% bolus 1,000 mL 1,000 mL (0 mLs Intravenous Stopped 5/12/24 0558)   sodium chloride 0.9% bolus 1,000 mL 1,000 mL (0 mLs Intravenous Stopped 5/12/24 0706)     Medical Decision Making  Amount and/or Complexity of Data Reviewed  Labs: ordered.  Radiology: ordered.               ED Course as of 05/12/24 0759   Sun May 12, 2024   0332 Rhythm strip reviewed. Nsr, no stemi. 66 bpm [LV]      ED Course User Index  [LV] Kannan Sanchez Jr., MD       Vitals:    05/12/24 0407 05/12/24 0431 05/12/24 0447 05/12/24 0502   BP: (!) 93/49 (!) 100/50 (!) 105/53 (!) 105/51   Pulse: 65 63 65 71   Resp:       Temp:       TempSrc:       SpO2: 96% 95% 97% 98%   Weight:        05/12/24 0516 05/12/24 0531 05/12/24 0547 05/12/24 0631   BP: (!) 101/49 (!) 100/51 (!) 100/51 (!) 104/51   Pulse: 66 65 64 63   Resp:  19 18    Temp:       TempSrc:       SpO2: 95% 96% 96% 97%   Weight:        05/12/24 0655 05/12/24 0658 05/12/24 0703 05/12/24 0713   BP: (!) 112/53 (!) 106/53 (!) 113/55    Pulse: 69 74 70    Resp:    18   Temp:       TempSrc:       SpO2: 99% 98% 98% 97%   Weight:        05/12/24 0717 05/12/24 0731 05/12/24 0736   BP: (!) 104/53 (!) 108/52    Pulse: 65 64 65   Resp:  18 20   Temp:      TempSrc:      SpO2: 96% 97% 96%   Weight:          Results for orders placed or performed during the hospital  encounter of 05/12/24   CBC auto differential   Result Value Ref Range    WBC 8.33 3.90 - 12.70 K/uL    RBC 4.03 4.00 - 5.40 M/uL    Hemoglobin 12.6 12.0 - 16.0 g/dL    Hematocrit 38.1 37.0 - 48.5 %    MCV 95 82 - 98 fL    MCH 31.3 (H) 27.0 - 31.0 pg    MCHC 33.1 32.0 - 36.0 g/dL    RDW 12.9 11.5 - 14.5 %    Platelets 151 150 - 450 K/uL    MPV 11.6 9.2 - 12.9 fL    Immature Granulocytes 0.4 0.0 - 0.5 %    Gran # (ANC) 6.4 1.8 - 7.7 K/uL    Immature Grans (Abs) 0.03 0.00 - 0.04 K/uL    Lymph # 1.2 1.0 - 4.8 K/uL    Mono # 0.7 0.3 - 1.0 K/uL    Eos # 0.0 0.0 - 0.5 K/uL    Baso # 0.05 0.00 - 0.20 K/uL    nRBC 0 0 /100 WBC    Gran % 76.6 (H) 38.0 - 73.0 %    Lymph % 14.2 (L) 18.0 - 48.0 %    Mono % 7.8 4.0 - 15.0 %    Eosinophil % 0.4 0.0 - 8.0 %    Basophil % 0.6 0.0 - 1.9 %    Differential Method Automated    Comprehensive metabolic panel   Result Value Ref Range    Sodium 139 136 - 145 mmol/L    Potassium 3.9 3.5 - 5.1 mmol/L    Chloride 103 95 - 110 mmol/L    CO2 26 23 - 29 mmol/L    Glucose 182 (H) 70 - 110 mg/dL    BUN 17 8 - 23 mg/dL    Creatinine 1.3 0.5 - 1.4 mg/dL    Calcium 8.5 (L) 8.7 - 10.5 mg/dL    Total Protein 6.6 6.0 - 8.4 g/dL    Albumin 3.1 (L) 3.5 - 5.2 g/dL    Total Bilirubin 0.3 0.1 - 1.0 mg/dL    Alkaline Phosphatase 74 55 - 135 U/L    AST 25 10 - 40 U/L    ALT 25 10 - 44 U/L    eGFR 45.4 (A) >60 mL/min/1.73 m^2    Anion Gap 10 8 - 16 mmol/L   Troponin I #1   Result Value Ref Range    Troponin I <0.006 0.000 - 0.026 ng/mL   Troponin I #2   Result Value Ref Range    Troponin I <0.006 0.000 - 0.026 ng/mL   BNP   Result Value Ref Range    BNP <10 0 - 99 pg/mL   Ethanol   Result Value Ref Range    Alcohol, Serum <10 <10 mg/dL   POCT COVID-19 Rapid Screening   Result Value Ref Range    POC Rapid COVID Negative Negative     Acceptable Yes    POCT Influenza A/B Molecular   Result Value Ref Range    POC Molecular Influenza A Ag Negative Negative    POC Molecular Influenza B Ag Negative  Negative     Acceptable Yes          Imaging Results              CT Head Without Contrast (Final result)  Result time 05/12/24 06:20:17      Final result by Venkatesh Rocha MD (05/12/24 06:20:17)                   Impression:      No acute intracranial CT abnormality.  Further evaluation as warranted.    All CT scans at this facility are performed  using dose modulation techniques as appropriate to performed exam including the following:  automated exposure control; adjustment of mA and/or kV according to the patients size (this includes techniques or standardized protocols for targeted exams where dose is matched to indication/reason for exam: i.e. extremities or head);  iterative reconstruction technique.      Electronically signed by: Venkatseh Rocha  Date:    05/12/2024  Time:    06:20               Narrative:    EXAMINATION:  CT HEAD WITHOUT CONTRAST    CLINICAL HISTORY:  Dizziness, persistent/recurrent, cardiac or vascular cause suspected;Syncope, recurrent; Syncope and collapse    TECHNIQUE:  Low dose axial CT images obtained throughout the head without intravenous contrast. Sagittal and coronal reconstructions were performed.    COMPARISON:  Multiple priors.    FINDINGS:  Intracranial compartment:    Ventricles and sulci are normal in size for age without evidence of hydrocephalus. No extra-axial blood or fluid collections.    Mild microvascular ischemic change.  Remote right basal ganglia lacunar infarct.  No parenchymal mass, hemorrhage, edema or major vascular distribution infarct.    Skull/extracranial contents (limited evaluation): No fracture. Mastoid air cells and paranasal sinuses are essentially clear.                                       X-Ray Chest AP Portable (Final result)  Result time 05/12/24 06:25:48      Final result by Venkatesh Rocha MD (05/12/24 06:25:48)                   Impression:      No acute abnormality.      Electronically signed by: Venkatesh  Josefina  Date:    05/12/2024  Time:    06:25               Narrative:    EXAMINATION:  XR CHEST AP PORTABLE    CLINICAL HISTORY:  syncope;    TECHNIQUE:  Single frontal view of the chest was performed.    COMPARISON:  Multiple priors.    FINDINGS:  The lungs are clear, with normal appearance of pulmonary vasculature and no pleural effusion or pneumothorax.    The cardiac silhouette is enlarged.  The hilar and mediastinal contours are unremarkable.    Bones are intact.                                      Medications   sodium chloride 0.9% bolus 1,000 mL 1,000 mL (0 mLs Intravenous Stopped 5/12/24 0558)   sodium chloride 0.9% bolus 1,000 mL 1,000 mL (0 mLs Intravenous Stopped 5/12/24 0706)       6:22 AM I assumed care at 6:00 a.m., case discussed in detail with Dr. Sanchez, patient interviewed and examined, data reviewed.  Syncopal episode of uncertain origin, possibly related to variations in blood pressure, may have had some decrease in oral intake.  Has responded well to IV fluids, basic workup so far relatively unremarkable.  Await further data. UA, 2nd troponin, CT pending.  Resting comfortably, vital signs stable, no new complaints.  Feels better. IV fluids infusing.    Rahul Sung MD      7:55 AM She remains clinically stable.  Repeat data and CT unremarkable.  She does describe some ongoing episodes suggestive of orthostatic hypotension at home, she tends to pause when changing positions and tries not to stand at too fast.  No high-risk elements in this clinical scenario, judged appropriate for outpatient management, suggest that she talk things over with her primary care about possible further evaluation of syncope and possible evaluation for orthostatic syncope.  Orthostatic vital signs here are negative for significant change but she did experience some symptoms with position change.  Will recommend that she explore this further as an outpatient. D/C instructions:      Call your doctor soon for a  repeat appointment for episodes of dizziness with standing/ passing out.      Exam and testing here is fine.    I suspect that you may need your medications adjusted for changes when you go from sitting to standing or lying to standing.      Rahul Sung MD           Medication List        ASK your doctor about these medications      albuterol 90 mcg/actuation inhaler  Commonly known as: PROVENTIL/VENTOLIN HFA  Inhale 1-2 puffs into the lungs every 6 (six) hours as needed for Wheezing. Rescue     ALBUTEROL INHL     benazepriL 10 MG tablet  Commonly known as: LOTENSIN     budesonide-formoterol 160-4.5 mcg 160-4.5 mcg/actuation Hfaa  Commonly known as: SYMBICORT     furosemide 20 MG tablet  Commonly known as: LASIX  Take 1 tablet (20 mg total) by mouth once daily.     hydroCHLOROthiazide 25 MG tablet  Commonly known as: HYDRODIURIL     HYDROcodone-acetaminophen 7.5-325 mg per tablet  Commonly known as: NORCO     LEXAPRO 10 mg tablet  Generic drug: EScitalopram oxalate     metoclopramide HCl 10 MG tablet  Commonly known as: REGLAN  Take 1 tablet (10 mg total) by mouth every 6 (six) hours.     montelukast 10 mg tablet  Commonly known as: SINGULAIR     pantoprazole 40 MG tablet  Commonly known as: PROTONIX     potassium chloride 10 MEQ Tbsr  Commonly known as: KLOR-CON  Take 1 tablet (10 mEq total) by mouth once daily.     zolpidem 5 MG Tab  Commonly known as: AMBIEN  Take 1 tablet (5 mg total) by mouth nightly as needed.             Current Discharge Medication List            ED Diagnosis  1. Syncope                            Clinical Impression:  Final diagnoses:  [R55] Syncope          ED Disposition Condition    Discharge Stable          ED Prescriptions    None       Follow-up Information       Follow up With Specialties Details Why Contact Info    [facility] - Emergency Dept Emergency Medicine  As needed [address]  [facility] [id number] [phone]             Rahul Sung MD  05/12/24  [id number]

## 2024-05-12 NOTE — DISCHARGE INSTRUCTIONS
Call your doctor soon for a repeat appointment for episodes of dizziness with standing/ passing out.      Exam and testing here is fine.    I suspect that you may need your medications adjusted for changes when you go from sitting to standing or lying to standing.

## 2024-05-13 LAB
HCV AB SERPL QL IA: NEGATIVE
HEP C VIRUS HOLD SPECIMEN: NORMAL
HIV 1+2 AB+HIV1 P24 AG SERPL QL IA: NEGATIVE
OHS QRS DURATION: 150 MS
OHS QTC CALCULATION: 471 MS

## (undated) DEVICE — CONTAINER SPECIMEN STRL 4OZ

## (undated) DEVICE — ADHESIVE MASTISOL VIAL 48/BX

## (undated) DEVICE — CLOSURE SKIN STERI STRIP 1/2X4

## (undated) DEVICE — MANIFOLD 4 PORT

## (undated) DEVICE — ELECTRODE REM PLYHSV RETURN 9

## (undated) DEVICE — DRAPE ABDOMINAL TIBURON 14X11

## (undated) DEVICE — NDL SAFETY 22G X 1.5 ECLIPSE

## (undated) DEVICE — SOL 9P NACL IRR PIC IL

## (undated) DEVICE — SUT VICRYL 0 SH

## (undated) DEVICE — DECANTER VIAL ASEPTIC TRANSFER

## (undated) DEVICE — CLIPPER BLADE MOD 4406 (CAREF)

## (undated) DEVICE — SUT VICRYL 3-0 27 SH

## (undated) DEVICE — APPLICATOR CHLORAPREP ORN 26ML

## (undated) DEVICE — GAUZE SPONGE PEANUT STRL

## (undated) DEVICE — SYR 30CC LUER LOCK

## (undated) DEVICE — DRAPE LAP TIBURON 77X122IN

## (undated) DEVICE — SEE MEDLINE ITEM 157117

## (undated) DEVICE — EVACUATOR WOUND BULB 100CC

## (undated) DEVICE — COVER OVERHEAD SURG LT BLUE

## (undated) DEVICE — POSITIONER HEAD DONUT 9IN FOAM

## (undated) DEVICE — SEE MEDLINE ITEM 157027

## (undated) DEVICE — DRESSING TRANS 4X4 TEGADERM

## (undated) DEVICE — Device

## (undated) DEVICE — GLOVE 7.0 PROTEXIS PI BLUE

## (undated) DEVICE — SUT VICRYL PLUS 4-0 P3 18IN

## (undated) DEVICE — SUPPORT ULNA NERVE PROTECTOR

## (undated) DEVICE — SEE MEDLINE ITEM 152622

## (undated) DEVICE — GAUZE SPONGE 4X4 12PLY

## (undated) DEVICE — GLOVE PROTEXIS HYDROGEL SZ7

## (undated) DEVICE — SUT ETHIBOND 0 CR/MO-7 8-18